# Patient Record
Sex: FEMALE | Race: WHITE
[De-identification: names, ages, dates, MRNs, and addresses within clinical notes are randomized per-mention and may not be internally consistent; named-entity substitution may affect disease eponyms.]

---

## 2020-09-18 ENCOUNTER — HOSPITAL ENCOUNTER (OUTPATIENT)
Dept: HOSPITAL 47 - RADMAMWWP | Age: 75
Discharge: HOME | End: 2020-09-18
Attending: FAMILY MEDICINE
Payer: MEDICARE

## 2020-09-18 DIAGNOSIS — R06.2: ICD-10-CM

## 2020-09-18 DIAGNOSIS — Z12.31: Primary | ICD-10-CM

## 2020-09-18 PROCEDURE — 77063 BREAST TOMOSYNTHESIS BI: CPT

## 2020-09-18 PROCEDURE — 77067 SCR MAMMO BI INCL CAD: CPT

## 2020-09-18 PROCEDURE — 71046 X-RAY EXAM CHEST 2 VIEWS: CPT

## 2020-09-18 NOTE — XR
EXAMINATION TYPE: XR chest 2V

 

DATE OF EXAM: 9/18/2020

 

COMPARISON: None

 

INDICATION: Wheezing

 

TECHNIQUE:  Frontal and lateral views of the chest are obtained.

 

FINDINGS:  

The heart size is normal.  

The pulmonary vasculature is normal.

The lungs are clear.  There is elevation of the right diaphragm

 

IMPRESSION:  

1. No acute pulmonary process.

## 2020-10-07 ENCOUNTER — HOSPITAL ENCOUNTER (OUTPATIENT)
Dept: HOSPITAL 47 - CPPFTMAIN | Age: 75
Discharge: HOME | End: 2020-10-07
Attending: FAMILY MEDICINE
Payer: MEDICARE

## 2020-10-07 DIAGNOSIS — N18.30: ICD-10-CM

## 2020-10-07 DIAGNOSIS — R06.2: Primary | ICD-10-CM

## 2020-10-07 DIAGNOSIS — E78.5: ICD-10-CM

## 2020-10-07 DIAGNOSIS — E03.9: ICD-10-CM

## 2020-10-07 LAB
BASOPHILS # BLD AUTO: 0 K/UL (ref 0–0.2)
BASOPHILS NFR BLD AUTO: 1 %
CHOLEST SERPL-MCNC: 174 MG/DL (ref ?–200)
EOSINOPHIL # BLD AUTO: 0.2 K/UL (ref 0–0.7)
EOSINOPHIL NFR BLD AUTO: 4 %
ERYTHROCYTE [DISTWIDTH] IN BLOOD BY AUTOMATED COUNT: 4.26 M/UL (ref 3.8–5.4)
ERYTHROCYTE [DISTWIDTH] IN BLOOD: 14.6 % (ref 11.5–15.5)
HCT VFR BLD AUTO: 35.7 % (ref 34–46)
HDLC SERPL-MCNC: 21 MG/DL (ref 40–60)
HGB BLD-MCNC: 11.8 GM/DL (ref 11.4–16)
LYMPHOCYTES # SPEC AUTO: 1.7 K/UL (ref 1–4.8)
LYMPHOCYTES NFR SPEC AUTO: 33 %
MCH RBC QN AUTO: 27.6 PG (ref 25–35)
MCHC RBC AUTO-ENTMCNC: 32.9 G/DL (ref 31–37)
MCV RBC AUTO: 83.9 FL (ref 80–100)
MONOCYTES # BLD AUTO: 0.5 K/UL (ref 0–1)
MONOCYTES NFR BLD AUTO: 9 %
NEUTROPHILS # BLD AUTO: 2.6 K/UL (ref 1.3–7.7)
NEUTROPHILS NFR BLD AUTO: 51 %
PLATELET # BLD AUTO: 294 K/UL (ref 150–450)
T4 FREE SERPL-MCNC: 1.33 NG/DL (ref 0.78–2.19)
TRIGL SERPL-MCNC: 630 MG/DL (ref ?–150)
WBC # BLD AUTO: 5.1 K/UL (ref 3.8–10.6)

## 2020-10-07 PROCEDURE — 85025 COMPLETE CBC W/AUTO DIFF WBC: CPT

## 2020-10-07 PROCEDURE — 80061 LIPID PANEL: CPT

## 2020-10-07 PROCEDURE — 84443 ASSAY THYROID STIM HORMONE: CPT

## 2020-10-07 PROCEDURE — 94060 EVALUATION OF WHEEZING: CPT

## 2020-10-07 PROCEDURE — 36415 COLL VENOUS BLD VENIPUNCTURE: CPT

## 2020-10-07 PROCEDURE — 84439 ASSAY OF FREE THYROXINE: CPT

## 2020-10-07 PROCEDURE — 94729 DIFFUSING CAPACITY: CPT

## 2020-10-07 PROCEDURE — 94726 PLETHYSMOGRAPHY LUNG VOLUMES: CPT

## 2020-10-07 NOTE — MM
Reason for exam: screening  (asymptomatic).

Last mammogram was performed 1 year and 1 month ago.



Physical Findings:

A clinical breast exam by your physician is recommended on an annual basis and 

results should be correlated with mammographic findings.



MG 3D Screening Mammo W/Cad

Bilateral CC and MLO view(s) were taken.

Prior study comparison: August 5, 2019, mammogram, performed at [place].

The breast tissue is heterogeneously dense. This may lower the sensitivity of 

mammography.  Stable benign calcifications. There is no discrete abnormality.  No 

significant changes when compared with prior studies.





ASSESSMENT: Benign, BI-RAD 2



RECOMMENDATION:

Routine screening mammogram of both breasts in 1 year.

## 2020-11-11 ENCOUNTER — HOSPITAL ENCOUNTER (OUTPATIENT)
Dept: HOSPITAL 47 - EC | Age: 75
Setting detail: OBSERVATION
LOS: 1 days | Discharge: HOME | End: 2020-11-12
Attending: INTERNAL MEDICINE | Admitting: INTERNAL MEDICINE
Payer: MEDICARE

## 2020-11-11 DIAGNOSIS — Z79.4: ICD-10-CM

## 2020-11-11 DIAGNOSIS — Z79.899: ICD-10-CM

## 2020-11-11 DIAGNOSIS — Z90.49: ICD-10-CM

## 2020-11-11 DIAGNOSIS — Z90.710: ICD-10-CM

## 2020-11-11 DIAGNOSIS — R42: Primary | ICD-10-CM

## 2020-11-11 DIAGNOSIS — N18.9: ICD-10-CM

## 2020-11-11 DIAGNOSIS — Z96.29: ICD-10-CM

## 2020-11-11 DIAGNOSIS — E11.22: ICD-10-CM

## 2020-11-11 DIAGNOSIS — R74.8: ICD-10-CM

## 2020-11-11 DIAGNOSIS — I12.9: ICD-10-CM

## 2020-11-11 DIAGNOSIS — Z88.8: ICD-10-CM

## 2020-11-11 DIAGNOSIS — Z94.4: ICD-10-CM

## 2020-11-11 DIAGNOSIS — Z79.02: ICD-10-CM

## 2020-11-11 DIAGNOSIS — Z91.041: ICD-10-CM

## 2020-11-11 DIAGNOSIS — Z86.73: ICD-10-CM

## 2020-11-11 DIAGNOSIS — Z79.890: ICD-10-CM

## 2020-11-11 DIAGNOSIS — R25.2: ICD-10-CM

## 2020-11-11 LAB
ALBUMIN SERPL-MCNC: 4.4 G/DL (ref 3.5–5)
ALP SERPL-CCNC: 46 U/L (ref 38–126)
ALT SERPL-CCNC: 77 U/L (ref 4–34)
ANION GAP SERPL CALC-SCNC: 7 MMOL/L
APTT BLD: 23 SEC (ref 22–30)
AST SERPL-CCNC: 70 U/L (ref 14–36)
BASOPHILS # BLD AUTO: 0 K/UL (ref 0–0.2)
BASOPHILS NFR BLD AUTO: 0 %
BUN SERPL-SCNC: 27 MG/DL (ref 7–17)
CALCIUM SPEC-MCNC: 9.7 MG/DL (ref 8.4–10.2)
CHLORIDE SERPL-SCNC: 102 MMOL/L (ref 98–107)
CO2 SERPL-SCNC: 25 MMOL/L (ref 22–30)
EOSINOPHIL # BLD AUTO: 0.1 K/UL (ref 0–0.7)
EOSINOPHIL NFR BLD AUTO: 3 %
ERYTHROCYTE [DISTWIDTH] IN BLOOD BY AUTOMATED COUNT: 4.13 M/UL (ref 3.8–5.4)
ERYTHROCYTE [DISTWIDTH] IN BLOOD: 14.4 % (ref 11.5–15.5)
GLUCOSE SERPL-MCNC: 169 MG/DL (ref 74–99)
HCT VFR BLD AUTO: 33.9 % (ref 34–46)
HGB BLD-MCNC: 11.6 GM/DL (ref 11.4–16)
INR PPP: 1 (ref ?–1.2)
LYMPHOCYTES # SPEC AUTO: 1.3 K/UL (ref 1–4.8)
LYMPHOCYTES NFR SPEC AUTO: 28 %
MCH RBC QN AUTO: 28 PG (ref 25–35)
MCHC RBC AUTO-ENTMCNC: 34.2 G/DL (ref 31–37)
MCV RBC AUTO: 82 FL (ref 80–100)
MONOCYTES # BLD AUTO: 0.4 K/UL (ref 0–1)
MONOCYTES NFR BLD AUTO: 8 %
NEUTROPHILS # BLD AUTO: 2.7 K/UL (ref 1.3–7.7)
NEUTROPHILS NFR BLD AUTO: 59 %
PLATELET # BLD AUTO: 288 K/UL (ref 150–450)
POTASSIUM SERPL-SCNC: 5 MMOL/L (ref 3.5–5.1)
PROT SERPL-MCNC: 7.7 G/DL (ref 6.3–8.2)
PT BLD: 10.6 SEC (ref 9–12)
SODIUM SERPL-SCNC: 134 MMOL/L (ref 137–145)
WBC # BLD AUTO: 4.6 K/UL (ref 3.8–10.6)

## 2020-11-11 PROCEDURE — 96374 THER/PROPH/DIAG INJ IV PUSH: CPT

## 2020-11-11 PROCEDURE — 84484 ASSAY OF TROPONIN QUANT: CPT

## 2020-11-11 PROCEDURE — 85025 COMPLETE CBC W/AUTO DIFF WBC: CPT

## 2020-11-11 PROCEDURE — 36415 COLL VENOUS BLD VENIPUNCTURE: CPT

## 2020-11-11 PROCEDURE — 99285 EMERGENCY DEPT VISIT HI MDM: CPT

## 2020-11-11 PROCEDURE — 85610 PROTHROMBIN TIME: CPT

## 2020-11-11 PROCEDURE — 80053 COMPREHEN METABOLIC PANEL: CPT

## 2020-11-11 PROCEDURE — 70450 CT HEAD/BRAIN W/O DYE: CPT

## 2020-11-11 PROCEDURE — 93005 ELECTROCARDIOGRAM TRACING: CPT

## 2020-11-11 PROCEDURE — 85730 THROMBOPLASTIN TIME PARTIAL: CPT

## 2020-11-11 PROCEDURE — 71046 X-RAY EXAM CHEST 2 VIEWS: CPT

## 2020-11-11 RX ADMIN — CEFAZOLIN SCH: 330 INJECTION, POWDER, FOR SOLUTION INTRAMUSCULAR; INTRAVENOUS at 19:19

## 2020-11-11 RX ADMIN — MYCOPHENOLIC ACID SCH MG: 180 TABLET, DELAYED RELEASE ORAL at 20:17

## 2020-11-11 RX ADMIN — METOPROLOL TARTRATE SCH MG: 50 TABLET, FILM COATED ORAL at 20:17

## 2020-11-11 NOTE — P.CNNES
History of Present Illness


Consult date: 11/11/20


Requesting physician: Ralph Vigil


Reason for Consult: Vertigo, ataxia, evaluate for TIA


History of Present Illness: 





Patient is a 75-year-old female came to the hospital today at 10 AM for 

dizziness.  Patient is recently started on prevagen on Sunday, 3 days ago for 

memory.  Patient took the dose on Sunday and Monday, and yesterday on Tuesday 

she woke up and was sweaty.  She felt dizzy like vertigo.  The symptoms improved

throughout the day yesterday but when she woke up this morning, was still 

present.  She describes as a spinning sensation.  Symptoms worsened with upright

position and head movement.  She also felt nauseous, and kept on walking to the 

left and was unsteady on the feet.  Patient is also concerned that she is 

falling towards the left side.  No focal weakness, no speech difficulties or 

visual disturbance.  Patient denies any recent upper respiratory infection, cold

or head injury.





Vital signs on arrival was blood pressure 143/65, pulse rate 67, temperature 

98.2.  CT head showed age-related atrophic and chronic small vessel ischemic 

changes without acute intracranial process.  On my review, the paranasal sinuses

and external auditory canal are clear.  Chest x-ray showed chronic changes wi

thout acute cardiopulmonary process.  EKG shows normal sinus rhythm, nonspecific

ST and T-wave abnormality.





Patient states that for last 1 year she was having a pressure sensation behind 

her left eardrum.  She would feel crackles all the time.  She went to see ENT 

specialist Dr. Cunningham, who performed hearing test and it was a "flatline" on 

the left, whereas she states was normal on the right.  Tympanostomy tube was 

placed in his office 2 weeks ago.  Patient denies any tinnitus.  Denies any h

earing loss.  Patient denies any use of tobacco, no hypertension.  





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 

head on the corner of the table.  When EMTs was called, she was confused and not

able to answer simple questions.  Patient does take Plavix 75 mg daily.  Patient

also has history of liver transplant due to Colon on 10/23/2011.  She has 

diabetes for last 24 years.





Review of Systems





As above in detail.  All other review of systems unremarkable.  Patient denies 

headache, problem with the vision, hoarseness, sore throat or dysphagia.  He 

denies any chest pain shortness of breath, wheezing or cough.  Denies abdominal 

pain nausea vomiting and diarrhea.  Denies neck or back pain.  All other review 

of systems unremarkable except as above.





Past Medical History


Past Medical History: CVA/TIA, Hypertension


History of Any Multi-Drug Resistant Organisms: None Reported


Past Surgical History: Appendectomy, Cholecystectomy, Hysterectomy


Additional Past Surgical History / Comment(s): D&C, liver transplant


Past Psychological History: No Psychological Hx Reported


Smoking Status: Never smoker


Past Alcohol Use History: None Reported


Past Drug Use History: None Reported





Medications and Allergies


                                Home Medications











 Medication  Instructions  Recorded  Confirmed  Type


 


Acetaminophen Tab [Tylenol Tab] 1,000 mg PO Q6HR PRN 11/11/20 11/11/20 History


 


Atorvastatin [Lipitor] 40 mg PO HS 11/11/20 11/11/20 History


 


Calcium Carbonate/Vitamin D3 1 tab PO DAILY 11/11/20 11/11/20 History





[Calcium 500-Vit D3 200 Tablet]    


 


Cephalexin [Keflex] 250 mg PO DAILY 11/11/20 11/11/20 History


 


Cholecalciferol [Vitamin D3] 400 unit PO DAILY 11/11/20 11/11/20 History


 


Cinnamon Bark [Cinnamon] 2,000 mg PO DAILY 11/11/20 11/11/20 History


 


Clopidogrel [Plavix] 75 mg PO DAILY 11/11/20 11/11/20 History


 


Cranberry Fruit Extract [Cranberry] 500 mg PO DAILY 11/11/20 11/11/20 History


 


Fenofibrate 54 mg PO DAILY 11/11/20 11/11/20 History


 


Furosemide [Lasix] 20 mg PO DAILY PRN 11/11/20 11/11/20 History


 


Garlique 1 tab PO DAILY 11/11/20 11/11/20 History


 


Glucosamine/Chondr Coffey A Sod [Osteo 2 tab PO DAILY 11/11/20 11/11/20 History





Bi-Flex Caplet]    


 


Insulin Glargine [Lantus] 40 unit SQ DAILY 11/11/20 11/11/20 History


 


Insulin Regular, Human [NovoLIN R] See Protocol SQ AC-TID 11/11/20 11/11/20 

History


 


Leg Cramps Sl 2 - 3 tab SL DAILY PRN 11/11/20 11/11/20 History


 


Levothyroxine Sodium [Synthroid] 50 mcg PO DAILY 11/11/20 11/11/20 History


 


Lisinopril [Prinivil] 10 mg PO DAILY 11/11/20 11/11/20 History


 


Magnesium Oxide 400 mg PO DAILY 11/11/20 11/11/20 History


 


Rosendo Red 1 tab PO DAILY 11/11/20 11/11/20 History


 


Metoprolol Tartrate [Lopressor] 50 mg PO BID 11/11/20 11/11/20 History


 


Multivitamins, Thera [Multivitamin 1 tab PO DAILY 11/11/20 11/11/20 History





(formulary)]    


 


Mycophenolate Sodium [Mycophenolic 360 mg PO BID 11/11/20 11/11/20 History





Acid]    


 


Omega-3 Fatty Acids [Omega-3] 1,000 mg PO DAILY 11/11/20 11/11/20 History


 


Pantoprazole Sodium [Protonix] 40 mg PO DAILY 11/11/20 11/11/20 History


 


Vit C/E/Zn/Coppr/Lutein/Zeaxan 1 cap PO DAILY 11/11/20 11/11/20 History





[Preservision Areds 2 Softgel]    


 


Vitamin E 1,000 unit PO DAILY 11/11/20 11/11/20 History


 


Zinc Gluconate [Zinc] 50 mg PO DAILY 11/11/20 11/11/20 History


 


cycloSPORINE [cycloSPORINE (GEQ 25 mg PO DAILY 11/11/20 11/11/20 History





for SandIMMUNE)]    


 


cycloSPORINE [cycloSPORINE (GEQ 50 mg PO HS 11/11/20 11/11/20 History





for SandIMMUNE)]    


 


sitaGLIPtin [Januvia] 100 mg PO DAILY 11/11/20 11/11/20 History








                                    Allergies











Allergy/AdvReac Type Severity Reaction Status Date / Time


 


Iodinated Contrast Media Allergy  Rash/Hives Verified 11/11/20 11:30


 


pentazocine [From Talwin] Allergy  Vomiting Verified 11/11/20 11:47














Physical Examination





- Vital Signs


Vital Signs: 


                                   Vital Signs











  Temp Pulse Resp BP Pulse Ox


 


 11/11/20 12:54   61  16  120/65  98


 


 11/11/20 10:03  98.2 F  67  18  143/65  99








                                Intake and Output











 11/10/20 11/11/20 11/11/20





 22:59 06:59 14:59


 


Other:   


 


  Weight   65.771 kg














On examination patient is an elderly  female, in no acute distress.  

Patient is alert and awake fully oriented.  Speech and language functions are 

normal.  Attention and concentration fund of knowledge is adequate.  On cranial 

examination pupils are round and reactive to light, visual fields are full on 

confrontation, extraocular muscles are intact with no nystagmus.  Face is 

symmetric, tongue protrudes to the midline.  Palatal elevation and sensation 

normal hearing appears normal for conversation.  Shoulder shrug normal, facial 

sensations normal.  Otologic examination revealed somewhat retracted left 

eardrum.  Evidence of recent placement of tympanostomy tube on the left side.  

The right eardrum appears normal.  On muscle strength testing there is no 

pronator drift and the strength is normal in arms and legs distally and 

proximally.  Reflexes are 1+ to 2, and plantars downgoing.  Sensory touch is 

equal.  No ataxia for finger-to-nose or heel-to-shin testing.  Tone and bulk of 

muscles normal.  Gait appears normal.  No carotid bruit or murmur, peripheral 

pulses present.





Results





- Laboratory Findings


CBC and BMP: 


                                 11/11/20 10:40





                                 11/11/20 10:40


Abnormal Lab Findings: 


                                  Abnormal Labs











  11/11/20 11/11/20





  10:40 10:40


 


Hct  33.9 L 


 


Sodium   134 L


 


BUN   27 H


 


Creatinine   1.25 H


 


Glucose   169 H


 


AST   70 H


 


ALT   77 H














Assessment and Plan


Assessment: 





* New onset vertigo since yesterday morning, likely due to peripheral vestibular

  dysfunction.  Patient had a one-year history of pressure and fluid behind the 

  left eardrum, for which she underwent placement of tympanostomy tube 2 weeks 

  ago.


* History of liver transplant for Colon.


* Diabetes


Plan: 





* Suggest patient follow-up with ENT specialist Dr. Ferguson regarding this 

  episode of persistent vertigo.


* Antivert as needed.


* Suggest Medrol Dosepak if no medical contra indications.


* Neurologically clear.

## 2020-11-11 NOTE — XR
EXAMINATION TYPE: XR chest 2V

 

DATE OF EXAM: 11/11/2020

 

COMPARISON: 9/18/2020

 

HISTORY: 75 year-old female altered mental status, vertigo and syncope.

 

TECHNIQUE:  PA and lateral views

 

FINDINGS:  

Heart normal size. Aorta and pulmonary vasculature within normal limits. Asymmetric elevation right h
emidiaphragm. No consolidation or pleural effusion. Mild interstitial prominence is unchanged.

 

 

IMPRESSION:  

Chronic changes without acute cardiopulmonary process.

## 2020-11-11 NOTE — ED
General Adult HPI





- General


Chief complaint: Neuro Symptoms/Deficit


Stated complaint: neuro issues


Time Seen by Provider: 11/11/20 10:04


Source: patient, family, RN notes reviewed


Mode of arrival: wheelchair


Limitations: no limitations





- History of Present Illness


Initial comments: 





Patient is a pleasant 75-year-old female presenting to the emergency department 

with dizziness.  Patient is on prevagen recently for memory.  Patient started 

that on Sunday.  Patient woke up Tuesday morning and was sweaty.  Patient also 

felt dizzy like vertigo.  Patient states symptoms improved throughout the day 

yesterday and then are still present this morning.  Patient has dizziness 

described as a spinning sensation.  Symptoms worsen with upright position and 

head movement.  Patient does have history of similar symptoms years ago 

associated with stroke.  Patient is also concerned that she is falling towards 

the left side.  No extremity weakness.  No speech problems.  No confusion.





- Related Data


                                    Allergies











Allergy/AdvReac Type Severity Reaction Status Date / Time


 


Iodinated Contrast Media Allergy  Rash/Hives Verified 11/11/20 10:11














Review of Systems


ROS Statement: 


Those systems with pertinent positive or pertinent negative responses have been 

documented in the HPI.





ROS Other: All systems not noted in ROS Statement are negative.


Constitutional: Denies: fever


Eyes: Denies: eye pain


ENT: Denies: ear pain


Respiratory: Denies: cough


Cardiovascular: Denies: chest pain


Endocrine: Denies: fatigue


Gastrointestinal: Denies: abdominal pain


Genitourinary: Denies: dysuria


Musculoskeletal: Denies: back pain


Skin: Denies: rash


Neurological: Reports: as per HPI, vertigo.  Denies: headache, weakness, 

confusion





Past Medical History


Past Medical History: CVA/TIA, Hypertension


History of Any Multi-Drug Resistant Organisms: None Reported


Past Surgical History: Appendectomy, Cholecystectomy, Hysterectomy


Additional Past Surgical History / Comment(s): D&C, liver transplant


Past Psychological History: No Psychological Hx Reported


Smoking Status: Never smoker


Past Alcohol Use History: None Reported


Past Drug Use History: None Reported





General Exam


Limitations: no limitations


General appearance: alert, in no apparent distress


Head exam: Present: normocephalic


Eye exam: Present: normal appearance, PERRL, EOMI.  Absent: nystagmus


ENT exam: Present: normal oropharynx


Neck exam: Present: normal inspection


Respiratory exam: Present: normal lung sounds bilaterally


Cardiovascular Exam: Present: regular rate, normal rhythm


GI/Abdominal exam: Present: soft.  Absent: tenderness


Extremities exam: Present: normal inspection


Neurological exam: Present: alert, oriented X3, CN II-XII intact.  Absent: motor

sensory deficit


  ** Expanded


Neurological exam: Present: protecting the airway


Patient oriented to: Present: person, place, time


Speech: Present: fluid speech


Cranial nerves: EOM's Intact: Normal, Facial Sensation: Normal


Cerebellar function: Finger to Nose: Normal


Sensory exam: Upper Extremity Light Touch: Normal, Lower Extremity Light Touch: 

Normal


Motor strength exam: RUE: 5, LUE: 5, RLE: 5, LLE: 5


Eye Response: (4) open spontaneously


Motor Response: (6) obeys commands


Verbal Response: (5) oriented


Psychiatric exam: Present: normal affect, normal mood


Skin exam: Present: normal color





Course


                                   Vital Signs











  11/11/20





  10:03


 


Temperature 98.2 F


 


Pulse Rate 67


 


Respiratory 18





Rate 


 


Blood Pressure 143/65


 


O2 Sat by Pulse 99





Oximetry 














- Reevaluation(s)


Reevaluation #1: 





11/11/20 10:27


Patient states she cannot have contrast.  Patient states when she received last 

time her creatinine went to 11 and she needed dialysis.  Patient states this is 

not an option.





EKG Findings





- EKG Comments:


EKG Findings:: Normal sinus rhythm 68.  .  QRS 92.  .  .  

Left axis.  Normal QRS.  Nonspecific ST-T.





Medical Decision Making





- Medical Decision Making





Secondary to patient with history of similar symptoms associated with stroke and

leaning towards left side I would like patient to be admitted with neurology 

evaluation.  Case was discussed with Dr. parks, covering for Dr. Sanchez who is in

agreement and will admit.  Neurology will be placed on consult.  She was 

reevaluated and updated.  Patient states she is feeling somewhat better with 

medications.





- Lab Data


Result diagrams: 


                                 11/11/20 10:40





                                 11/11/20 10:40


                                   Lab Results











  11/11/20 11/11/20 Range/Units





  10:40 10:40 


 


WBC  4.6   (3.8-10.6)  k/uL


 


RBC  4.13   (3.80-5.40)  m/uL


 


Hgb  11.6   (11.4-16.0)  gm/dL


 


Hct  33.9 L   (34.0-46.0)  %


 


MCV  82.0   (80.0-100.0)  fL


 


MCH  28.0   (25.0-35.0)  pg


 


MCHC  34.2   (31.0-37.0)  g/dL


 


RDW  14.4   (11.5-15.5)  %


 


Plt Count  288   (150-450)  k/uL


 


MPV  6.9   


 


Neutrophils %  59   %


 


Lymphocytes %  28   %


 


Monocytes %  8   %


 


Eosinophils %  3   %


 


Basophils %  0   %


 


Neutrophils #  2.7   (1.3-7.7)  k/uL


 


Lymphocytes #  1.3   (1.0-4.8)  k/uL


 


Monocytes #  0.4   (0-1.0)  k/uL


 


Eosinophils #  0.1   (0-0.7)  k/uL


 


Basophils #  0.0   (0-0.2)  k/uL


 


Sodium   134 L  (137-145)  mmol/L


 


Potassium   5.0  (3.5-5.1)  mmol/L


 


Chloride   102  ()  mmol/L


 


Carbon Dioxide   25  (22-30)  mmol/L


 


Anion Gap   7  mmol/L


 


BUN   27 H  (7-17)  mg/dL


 


Creatinine   1.25 H  (0.52-1.04)  mg/dL


 


Est GFR (CKD-EPI)AfAm   49  (>60 ml/min/1.73 sqM)  


 


Est GFR (CKD-EPI)NonAf   43  (>60 ml/min/1.73 sqM)  


 


Glucose   169 H  (74-99)  mg/dL


 


Calcium   9.7  (8.4-10.2)  mg/dL


 


Total Bilirubin   0.6  (0.2-1.3)  mg/dL


 


AST   70 H  (14-36)  U/L


 


ALT   77 H  (4-34)  U/L


 


Alkaline Phosphatase   46  ()  U/L


 


Total Protein   7.7  (6.3-8.2)  g/dL


 


Albumin   4.4  (3.5-5.0)  g/dL














- Radiology Data


Radiology results: report reviewed (Computed tomography scan of the brain shows 

atrophy and chronic small vessel changes without acute process.), image reviewed

(Chest x-ray shows chronic changes without acute process)





Disposition


Clinical Impression: 


 Vertigo, Ataxia





Disposition: ADMITTED AS IP TO THIS HOSP


Is patient prescribed a controlled substance at d/c from ED?: No


Referrals: 


Rey Sanchez MD [Primary Care Provider] - 1-2 days


Decision Time: 11:19

## 2020-11-11 NOTE — P.HPIM
History of Present Illness





This is a pleasant 75 years old female with past medical history of CVA/TIA, 

liver transplant.  She is a patient of Dr. Le.  Also she follows up with her

hepatologist at [facility] for her liver transplant.  Also she has 

chronic kidney disease secondary to contrast she received during the liver 

transplant, at that time she needed temporal dialysis when her creatinine went 

up to 11.  Also she developed stroke 2 years after the liver transplant.  This 

time she Presents because of dizziness and vertigo of 2 days' duration, she says

that the room is a spinning around her associated with some nausea yesterday but

no vomiting.  And so numbness.  No recent history of upper respiratory tract 

infection, no chest pain or dyspnea, no change in urine or bowel habits.  No 

fever.


She denies smoking, alcohol or illicit drugs





Vitals are stable.  Labs including CBC and INR were unremarkable.  Creatinine is

slightly elevated at 1.25 and liver enzymes slightly up with AST 70 and ALT 77. 

Troponin is negative with less than 0.012.


EKG showing normal sinus rhythm at 68 with no significant ST-T changes.  QTC 

427.  CT of the brain showing cerebral atrophy with no acute process per 

Radiologist.


Chest x-ray: No acute process




















Review of Systems





CONSTITUTIONAL: No fever, no malaise, no fatigue. 


HEENT: No recent visual problems or hearing problems. Denied any sore throat. 


CARDIOVASCULAR: No  orthopnea, PND, no palpitations, no syncope. 


PULMONARY: No shortness of breath, no cough, no hemoptysis. 


GASTROINTESTINAL: No diarrhea, no nausea, no vomiting, no abdominal pain. 

Normoactive bowel sounds. 


NEUROLOGICAL: No headaches, no weakness, no numbness. 


HEMATOLOGICAL: Denies any bleeding or petechiae. 


GENITOURINARY: Denies any burning micturition, frequency, or urgency. 


MUSCULOSKELETAL/RHEUMATOLOGICAL: Denies any joint pain, swelling, or any muscle 

pain. 


ENDOCRINE: Denies any polyuria or polydipsia.











Past Medical History


Past Medical History: CVA/TIA, Hypertension


History of Any Multi-Drug Resistant Organisms: None Reported


Past Surgical History: Appendectomy, Cholecystectomy, Hysterectomy


Additional Past Surgical History / Comment(s): D&C, liver transplant


Past Psychological History: No Psychological Hx Reported


Smoking Status: Never smoker


Past Alcohol Use History: None Reported


Past Drug Use History: None Reported





Medications and Allergies


                                Home Medications











 Medication  Instructions  Recorded  Confirmed  Type


 


Acetaminophen Tab [Tylenol Tab] 1,000 mg PO Q6HR PRN 11/11/20 11/11/20 History


 


Atorvastatin [Lipitor] 40 mg PO HS 11/11/20 11/11/20 History


 


Calcium Carbonate/Vitamin D3 1 tab PO DAILY 11/11/20 11/11/20 History





[Calcium 500-Vit D3 200 Tablet]    


 


Cephalexin [Keflex] 250 mg PO DAILY 11/11/20 11/11/20 History


 


Cholecalciferol [Vitamin D3] 400 unit PO DAILY 11/11/20 11/11/20 History


 


Cinnamon Bark [Cinnamon] 2,000 mg PO DAILY 11/11/20 11/11/20 History


 


Clopidogrel [Plavix] 75 mg PO DAILY 11/11/20 11/11/20 History


 


Cranberry Fruit Extract [Cranberry] 500 mg PO DAILY 11/11/20 11/11/20 History


 


Fenofibrate 54 mg PO DAILY 11/11/20 11/11/20 History


 


Furosemide [Lasix] 20 mg PO DAILY PRN 11/11/20 11/11/20 History


 


Garlique 1 tab PO DAILY 11/11/20 11/11/20 History


 


Glucosamine/Chondr Coffey A Sod [Osteo 2 tab PO DAILY 11/11/20 11/11/20 History





Bi-Flex Caplet]    


 


Insulin Glargine [Lantus] 40 unit SQ DAILY 11/11/20 11/11/20 History


 


Insulin Regular, Human [NovoLIN R] See Protocol SQ AC-TID 11/11/20 11/11/20 

History


 


Leg Cramps Sl 2 - 3 tab SL DAILY PRN 11/11/20 11/11/20 History


 


Levothyroxine Sodium [Synthroid] 50 mcg PO DAILY 11/11/20 11/11/20 History


 


Lisinopril [Prinivil] 10 mg PO DAILY 11/11/20 11/11/20 History


 


Magnesium Oxide 400 mg PO DAILY 11/11/20 11/11/20 History


 


Rosendo Red 1 tab PO DAILY 11/11/20 11/11/20 History


 


Metoprolol Tartrate [Lopressor] 50 mg PO BID 11/11/20 11/11/20 History


 


Multivitamins, Thera [Multivitamin 1 tab PO DAILY 11/11/20 11/11/20 History





(formulary)]    


 


Mycophenolate Sodium [Mycophenolic 360 mg PO BID 11/11/20 11/11/20 History





Acid]    


 


Omega-3 Fatty Acids [Omega-3] 1,000 mg PO DAILY 11/11/20 11/11/20 History


 


Pantoprazole Sodium [Protonix] 40 mg PO DAILY 11/11/20 11/11/20 History


 


Vit C/E/Zn/Coppr/Lutein/Zeaxan 1 cap PO DAILY 11/11/20 11/11/20 History





[Preservision Areds 2 Softgel]    


 


Vitamin E 1,000 unit PO DAILY 11/11/20 11/11/20 History


 


Zinc Gluconate [Zinc] 50 mg PO DAILY 11/11/20 11/11/20 History


 


cycloSPORINE [cycloSPORINE (GEQ 25 mg PO DAILY 11/11/20 11/11/20 History





for SandIMMUNE)]    


 


cycloSPORINE [cycloSPORINE (GEQ 50 mg PO HS 11/11/20 11/11/20 History





for SandIMMUNE)]    


 


sitaGLIPtin [Januvia] 100 mg PO DAILY 11/11/20 11/11/20 History








                                    Allergies











Allergy/AdvReac Type Severity Reaction Status Date / Time


 


Iodinated Contrast Media Allergy  Rash/Hives Verified 11/11/20 11:30


 


pentazocine [From Talwin] Allergy  Vomiting Verified 11/11/20 11:47














Physical Exam


Vitals: 


                                   Vital Signs











  Temp Pulse Resp BP Pulse Ox


 


 11/11/20 12:54   61  16  120/65  98


 


 11/11/20 10:03  98.2 F  67  18  143/65  99








                                Intake and Output











 11/10/20 11/11/20 11/11/20





 22:59 06:59 14:59


 


Other:   


 


  Weight   65.771 kg

















GENERAL: The patient is alert and oriented x3, not in any acute distress. Well 

developed, well nourished. 


HEENT: Pupils are round and equally reacting to light. EOMI. No scleral icterus.

No conjunctival pallor. Normocephalic, atraumatic. No pharyngeal erythema. No 

thyromegaly. 


CARDIOVASCULAR: S1 and S2 present. No murmurs, rubs, or gallops. 


PULMONARY: Chest is clear to auscultation, no wheezing or crackles. 


ABDOMEN: Soft, nontender, nondistended, normoactive bowel sounds. No palpable 

organomegaly. 


MUSCULOSKELETAL: No joint swelling or deformity. 


EXTREMITIES: No cyanosis, clubbing, or pedal edema. 


NEUROLOGICAL: Gross neurological examination did not reveal any focal deficits. 


SKIN: No rashes. No petechiae











Results


CBC & Chem 7: 


                                 11/11/20 10:40





                                 11/11/20 10:40


Labs: 


                  Abnormal Lab Results - Last 24 Hours (Table)











  11/11/20 11/11/20 Range/Units





  10:40 10:40 


 


Hct  33.9 L   (34.0-46.0)  %


 


Sodium   134 L  (137-145)  mmol/L


 


BUN   27 H  (7-17)  mg/dL


 


Creatinine   1.25 H  (0.52-1.04)  mg/dL


 


Glucose   169 H  (74-99)  mg/dL


 


AST   70 H  (14-36)  U/L


 


ALT   77 H  (4-34)  U/L














Assessment and Plan


Assessment: 





Vertigo, acute


Mildly elevated liver enzymes.


Chronic kidney disease


History of CVA/TIA


History of liver transplant














Plan: 








A pleasant 75 years old female who presents with critical.  Neurology consult.  

Continue with meclizine as needed


Labs and medication were reviewed..  Continue same treatment.  Continue with 

symptomatic treatment.  Resume home medication.  Monitor lytes and vitals.  DVT 

and GI prophylaxis.  Further recommendations depends on the clinical course of 

the patient


DVT prophylaxis: Subcutaneous heparin


GI Prophylaxis: Pepcid


PT/OT: Pending


Prognosis is guarded

## 2020-11-11 NOTE — CT
EXAMINATION TYPE: CT brain wo con for TPA

 

DATE OF EXAM: 11/11/2020

 

COMPARISON: None

 

HISTORY: vertigo, nausea

 

CT DLP: 1100.4 mGycm

 

Unenhanced CT of the brain was performed.  

 

The ventricles, basal cisterns and sulci overlying the cerebral convexities demonstrate mild enlargem
ent. 

 

There is no evidence for intracranial hemorrhage or sulcal effacement.  

 

There is decreased attenuation about the periventricular white matter and deep white matter of both c
erebral hemispheres, compatible with chronic small vessel ischemia. Differential diagnosis does inclu
de demyelination. 

 

No mass effects are seen.No midline shift.

 

Osseous calvarium is intact.  

 

If symptoms persist consider MRI.  

 

IMPRESSION:

 

1. Age related atrophic and chronic small vessel ischemic change without acute intracranial process s
een at this time.

## 2020-11-12 VITALS
SYSTOLIC BLOOD PRESSURE: 146 MMHG | TEMPERATURE: 98.5 F | HEART RATE: 61 BPM | RESPIRATION RATE: 16 BRPM | DIASTOLIC BLOOD PRESSURE: 73 MMHG

## 2020-11-12 LAB — GLUCOSE BLD-MCNC: 133 MG/DL (ref 75–99)

## 2020-11-12 RX ADMIN — MYCOPHENOLIC ACID SCH MG: 180 TABLET, DELAYED RELEASE ORAL at 09:24

## 2020-11-12 RX ADMIN — METOPROLOL TARTRATE SCH MG: 50 TABLET, FILM COATED ORAL at 09:24

## 2020-11-12 RX ADMIN — CEFAZOLIN SCH: 330 INJECTION, POWDER, FOR SOLUTION INTRAMUSCULAR; INTRAVENOUS at 00:47

## 2020-11-12 NOTE — P.DS
Providers


Date of admission: 


11/11/20 11:22





Attending physician: 


Orlin Pacheco MD





Consults: 





                                        





11/11/20 11:22


Consult Physician Urgent 


   Consulting Provider: Vickie Brian


   Consult Reason/Comments: Vertigo and ataxia, evaluate for TIA


   Do you want consulting provider notified?: Yes











Primary care physician: 


Rey Sanchez MD





Hospital Course: 





Acute vertigo, mostly related to her ear problem


increased pressure and fluid behind the left eardrum, she is status post 

placement of tympanostomy tube 2 weeks ago.  By Dr. Ferguson.  Follow-up as an 

outpatient


Mildly elevated liver enzymes.  Follow-up as an outpatient


Chronic kidney disease


History of CVA/TIA


History of liver transplant





Hospital course:


This is a pleasant 75 years old female with past medical history of CVA/TIA, 

liver transplant.  She is a patient of Dr. Le.  Also she follows up with her

hepatologist at [facility] for her liver transplant.  Also she has 

chronic kidney disease secondary to contrast she received during the liver tra

nsplant, at that time she needed temporal dialysis when her creatinine went up 

to 11.  Also she developed stroke 2 years after the liver transplant.  This time

she Presents because of dizziness and vertigo of 2 days' duration, she says that

the room is a spinning around her associated with some nausea yesterday but no 

vomiting.


Also patient is a status post tympanostomy about 2 weeks ago by Dr. Go 

secondary to fluid collection behind her left ear, because of this neurologist 

evaluated the patient and cleared her for discharge and follow-up with Dr. Go in 1-2 weeks and patient agrees.  On the day of discharge patient 

dizziness and vertigo are completely resolved and patient is asymptomatic and 

she is back to normal states and she wants to go home today


Patient denies any other symptoms, no chest pain or dyspnea, no coughing, no 

change in urine or bowel habits.  No fever.


Patient was cleared for discharge by neurologist


Problems and management plan were discussed with the patient and he verbalized 

understanding and acceptance


Patient was found stable and can be discharged home however he needs follow-up 

as an outpatient. Patient was instructed to follow up with PCP Dr. melendez 

for within one week and patient agrees.  Also patient was instructed to follow 

up with her ENT doctor Abbi in one week and she agrees to call and make 

appointment further than to wait 6 months as previously recommended, she agrees 

to call in one week





Gen: patient is a AAOx3, no distress


CVS: S1-S2, RRR, no murmur


Lungs: B/L CTA, no wheezing


Abdomen: soft, no distention, no tenderness, positive bowel sounds


Extremity: no leg edema or induration





Time spent more than 35 minutes











Plan - Discharge Summary


Discharge Rx Participant: No


New Discharge Prescriptions: 


No Action


   Omega-3 Fatty Acids [Omega-3] 1,000 mg PO DAILY


   Cinnamon Bark [Cinnamon] 2,000 mg PO DAILY


   Zinc Gluconate [Zinc] 50 mg PO DAILY


   Leg Cramps Sl 2 - 3 tab SL DAILY PRN


     PRN Reason: LEG CRAMPS


   Glucosamine/Chondr Coffey A Sod [Osteo Bi-Flex Caplet] 2 tab PO DAILY


   Cranberry Fruit Extract [Cranberry] 500 mg PO DAILY


   Multivitamins, Thera [Multivitamin (formulary)] 1 tab PO DAILY


   Magnesium Oxide 400 mg PO DAILY


   Cholecalciferol [Vitamin D3] 400 unit PO DAILY


   Vitamin E 1,000 unit PO DAILY


   Vit C/E/Zn/Coppr/Lutein/Zeaxan [Preservision Areds 2 Softgel] 1 cap PO DAILY


   Rosendo Red 1 tab PO DAILY


   Garlique 1 tab PO DAILY


   Metoprolol Tartrate [Lopressor] 50 mg PO BID


   Lisinopril [Prinivil] 10 mg PO DAILY


   Acetaminophen Tab [Tylenol Tab] 1,000 mg PO Q6HR PRN


     PRN Reason: Pain


   Pantoprazole Sodium [Protonix] 40 mg PO DAILY


   Levothyroxine Sodium [Synthroid] 50 mcg PO DAILY


   Clopidogrel [Plavix] 75 mg PO DAILY


   sitaGLIPtin [Januvia] 100 mg PO DAILY


   Insulin Regular, Human [NovoLIN R] See Protocol SQ AC-TID


   Insulin Glargine [Lantus] 40 unit SQ DAILY


   Furosemide [Lasix] 20 mg PO DAILY PRN


     PRN Reason: Edema


   Cephalexin [Keflex] 250 mg PO DAILY


   Atorvastatin [Lipitor] 40 mg PO HS


   Fenofibrate 54 mg PO DAILY


   Mycophenolate Sodium [Mycophenolic Acid] 360 mg PO BID


   cycloSPORINE [cycloSPORINE (GEQ for SandIMMUNE)] 50 mg PO HS


   cycloSPORINE [cycloSPORINE (GEQ for SandIMMUNE)] 25 mg PO DAILY


   Calcium Carbonate/Vitamin D3 [Calcium 500-Vit D3 200 Tablet] 1 tab PO DAILY


Discharge Medication List





Acetaminophen Tab [Tylenol Tab] 1,000 mg PO Q6HR PRN 11/11/20 [History]


Atorvastatin [Lipitor] 40 mg PO HS 11/11/20 [History]


Calcium Carbonate/Vitamin D3 [Calcium 500-Vit D3 200 Tablet] 1 tab PO DAILY 

11/11/20 [History]


Cephalexin [Keflex] 250 mg PO DAILY 11/11/20 [History]


Cholecalciferol [Vitamin D3] 400 unit PO DAILY 11/11/20 [History]


Cinnamon Bark [Cinnamon] 2,000 mg PO DAILY 11/11/20 [History]


Clopidogrel [Plavix] 75 mg PO DAILY 11/11/20 [History]


Cranberry Fruit Extract [Cranberry] 500 mg PO DAILY 11/11/20 [History]


Fenofibrate 54 mg PO DAILY 11/11/20 [History]


Furosemide [Lasix] 20 mg PO DAILY PRN 11/11/20 [History]


Garlique 1 tab PO DAILY 11/11/20 [History]


Glucosamine/Chondr Coffey A Sod [Osteo Bi-Flex Caplet] 2 tab PO DAILY 11/11/20 

[History]


Insulin Glargine [Lantus] 40 unit SQ DAILY 11/11/20 [History]


Insulin Regular, Human [NovoLIN R] See Protocol SQ AC-TID 11/11/20 [History]


Leg Cramps Sl 2 - 3 tab SL DAILY PRN 11/11/20 [History]


Levothyroxine Sodium [Synthroid] 50 mcg PO DAILY 11/11/20 [History]


Lisinopril [Prinivil] 10 mg PO DAILY 11/11/20 [History]


Magnesium Oxide 400 mg PO DAILY 11/11/20 [History]


Rosendo Red 1 tab PO DAILY 11/11/20 [History]


Metoprolol Tartrate [Lopressor] 50 mg PO BID 11/11/20 [History]


Multivitamins, Thera [Multivitamin (formulary)] 1 tab PO DAILY 11/11/20 

[History]


Mycophenolate Sodium [Mycophenolic Acid] 360 mg PO BID 11/11/20 [History]


Omega-3 Fatty Acids [Omega-3] 1,000 mg PO DAILY 11/11/20 [History]


Pantoprazole Sodium [Protonix] 40 mg PO DAILY 11/11/20 [History]


Vit C/E/Zn/Coppr/Lutein/Zeaxan [Preservision Areds 2 Softgel] 1 cap PO DAILY 

11/11/20 [History]


Vitamin E 1,000 unit PO DAILY 11/11/20 [History]


Zinc Gluconate [Zinc] 50 mg PO DAILY 11/11/20 [History]


cycloSPORINE [cycloSPORINE (GEQ for SandIMMUNE)] 25 mg PO DAILY 11/11/20 

[History]


cycloSPORINE [cycloSPORINE (GEQ for SandIMMUNE)] 50 mg PO HS 11/11/20 [History]


sitaGLIPtin [Januvia] 100 mg PO DAILY 11/11/20 [History]








Follow up Appointment(s)/Referral(s): 


Rey Sanchez MD [Primary Care Provider] - 1-2 days

## 2021-01-27 ENCOUNTER — HOSPITAL ENCOUNTER (OUTPATIENT)
Dept: HOSPITAL 47 - LABWHC1 | Age: 76
Discharge: HOME | End: 2021-01-27
Attending: INTERNAL MEDICINE
Payer: MEDICARE

## 2021-01-27 DIAGNOSIS — N18.30: Primary | ICD-10-CM

## 2021-01-27 LAB
BASOPHILS # BLD AUTO: 0.1 K/UL (ref 0–0.2)
BASOPHILS NFR BLD AUTO: 1 %
EOSINOPHIL # BLD AUTO: 0.1 K/UL (ref 0–0.7)
EOSINOPHIL NFR BLD AUTO: 2 %
ERYTHROCYTE [DISTWIDTH] IN BLOOD BY AUTOMATED COUNT: 4.29 M/UL (ref 3.8–5.4)
ERYTHROCYTE [DISTWIDTH] IN BLOOD: 14.4 % (ref 11.5–15.5)
HCT VFR BLD AUTO: 34.8 % (ref 34–46)
HGB BLD-MCNC: 11.7 GM/DL (ref 11.4–16)
LYMPHOCYTES # SPEC AUTO: 1.5 K/UL (ref 1–4.8)
LYMPHOCYTES NFR SPEC AUTO: 29 %
MCH RBC QN AUTO: 27.2 PG (ref 25–35)
MCHC RBC AUTO-ENTMCNC: 33.6 G/DL (ref 31–37)
MCV RBC AUTO: 81 FL (ref 80–100)
MONOCYTES # BLD AUTO: 0.4 K/UL (ref 0–1)
MONOCYTES NFR BLD AUTO: 7 %
NEUTROPHILS # BLD AUTO: 2.9 K/UL (ref 1.3–7.7)
NEUTROPHILS NFR BLD AUTO: 58 %
PLATELET # BLD AUTO: 257 K/UL (ref 150–450)
PROT/CREAT UR-RTO: 0.22
WBC # BLD AUTO: 5.1 K/UL (ref 3.8–10.6)

## 2021-01-27 PROCEDURE — 82570 ASSAY OF URINE CREATININE: CPT

## 2021-01-27 PROCEDURE — 84156 ASSAY OF PROTEIN URINE: CPT

## 2021-01-27 PROCEDURE — 80069 RENAL FUNCTION PANEL: CPT

## 2021-01-27 PROCEDURE — 82306 VITAMIN D 25 HYDROXY: CPT

## 2021-01-27 PROCEDURE — 36415 COLL VENOUS BLD VENIPUNCTURE: CPT

## 2021-01-27 PROCEDURE — 83970 ASSAY OF PARATHORMONE: CPT

## 2021-01-27 PROCEDURE — 85025 COMPLETE CBC W/AUTO DIFF WBC: CPT

## 2021-01-28 LAB
ALBUMIN SERPL-MCNC: 4.8 G/DL (ref 3.8–4.9)
ANION GAP SERPL CALC-SCNC: 10.3 MMOL/L (ref 4–12)
BUN SERPL-SCNC: 18 MG/DL (ref 9–27)
BUN/CREAT SERPL: 12 RATIO (ref 12–20)
CALCIUM SPEC-MCNC: 9.5 MG/DL (ref 8.7–10.3)
CHLORIDE SERPL-SCNC: 98 MMOL/L (ref 96–109)
CO2 SERPL-SCNC: 25.7 MMOL/L (ref 21.6–31.8)
GLUCOSE SERPL-MCNC: 176 MG/DL (ref 70–110)
POTASSIUM SERPL-SCNC: 4.7 MMOL/L (ref 3.5–5.5)
SODIUM SERPL-SCNC: 134 MMOL/L (ref 135–145)

## 2021-03-19 ENCOUNTER — HOSPITAL ENCOUNTER (OUTPATIENT)
Dept: HOSPITAL 47 - RADECHMAIN | Age: 76
End: 2021-03-19
Attending: FAMILY MEDICINE
Payer: MEDICARE

## 2021-03-19 DIAGNOSIS — I08.3: Primary | ICD-10-CM

## 2021-03-19 DIAGNOSIS — I35.0: ICD-10-CM

## 2021-03-19 DIAGNOSIS — I70.202: ICD-10-CM

## 2021-03-19 PROCEDURE — 93306 TTE W/DOPPLER COMPLETE: CPT

## 2021-03-19 NOTE — ECHOF
Referral Reason:R01.1 cardiac murmur



MEASUREMENTS

--------

HEIGHT: 152.4 cm

WEIGHT: 65.8 kg

BP: 

RVIDd:   3.0 cm     (< 3.3)

IVSd:   1.1 cm     (0.6 - 1.1)

LVIDd:   4.0 cm     (3.9 - 5.3)

LVPWd:   1.2 cm     (0.6 - 1.1)

IVSs:   1.3 cm

LVIDs:   3.0 cm

LVPWs:   1.4 cm

LA Diam:   3.3 cm     (2.7 - 3.8)

LAESV Index (A-L):   25.07 ml/m

Ao Diam:   3.2 cm     (2.0 - 3.7)

AV Cusp:   1.4 cm     (1.5 - 2.6)

LA Diam:   3.6 cm     (2.7 - 3.8)

MV EXCURSION:   13.883 mm     (> 18.000)

MV EF SLOPE:   55 mm/s     (70 - 150)

EPSS:   0.5 cm

MV E Blayne:   0.56 m/s

MV DecT:   313 ms

MV A Blayne:   0.92 m/s

MV E/A Ratio:   0.61 

AV maxP.36 mmHg

AV meanP.21 mmHg

AR PHT:   632 ms

RAP:   5.00 mmHg

RVSP:   23.31 mmHg







FINDINGS

--------

Sinus rhythm.

This was a technically good study.

LV size, wall thickness and systolic function are normal, with an EF greater than 55%.   The left jenna
tricular size is normal.

The right ventricle is normal in size.

Normal LA  size by volume 22+/-6 ml/m2.

The right atrial size is normal.

There is mild aortic regurgitation.   There is mild aortic stenosis present.   Peak/mean gradient acr
oss the Aortic Valve is 22.36mmHg / 13.21mmHg.

Mild mitral regurgitation is present.

Mild tricuspid regurgitation present.   Right ventricular systolic pressure is normal at < 35 mmHg.

There is no pulmonic regurgitation present.

The aortic root size is normal.

There is no pericardial effusion.



CONCLUSIONS

--------

1. LV size, wall thickness and systolic function are normal, with an EF greater than 55%.

2. The left ventricular size is normal.

3. The right ventricle is normal in size.

4. Normal LA size by volume 22+/-6 ml/m2.

5. The right atrial size is normal.

6. There is mild aortic regurgitation.

7. There is mild aortic stenosis present.

8. Peak/mean gradient across the Aortic Valve is 22.36mmHg / 13.21mmHg.

9. Mild mitral regurgitation is present.

10. Mild tricuspid regurgitation present.

11. There is no pulmonic regurgitation present.

12. The aortic root size is normal.

13. There is no pericardial effusion.





SONOGRAPHER: Miranda Lundberg RDCS

## 2021-03-19 NOTE — XR
EXAMINATION TYPE: XR calcaneus 2V LT

 

DATE OF EXAM: 3/19/2021

 

COMPARISON: NONE

 

HISTORY: Pain.

 

TECHNIQUE: 2 views left calcaneus.

 

FINDINGS: No acute fracture or dislocation is seen. Bohler's angle is maintained. Some overlying tong
rial vascular calcification. No calcaneal spurring.

 

IMPRESSION: As above.

## 2021-04-23 ENCOUNTER — HOSPITAL ENCOUNTER (OUTPATIENT)
Dept: HOSPITAL 47 - RADUSWWP | Age: 76
Discharge: HOME | End: 2021-04-23
Attending: FAMILY MEDICINE
Payer: MEDICARE

## 2021-04-23 DIAGNOSIS — M79.671: Primary | ICD-10-CM

## 2021-04-23 PROCEDURE — 93922 UPR/L XTREMITY ART 2 LEVELS: CPT

## 2021-04-28 NOTE — P.ARTDOP
Arterial Doppler





LOWER EXTREMITY ARTERIAL DOPPLER:





DATE OF SERVICE: 04/23/2021





Reason for study: Right heel pain.





Doppler waveforms: Multiphasic bilaterally throughout.





Pulse volume recording: [].





Pressure gradients:none.





Ankle-brachial indices:Greater than 1.





Toe brachial indices: [] on the right, [] on the left





Impression: Normal study.

## 2021-05-11 ENCOUNTER — HOSPITAL ENCOUNTER (OUTPATIENT)
Dept: HOSPITAL 47 - LABWHC1 | Age: 76
Discharge: HOME | End: 2021-05-11
Attending: PEDIATRICS
Payer: MEDICARE

## 2021-05-11 DIAGNOSIS — E78.2: ICD-10-CM

## 2021-05-11 DIAGNOSIS — I12.9: ICD-10-CM

## 2021-05-11 DIAGNOSIS — E03.9: ICD-10-CM

## 2021-05-11 DIAGNOSIS — E11.22: Primary | ICD-10-CM

## 2021-05-11 DIAGNOSIS — N18.30: ICD-10-CM

## 2021-05-11 LAB
ALP SERPL-CCNC: 38 U/L (ref 41–126)
ALT SERPL-CCNC: 57 U/L (ref 8–44)
AST SERPL-CCNC: 47 U/L (ref 13–35)
CHOLEST SERPL-MCNC: 143 MG/DL (ref 0–200)
CK SERPL-CCNC: 91 U/L (ref 26–186)
GLUCOSE SERPL-MCNC: 146 MG/DL (ref 70–110)
HBA1C MFR BLD: 6.5 % (ref 4–6)
HDLC SERPL-MCNC: 27 MG/DL (ref 40–60)
TRIGL SERPL-MCNC: 420 MG/DL (ref 0–149)
URATE SERPL-MCNC: 4.2 MG/DL (ref 2.9–7.7)

## 2021-05-11 PROCEDURE — 84550 ASSAY OF BLOOD/URIC ACID: CPT

## 2021-05-11 PROCEDURE — 82565 ASSAY OF CREATININE: CPT

## 2021-05-11 PROCEDURE — 84075 ASSAY ALKALINE PHOSPHATASE: CPT

## 2021-05-11 PROCEDURE — 84443 ASSAY THYROID STIM HORMONE: CPT

## 2021-05-11 PROCEDURE — 82172 ASSAY OF APOLIPOPROTEIN: CPT

## 2021-05-11 PROCEDURE — 82306 VITAMIN D 25 HYDROXY: CPT

## 2021-05-11 PROCEDURE — 83036 HEMOGLOBIN GLYCOSYLATED A1C: CPT

## 2021-05-11 PROCEDURE — 80061 LIPID PANEL: CPT

## 2021-05-11 PROCEDURE — 83695 ASSAY OF LIPOPROTEIN(A): CPT

## 2021-05-11 PROCEDURE — 84450 TRANSFERASE (AST) (SGOT): CPT

## 2021-05-11 PROCEDURE — 84460 ALANINE AMINO (ALT) (SGPT): CPT

## 2021-05-11 PROCEDURE — 82947 ASSAY GLUCOSE BLOOD QUANT: CPT

## 2021-05-11 PROCEDURE — 36415 COLL VENOUS BLD VENIPUNCTURE: CPT

## 2021-05-11 PROCEDURE — 82550 ASSAY OF CK (CPK): CPT

## 2021-05-11 PROCEDURE — 83721 ASSAY OF BLOOD LIPOPROTEIN: CPT

## 2021-07-29 ENCOUNTER — HOSPITAL ENCOUNTER (OUTPATIENT)
Dept: HOSPITAL 47 - LABWHC1 | Age: 76
Discharge: HOME | End: 2021-07-29
Attending: PEDIATRICS
Payer: MEDICARE

## 2021-07-29 DIAGNOSIS — E03.9: ICD-10-CM

## 2021-07-29 DIAGNOSIS — R73.01: ICD-10-CM

## 2021-07-29 DIAGNOSIS — I10: ICD-10-CM

## 2021-07-29 DIAGNOSIS — E78.2: Primary | ICD-10-CM

## 2021-07-29 DIAGNOSIS — R74.8: ICD-10-CM

## 2021-07-29 DIAGNOSIS — N18.30: ICD-10-CM

## 2021-07-29 LAB
ALBUMIN SERPL-MCNC: 4.4 G/DL (ref 3.8–4.9)
ALP SERPL-CCNC: 69 U/L (ref 41–126)
ALT SERPL-CCNC: 80 U/L (ref 8–44)
ANION GAP SERPL CALC-SCNC: 13.8 MMOL/L (ref 4–12)
AST SERPL-CCNC: 70 U/L (ref 13–35)
BASOPHILS # BLD AUTO: 0.02 X 10*3/UL (ref 0–0.1)
BASOPHILS NFR BLD AUTO: 0.5 %
BUN SERPL-SCNC: 24 MG/DL (ref 9–27)
BUN/CREAT SERPL: 21.82 RATIO (ref 12–20)
CALCIUM SPEC-MCNC: 9.2 MG/DL (ref 8.7–10.3)
CHLORIDE SERPL-SCNC: 101 MMOL/L (ref 96–109)
CHOLEST SERPL-MCNC: 156 MG/DL (ref 0–200)
CK SERPL-CCNC: 107 U/L (ref 26–186)
CO2 SERPL-SCNC: 20.2 MMOL/L (ref 21.6–31.8)
EOSINOPHIL # BLD AUTO: 0.34 X 10*3/UL (ref 0.04–0.35)
EOSINOPHIL NFR BLD AUTO: 8.4 %
ERYTHROCYTE [DISTWIDTH] IN BLOOD BY AUTOMATED COUNT: 4.2 X 10*6/UL (ref 4.1–5.2)
ERYTHROCYTE [DISTWIDTH] IN BLOOD: 14.8 % (ref 11.5–14.5)
GLUCOSE SERPL-MCNC: 235 MG/DL (ref 70–110)
HCT VFR BLD AUTO: 34.9 % (ref 37.2–46.3)
HDLC SERPL-MCNC: 25 MG/DL (ref 40–60)
HGB BLD-MCNC: 11 G/DL (ref 12–15)
LYMPHOCYTES # SPEC AUTO: 1.14 X 10*3/UL (ref 0.9–5)
LYMPHOCYTES NFR SPEC AUTO: 28 %
MCH RBC QN AUTO: 26.2 PG (ref 27–32)
MCHC RBC AUTO-ENTMCNC: 31.5 G/DL (ref 32–37)
MCV RBC AUTO: 83.1 FL (ref 80–97)
MONOCYTES # BLD AUTO: 0.42 X 10*3/UL (ref 0.2–1)
MONOCYTES NFR BLD AUTO: 10.3 %
NEUTROPHILS # BLD AUTO: 2.14 X 10*3/UL (ref 1.8–7.7)
NEUTROPHILS NFR BLD AUTO: 52.6 %
PLATELET # BLD AUTO: 234 X 10*3/UL (ref 140–440)
POTASSIUM SERPL-SCNC: 4.9 MMOL/L (ref 3.5–5.5)
PROT/CREAT UR-RTO: 0.2
SODIUM SERPL-SCNC: 135 MMOL/L (ref 135–145)
TRIGL SERPL-MCNC: 1021 MG/DL (ref 0–149)
URATE SERPL-MCNC: 6.9 MG/DL (ref 2.9–7.7)
WBC # BLD AUTO: 4.07 X 10*3/UL (ref 4.5–10)

## 2021-07-29 PROCEDURE — 82570 ASSAY OF URINE CREATININE: CPT

## 2021-07-29 PROCEDURE — 85025 COMPLETE CBC W/AUTO DIFF WBC: CPT

## 2021-07-29 PROCEDURE — 83970 ASSAY OF PARATHORMONE: CPT

## 2021-07-29 PROCEDURE — 84156 ASSAY OF PROTEIN URINE: CPT

## 2021-07-29 PROCEDURE — 84460 ALANINE AMINO (ALT) (SGPT): CPT

## 2021-07-29 PROCEDURE — 84450 TRANSFERASE (AST) (SGOT): CPT

## 2021-07-29 PROCEDURE — 36415 COLL VENOUS BLD VENIPUNCTURE: CPT

## 2021-07-29 PROCEDURE — 83721 ASSAY OF BLOOD LIPOPROTEIN: CPT

## 2021-07-29 PROCEDURE — 82247 BILIRUBIN TOTAL: CPT

## 2021-07-29 PROCEDURE — 82306 VITAMIN D 25 HYDROXY: CPT

## 2021-07-29 PROCEDURE — 84443 ASSAY THYROID STIM HORMONE: CPT

## 2021-07-29 PROCEDURE — 82172 ASSAY OF APOLIPOPROTEIN: CPT

## 2021-07-29 PROCEDURE — 80061 LIPID PANEL: CPT

## 2021-07-29 PROCEDURE — 84075 ASSAY ALKALINE PHOSPHATASE: CPT

## 2021-07-29 PROCEDURE — 84550 ASSAY OF BLOOD/URIC ACID: CPT

## 2021-07-29 PROCEDURE — 82550 ASSAY OF CK (CPK): CPT

## 2021-07-29 PROCEDURE — 80069 RENAL FUNCTION PANEL: CPT

## 2021-10-07 ENCOUNTER — HOSPITAL ENCOUNTER (OUTPATIENT)
Dept: HOSPITAL 47 - RADMAMWWP | Age: 76
Discharge: HOME | End: 2021-10-07
Attending: FAMILY MEDICINE
Payer: MEDICARE

## 2021-10-07 ENCOUNTER — HOSPITAL ENCOUNTER (OUTPATIENT)
Dept: HOSPITAL 47 - RADUSWWP | Age: 76
Discharge: HOME | End: 2021-10-07
Attending: PSYCHIATRY & NEUROLOGY
Payer: MEDICARE

## 2021-10-07 DIAGNOSIS — Z80.3: ICD-10-CM

## 2021-10-07 DIAGNOSIS — I65.23: Primary | ICD-10-CM

## 2021-10-07 DIAGNOSIS — Z78.0: ICD-10-CM

## 2021-10-07 DIAGNOSIS — Z12.31: Primary | ICD-10-CM

## 2021-10-07 PROCEDURE — 93880 EXTRACRANIAL BILAT STUDY: CPT

## 2021-10-07 PROCEDURE — 77067 SCR MAMMO BI INCL CAD: CPT

## 2021-10-07 PROCEDURE — 77063 BREAST TOMOSYNTHESIS BI: CPT

## 2021-10-07 NOTE — US
EXAMINATION TYPE: US carotid duplex BILAT

 

DATE OF EXAM: 10/7/2021

 

COMPARISON: NONE

 

CLINICAL HISTORY: 76-year-old female Abnormal VAT R94.39.

 

TECHNIQUE: Carotid duplex ultrasound examination. In direct Doppler criteria is utilized.

 

FINDINGS:

 

EXAM MEASUREMENTS: 

 

RIGHT:  Peak Systolic Velocity (PSV) cm/sec

----- Right CCA:  61.8  

----- Right ICA:  87.9     

----- Right ECA:  87.3   

ICA/CCA ratio:  1.4    

 

RIGHT:  End Diastole cm/sec

----- Right CCA:  13.7   

----- Right ICA:  24.9      

----- Right ECA:  10.7     

 

LEFT:  Peak Systolic Velocity (PSV) cm/sec

----- Left CCA:  65.9  

----- Left ICA:  75.4   

----- Left ECA:  86.7  

ICA/CCA ratio:  1.1  

 

LEFT:  End Diastole cm/sec

----- Left CCA:  17.2  

----- Left ICA:  20.8   

----- Left ECA:  17.2 

 

VERTEBRALS (direction of flow):

Right Vertebral: Antegrade

Left Vertebral: Antegrade

 

Rhythm:  Normal

 

Sonographer notes: Small amount of plaque in bilateral bulbs  No elevated velocities

 

 

 

IMPRESSION:  

No hemodynamically significant internal carotid artery stenosis on either side.

   

 

 

Criteria for Assigning % of Stenosis / Diameter reduction

(Estimation based on the indirect measurements of the internal carotid artery velocities (ICA PSV).

1.  Normal (no stenosis)=ICA PSV < 125 cm/s: ratio < 2.0: ICA EDV<40 cm/s.

2. Less than 50% stenosis=ICA PSV < 125 cm/s: ratio < 2.0: ICA EDV<40 cm/s.

3.  50 to 69% stenosis=ICA PSV of 125 to 230 cm/s: ration 2.0 ? 4.0: ICA EDV  cm/s.

4.  Greater than 70% stenosis to near occlusion= ICA PSV > 230 cm/s: ratio > 4.0: ICA EDV > 100 cm/s.
 

5.  Near occlusion= ICA PSV velocities may be low or undetectable: variable ratio and ICA EDV.

6.  Total occlusion=unable to detect flow.

## 2021-10-08 NOTE — MM
Reason for exam: screening  (asymptomatic).

Last mammogram was performed 1 year and 1 month ago.



History:

Patient is postmenopausal and history of other cancer.

Family history of breast cancer in paternal aunt and breast cancer in paternal 

grandmother.



Physical Findings:

A clinical breast exam by your physician is recommended on an annual basis and 

results should be correlated with mammographic findings.



MG 3D Screening Mammo W/Cad

Bilateral CC and MLO view(s) were taken.

Prior study comparison: September 18, 2020, bilateral MG 3d screening mammo w/cad.

August 5, 2019, mammogram, performed at [place].

The breast tissue is heterogeneously dense. This may lower the sensitivity of 

mammography.

Finding #1: There is a 4 mm equal density (isodense) mass in the central position 

of the left breast.

Finding #2: There are typically benign calcifications in both breasts.





ASSESSMENT: Incomplete: need additional imaging evaluation, BI-RAD 0



RECOMMENDATION:

Special view mammogram of the left breast.



If lesion persists on supplemental views, image directed ultrasound is 

recommended.



[facility] will attempt to contact patient to return for supplemental 

views and ultrasound if indicated.

## 2021-10-11 ENCOUNTER — HOSPITAL ENCOUNTER (OUTPATIENT)
Dept: HOSPITAL 47 - RADMAMWWP | Age: 76
Discharge: HOME | End: 2021-10-11
Attending: FAMILY MEDICINE
Payer: MEDICARE

## 2021-10-11 DIAGNOSIS — Z80.3: ICD-10-CM

## 2021-10-11 DIAGNOSIS — R92.8: Primary | ICD-10-CM

## 2021-10-11 DIAGNOSIS — Z78.0: ICD-10-CM

## 2021-10-11 PROCEDURE — 77061 BREAST TOMOSYNTHESIS UNI: CPT

## 2021-10-11 PROCEDURE — 77065 DX MAMMO INCL CAD UNI: CPT

## 2021-10-12 NOTE — MM
Reason for exam: additional evaluation requested from abnormal screening.

Last mammogram was performed less than 1 month ago.



History:

Patient is postmenopausal and history of other cancer.

Family history of breast cancer in paternal aunt and breast cancer in paternal 

grandmother.

Took estrogen beginning at age 46.



Physical Findings:

Nurse did not find any significant physical abnormalities on exam.



MG 3D Work Up W/Cad LT

Spot compression CC, spot compression MLO, and LM view(s) were taken of the left 

breast.

Prior study comparison: October 7, 2021, bilateral MG 3d screening mammo w/cad.  

September 18, 2020, bilateral MG 3d screening mammo w/cad.

There is no discrete abnormality.



These results were verbally communicated with the patient and result sheet given 

to the patient on 10/11/21.





ASSESSMENT: Benign, BI-RAD 2



RECOMMENDATION:

Return to routine screening mammogram schedule for both breasts.

## 2021-11-17 ENCOUNTER — HOSPITAL ENCOUNTER (INPATIENT)
Dept: HOSPITAL 47 - EC | Age: 76
LOS: 11 days | Discharge: HOME | DRG: 177 | End: 2021-11-28
Attending: INTERNAL MEDICINE | Admitting: INTERNAL MEDICINE
Payer: MEDICARE

## 2021-11-17 VITALS — BODY MASS INDEX: 27 KG/M2

## 2021-11-17 DIAGNOSIS — E03.9: ICD-10-CM

## 2021-11-17 DIAGNOSIS — Z79.84: ICD-10-CM

## 2021-11-17 DIAGNOSIS — E87.1: ICD-10-CM

## 2021-11-17 DIAGNOSIS — Z79.02: ICD-10-CM

## 2021-11-17 DIAGNOSIS — A08.39: ICD-10-CM

## 2021-11-17 DIAGNOSIS — J12.82: ICD-10-CM

## 2021-11-17 DIAGNOSIS — Z94.4: ICD-10-CM

## 2021-11-17 DIAGNOSIS — Z79.4: ICD-10-CM

## 2021-11-17 DIAGNOSIS — Z79.890: ICD-10-CM

## 2021-11-17 DIAGNOSIS — Z79.899: ICD-10-CM

## 2021-11-17 DIAGNOSIS — J96.01: ICD-10-CM

## 2021-11-17 DIAGNOSIS — Z90.49: ICD-10-CM

## 2021-11-17 DIAGNOSIS — G93.41: ICD-10-CM

## 2021-11-17 DIAGNOSIS — I10: ICD-10-CM

## 2021-11-17 DIAGNOSIS — Z88.8: ICD-10-CM

## 2021-11-17 DIAGNOSIS — G93.1: ICD-10-CM

## 2021-11-17 DIAGNOSIS — Z90.710: ICD-10-CM

## 2021-11-17 DIAGNOSIS — Z91.041: ICD-10-CM

## 2021-11-17 DIAGNOSIS — E11.9: ICD-10-CM

## 2021-11-17 DIAGNOSIS — U07.1: Primary | ICD-10-CM

## 2021-11-17 DIAGNOSIS — Z86.73: ICD-10-CM

## 2021-11-17 DIAGNOSIS — E78.5: ICD-10-CM

## 2021-11-17 DIAGNOSIS — K75.9: ICD-10-CM

## 2021-11-17 LAB
ALBUMIN SERPL-MCNC: 3.6 G/DL (ref 3.5–5)
ALP SERPL-CCNC: 101 U/L (ref 38–126)
ALT SERPL-CCNC: 36 U/L (ref 4–34)
ANION GAP SERPL CALC-SCNC: 15 MMOL/L
APTT BLD: 25.7 SEC (ref 22–30)
AST SERPL-CCNC: 83 U/L (ref 14–36)
BASOPHILS # BLD AUTO: 0 K/UL (ref 0–0.2)
BASOPHILS NFR BLD AUTO: 0 %
BUN SERPL-SCNC: 18 MG/DL (ref 7–17)
CALCIUM SPEC-MCNC: 8.7 MG/DL (ref 8.4–10.2)
CHLORIDE SERPL-SCNC: 103 MMOL/L (ref 98–107)
CO2 SERPL-SCNC: 17 MMOL/L (ref 22–30)
EOSINOPHIL # BLD AUTO: 0 K/UL (ref 0–0.7)
EOSINOPHIL NFR BLD AUTO: 0 %
ERYTHROCYTE [DISTWIDTH] IN BLOOD BY AUTOMATED COUNT: 3.94 M/UL (ref 3.8–5.4)
ERYTHROCYTE [DISTWIDTH] IN BLOOD: 15.4 % (ref 11.5–15.5)
GLUCOSE SERPL-MCNC: 183 MG/DL (ref 74–99)
HCT VFR BLD AUTO: 31 % (ref 34–46)
HGB BLD-MCNC: 10.5 GM/DL (ref 11.4–16)
INR PPP: 1 (ref ?–1.2)
LYMPHOCYTES # SPEC AUTO: 0.5 K/UL (ref 1–4.8)
LYMPHOCYTES NFR SPEC AUTO: 11 %
MAGNESIUM SPEC-SCNC: 1.2 MG/DL (ref 1.6–2.3)
MCH RBC QN AUTO: 26.5 PG (ref 25–35)
MCHC RBC AUTO-ENTMCNC: 33.8 G/DL (ref 31–37)
MCV RBC AUTO: 78.5 FL (ref 80–100)
MONOCYTES # BLD AUTO: 0.1 K/UL (ref 0–1)
MONOCYTES NFR BLD AUTO: 3 %
NEUTROPHILS # BLD AUTO: 3.8 K/UL (ref 1.3–7.7)
NEUTROPHILS NFR BLD AUTO: 85 %
PLATELET # BLD AUTO: 261 K/UL (ref 150–450)
POTASSIUM SERPL-SCNC: 4.7 MMOL/L (ref 3.5–5.1)
PROT SERPL-MCNC: 6.7 G/DL (ref 6.3–8.2)
PT BLD: 11 SEC (ref 9–12)
SODIUM SERPL-SCNC: 135 MMOL/L (ref 137–145)
WBC # BLD AUTO: 4.5 K/UL (ref 3.8–10.6)

## 2021-11-17 PROCEDURE — 71045 X-RAY EXAM CHEST 1 VIEW: CPT

## 2021-11-17 PROCEDURE — 36415 COLL VENOUS BLD VENIPUNCTURE: CPT

## 2021-11-17 PROCEDURE — 82607 VITAMIN B-12: CPT

## 2021-11-17 PROCEDURE — 82746 ASSAY OF FOLIC ACID SERUM: CPT

## 2021-11-17 PROCEDURE — 94640 AIRWAY INHALATION TREATMENT: CPT

## 2021-11-17 PROCEDURE — 84145 PROCALCITONIN (PCT): CPT

## 2021-11-17 PROCEDURE — 83880 ASSAY OF NATRIURETIC PEPTIDE: CPT

## 2021-11-17 PROCEDURE — 80076 HEPATIC FUNCTION PANEL: CPT

## 2021-11-17 PROCEDURE — 85730 THROMBOPLASTIN TIME PARTIAL: CPT

## 2021-11-17 PROCEDURE — 94760 N-INVAS EAR/PLS OXIMETRY 1: CPT

## 2021-11-17 PROCEDURE — 71046 X-RAY EXAM CHEST 2 VIEWS: CPT

## 2021-11-17 PROCEDURE — 84484 ASSAY OF TROPONIN QUANT: CPT

## 2021-11-17 PROCEDURE — 83735 ASSAY OF MAGNESIUM: CPT

## 2021-11-17 PROCEDURE — 80053 COMPREHEN METABOLIC PANEL: CPT

## 2021-11-17 PROCEDURE — 70450 CT HEAD/BRAIN W/O DYE: CPT

## 2021-11-17 PROCEDURE — 93005 ELECTROCARDIOGRAM TRACING: CPT

## 2021-11-17 PROCEDURE — 85025 COMPLETE CBC W/AUTO DIFF WBC: CPT

## 2021-11-17 PROCEDURE — 85610 PROTHROMBIN TIME: CPT

## 2021-11-17 PROCEDURE — 80048 BASIC METABOLIC PNL TOTAL CA: CPT

## 2021-11-17 PROCEDURE — 93970 EXTREMITY STUDY: CPT

## 2021-11-17 PROCEDURE — 82140 ASSAY OF AMMONIA: CPT

## 2021-11-17 PROCEDURE — 86140 C-REACTIVE PROTEIN: CPT

## 2021-11-17 PROCEDURE — 70551 MRI BRAIN STEM W/O DYE: CPT

## 2021-11-17 PROCEDURE — 85379 FIBRIN DEGRADATION QUANT: CPT

## 2021-11-17 PROCEDURE — 99285 EMERGENCY DEPT VISIT HI MDM: CPT

## 2021-11-17 PROCEDURE — 83615 LACTATE (LD) (LDH) ENZYME: CPT

## 2021-11-17 RX ADMIN — MECLIZINE HYDROCHLORIDE SCH MG: 25 TABLET ORAL at 21:09

## 2021-11-17 RX ADMIN — ASPIRIN SCH: 325 TABLET ORAL at 21:25

## 2021-11-17 RX ADMIN — ACETAMINOPHEN PRN MG: 500 TABLET ORAL at 16:10

## 2021-11-17 RX ADMIN — FAMOTIDINE SCH MG: 20 TABLET, FILM COATED ORAL at 21:09

## 2021-11-17 RX ADMIN — MYCOPHENOLIC ACID SCH MG: 180 TABLET, DELAYED RELEASE ORAL at 21:22

## 2021-11-17 RX ADMIN — DEXTROSE SCH MG: 50 INJECTION, SOLUTION INTRAVENOUS at 16:04

## 2021-11-17 RX ADMIN — METOPROLOL TARTRATE SCH MG: 50 TABLET, FILM COATED ORAL at 21:09

## 2021-11-17 NOTE — P.HPIM
History of Present Illness





This is a pleasant 76 results female with past medical history of 

hypothyroidism, liver transplant, CVA/TIA, hypertension, diabetes mellitus, 

hyperlipidemia


Patient presents because of difficulty breathing for about a week associated 

with cough and yellowish phlegm but no chest pain.


She had diarrhea like 3 times this morning.  She has diarrhea for 3 days.  No 

abdominal pain or vomiting.


She went to urgent care last Sunday about 3 days ago and they were diagnosed her

with pneumonia, next day her Covid test came back positive she denies history of

smoking, alcohol or illicit drugs.  She does not have history of COPD.  She is 

not on home oxygen





She is hypoxic at 83% on room air and tachypneic with a breathing rate 30-40, ta

chycardic 122.  She is afebrile at 97.8.


Labs unremarkable including CBC, INR, BMP.  Liver enzymes slightly elevated with

AST 83 and ALT 36.  Bilirubin is normal 1.0.


Chest x-ray: Bilateral infiltrate with interstitial prominence has increased








Review of Systems





CONSTITUTIONAL: No fever, no malaise, no fatigue. 


HEENT: No recent visual problems or hearing problems. Denied any sore throat. 


CARDIOVASCULAR: No  orthopnea, PND, no palpitations, no syncope. 


PULMONARY: No used wall tenderness, no hemoptysis. 


GASTROINTESTINAL: No diarrhea, no nausea, no vomiting, no abdominal pain. 

Normoactive bowel sounds. 


NEUROLOGICAL: No headaches, no weakness, no numbness. 


HEMATOLOGICAL: Denies any bleeding or petechiae. 


GENITOURINARY: Denies any burning micturition, frequency, or urgency. 


MUSCULOSKELETAL/RHEUMATOLOGICAL: Denies any joint pain, swelling, or any muscle 

pain. 


ENDOCRINE: Denies any polyuria or polydipsia.











Past Medical History


Past Medical History: CVA/TIA, Hypertension


History of Any Multi-Drug Resistant Organisms: None Reported


Past Surgical History: Appendectomy, Cholecystectomy, Hysterectomy


Additional Past Surgical History / Comment(s): D&C, liver transplant


Past Psychological History: No Psychological Hx Reported


Smoking Status: Never smoker


Past Alcohol Use History: None Reported


Past Drug Use History: None Reported





Medications and Allergies


                                Home Medications











 Medication  Instructions  Recorded  Confirmed  Type


 


Acetaminophen Tab [Tylenol] 1,000 mg PO BID PRN 11/11/20 11/17/21 History


 


Calcium Carbonate/Vitamin D3 1 tab PO DAILY 11/11/20 11/17/21 History





[Calcium 500-Vit D3 5 Mcg (200 Iu)]    


 


Cephalexin [Keflex] 250 mg PO DAILY 11/11/20 11/17/21 History


 


Clopidogrel [Plavix] 75 mg PO DAILY 11/11/20 11/17/21 History


 


Furosemide [Lasix] 20 mg PO DAILY PRN 11/11/20 11/17/21 History


 


Glucosamine/Chondr Coffey A Sod [Osteo 2 tab PO DAILY 11/11/20 11/17/21 History





Bi-Flex Caplet]    


 


Insulin Glargine [Lantus Vial] 40 unit SQ DAILY 11/11/20 11/17/21 History


 


Levothyroxine Sodium [Synthroid] 50 mcg PO DAILY 11/11/20 11/17/21 History


 


Lisinopril [Prinivil] 10 mg PO DAILY 11/11/20 11/17/21 History


 


Magnesium Oxide 400 mg PO DAILY 11/11/20 11/17/21 History


 


Metoprolol Tartrate [Lopressor] 50 mg PO BID 11/11/20 11/17/21 History


 


Multivitamins, Thera [Multivitamin 1 tab PO DAILY 11/11/20 11/17/21 History





(formulary)]    


 


Mycophenolate Sodium [Mycophenolic 360 mg PO BID 11/11/20 11/17/21 History





Acid]    


 


Pantoprazole Sodium [Protonix] 40 mg PO AC-BRKFST 11/11/20 11/17/21 History


 


Vit C/E/Zn/Coppr/Lutein/Zeaxan 1 cap PO DAILY 11/11/20 11/17/21 History





[Preservision Areds 2 Softgel]    


 


Zinc Gluconate [Zinc] 50 mg PO DAILY 11/11/20 11/17/21 History


 


cycloSPORINE [SandIMMUNE] 25 mg PO DAILY 11/11/20 11/17/21 History


 


cycloSPORINE [SandIMMUNE] 50 mg PO HS 11/11/20 11/17/21 History


 


sitaGLIPtin [Januvia] 100 mg PO DAILY 11/11/20 11/17/21 History


 


Amoxic-Pot Clav 875-125Mg 1 tab PO BID 11/17/21 11/17/21 History





[Augmentin 875-125]    


 


Amoxicillin 2,000 mg PO ONCE PRN 11/17/21 11/17/21 History


 


Famotidine [Pepcid] 20 mg PO BID 11/17/21 11/17/21 History


 


Fluoride (Sodium) [Sodium Fluoride 1 applic DENTAL BID 11/17/21 11/17/21 History





5000 Plus]    


 


Insulin Regular, Human [Novolin R See Protocol SQ AC-TID 11/17/21 11/17/21 

History





Flexpen]    


 


Meclizine [Antivert] 25 mg PO BID 11/17/21 11/17/21 History


 


Omega-3 Acid Ethyl Esters [Lovaza] 2 gm PO BID 11/17/21 11/17/21 History


 


Rosuvastatin [Crestor] 10 mg PO HS 11/17/21 11/17/21 History








                                    Allergies











Allergy/AdvReac Type Severity Reaction Status Date / Time


 


Iodinated Contrast Media Allergy  Rash/Hives Verified 11/17/21 12:08


 


pioglitazone [From Actos] Allergy  Rash/Hives Verified 11/17/21 12:08


 


pentazocine [From Talwin] AdvReac  Vomiting Verified 11/17/21 12:08














Physical Exam


Vitals: 


                                   Vital Signs











  Temp Pulse Resp BP Pulse Ox


 


 11/17/21 13:50   122 H  40 H   83 L


 


 11/17/21 13:45    30 H   92 L


 


 11/17/21 13:00   86  34 H  152/77  96


 


 11/17/21 12:02   80  18  164/78  98


 


 11/17/21 11:17    22  


 


 11/17/21 10:58  97.8 F  89  20  122/73  92 L








                                Intake and Output











 11/16/21 11/17/21 11/17/21





 22:59 06:59 14:59


 


Other:   


 


  Weight   64.864 kg














GENERAL: The patient is alert and oriented x3, not in any acute distress. Well 

developed, well nourished. 


HEENT: Pupils are round and equally reacting to light. EOMI. No scleral icterus.

No conjunctival pallor. Normocephalic, atraumatic. No pharyngeal erythema. No 

thyromegaly. 


CARDIOVASCULAR: S1 and S2 present. No murmurs, rubs, or gallops. 


PULMONARY: Chest is clear to auscultation, no wheezing or crackles. 


ABDOMEN: Soft, nontender, nondistended, normoactive bowel sounds. No palpable 

organomegaly. 


MUSCULOSKELETAL: No joint swelling or deformity. 


EXTREMITIES: No cyanosis, clubbing, or pedal edema. 


NEUROLOGICAL: Gross neurological examination did not reveal any focal deficits. 


SKIN: No rashes. No petechiae











Results


CBC & Chem 7: 


                                 11/17/21 11:46





                                 11/17/21 11:46


Labs: 


                  Abnormal Lab Results - Last 24 Hours (Table)











  11/17/21 11/17/21 Range/Units





  11:46 11:46 


 


Hgb  10.5 L   (11.4-16.0)  gm/dL


 


Hct  31.0 L   (34.0-46.0)  %


 


MCV  78.5 L   (80.0-100.0)  fL


 


Lymphocytes #  0.5 L   (1.0-4.8)  k/uL


 


Sodium   135 L  (137-145)  mmol/L


 


Carbon Dioxide   17 L  (22-30)  mmol/L


 


BUN   18 H  (7-17)  mg/dL


 


Glucose   183 H  (74-99)  mg/dL


 


Magnesium   1.2 L  (1.6-2.3)  mg/dL


 


AST   83 H  (14-36)  U/L


 


ALT   36 H  (4-34)  U/L














Assessment and Plan


Assessment: 








Bilateral acute covid pneumonia


Acute hypoxic respiratory failure


Possible viral gastroenteritis secondary to Covid with mildly elevated liver 

enzymes


Hypothyroidism


History of liver transplant


Hypertension


History of CVA/TIA


Hyperlipidemia


Diabetes mellitus, on insulin

















Plan: 





This is a pleasant 76 years old female who presents with covid pneumonia


Continue with dexamethasone


Continue with vitamin C, D and zinc


Pulmonary consult


Labs and medication were reviewed..  Continue same treatment.  Continue with 

symptomatic treatment.  Resume home medication.  Monitor lytes and vitals.  DVT 

and GI prophylaxis.  Further recommendations depends on the clinical course of 

the patient


DVT prophylaxis: Subcutaneous Lovenox


GI Prophylaxis: Pepcid


Prognosis is guarded

## 2021-11-17 NOTE — ED
General Adult HPI





- General


Chief complaint: Shortness of Breath


Stated complaint: sob/covid+


Time Seen by Provider: 11/17/21 11:05


Source: patient, family, RN notes reviewed


Mode of arrival: wheelchair


Limitations: no limitations





- History of Present Illness


Initial comments: 


This a 76-year-old female presents emergency from chief complaint of COVID-19.  

Patient states she recently tested positive at symptoms for proximal one week.  

Patient states is progressively getting worse she was seen at the [facility], [place] urgent 

care who gave her antibiotics for pneumonia.  Patient states symptoms worsened 

today increasing shortness of breath.  Patient denies any GI symptoms.  Patient 

states her symptoms are exertional.








- Related Data


                                Home Medications











 Medication  Instructions  Recorded  Confirmed


 


Acetaminophen Tab [Tylenol] 1,000 mg PO BID PRN 11/11/20 11/17/21


 


Calcium Carbonate/Vitamin D3 1 tab PO DAILY 11/11/20 11/17/21





[Calcium 500-Vit D3 5 Mcg (200 Iu)]   


 


Cephalexin [Keflex] 250 mg PO DAILY 11/11/20 11/17/21


 


Clopidogrel [Plavix] 75 mg PO DAILY 11/11/20 11/17/21


 


Furosemide [Lasix] 20 mg PO DAILY PRN 11/11/20 11/17/21


 


Glucosamine/Chondr Coffey A Sod [Osteo 2 tab PO DAILY 11/11/20 11/17/21





Bi-Flex Caplet]   


 


Insulin Glargine [Lantus Vial] 40 unit SQ DAILY 11/11/20 11/17/21


 


Levothyroxine Sodium [Synthroid] 50 mcg PO DAILY 11/11/20 11/17/21


 


Lisinopril [Prinivil] 10 mg PO DAILY 11/11/20 11/17/21


 


Magnesium Oxide 400 mg PO DAILY 11/11/20 11/17/21


 


Metoprolol Tartrate [Lopressor] 50 mg PO BID 11/11/20 11/17/21


 


Multivitamins, Thera [Multivitamin 1 tab PO DAILY 11/11/20 11/17/21





(formulary)]   


 


Mycophenolate Sodium [Mycophenolic 360 mg PO BID 11/11/20 11/17/21





Acid]   


 


Pantoprazole Sodium [Protonix] 40 mg PO AC-BRKFST 11/11/20 11/17/21


 


Vit C/E/Zn/Coppr/Lutein/Zeaxan 1 cap PO DAILY 11/11/20 11/17/21





[Preservision Areds 2 Softgel]   


 


Zinc Gluconate [Zinc] 50 mg PO DAILY 11/11/20 11/17/21


 


cycloSPORINE [SandIMMUNE] 25 mg PO DAILY 11/11/20 11/17/21


 


cycloSPORINE [SandIMMUNE] 50 mg PO HS 11/11/20 11/17/21


 


sitaGLIPtin [Januvia] 100 mg PO DAILY 11/11/20 11/17/21


 


Amoxic-Pot Clav 875-125Mg 1 tab PO BID 11/17/21 11/17/21





[Augmentin 875-125]   


 


Amoxicillin 2,000 mg PO ONCE PRN 11/17/21 11/17/21


 


Famotidine [Pepcid] 20 mg PO BID 11/17/21 11/17/21


 


Fluoride (Sodium) [Sodium Fluoride 1 applic DENTAL BID 11/17/21 11/17/21





5000 Plus]   


 


Insulin Regular, Human [Novolin R See Protocol SQ AC-TID 11/17/21 11/17/21





Flexpen]   


 


Meclizine [Antivert] 25 mg PO BID 11/17/21 11/17/21


 


Omega-3 Acid Ethyl Esters [Lovaza] 2 gm PO BID 11/17/21 11/17/21


 


Rosuvastatin [Crestor] 10 mg PO HS 11/17/21 11/17/21











                                    Allergies











Allergy/AdvReac Type Severity Reaction Status Date / Time


 


Iodinated Contrast Media Allergy  Rash/Hives Verified 11/17/21 12:08


 


pioglitazone [From Actos] Allergy  Rash/Hives Verified 11/17/21 12:08


 


pentazocine [From Talwin] AdvReac  Vomiting Verified 11/17/21 12:08














Review of Systems


ROS Statement: 


Those systems with pertinent positive or pertinent negative responses have been 

documented in the HPI.





ROS Other: All systems not noted in ROS Statement are negative.





Past Medical History


Past Medical History: CVA/TIA, Hypertension


History of Any Multi-Drug Resistant Organisms: None Reported


Past Surgical History: Appendectomy, Cholecystectomy, Hysterectomy


Additional Past Surgical History / Comment(s): D&C, liver transplant


Past Psychological History: No Psychological Hx Reported


Smoking Status: Never smoker


Past Alcohol Use History: None Reported


Past Drug Use History: None Reported





General Exam


Limitations: no limitations


General appearance: alert, in no apparent distress


Head exam: Present: atraumatic, normocephalic, normal inspection


Eye exam: Present: normal appearance, PERRL, EOMI.  Absent: scleral icterus, con

junctival injection, periorbital swelling


ENT exam: Present: normal exam, mucous membranes moist


Neck exam: Present: normal inspection.  Absent: tenderness, meningismus, 

lymphadenopathy


Respiratory exam: Present: decreased breath sounds.  Absent: normal lung sounds 

bilaterally, respiratory distress, wheezes, rales, rhonchi, stridor


Cardiovascular Exam: Present: normal rhythm, tachycardia, normal heart sounds.  

Absent: systolic murmur, diastolic murmur, rubs, gallop, clicks


GI/Abdominal exam: Present: soft, normal bowel sounds.  Absent: distended, 

tenderness, guarding, rebound, rigid





Course





                                   Vital Signs











  11/17/21 11/17/21 11/17/21





  10:58 11:17 12:02


 


Temperature 97.8 F  


 


Pulse Rate 89  80


 


Respiratory 20 22 18





Rate   


 


Blood Pressure 122/73  164/78


 


O2 Sat by Pulse 92 L  98





Oximetry   














  11/17/21





  13:00


 


Temperature 


 


Pulse Rate 86


 


Respiratory 34 H





Rate 


 


Blood Pressure 152/77


 


O2 Sat by Pulse 96





Oximetry 














EKG Findings





- EKG Comments:


EKG Findings:: EKG performed at length: 1154 sinus rhythm rate of 92 pr 154 qrs 

84 qt / qtc 358/442





Medical Decision Making





- Medical Decision Making


Patient tested positive for COVID-19 outpatient patient has evidence of pn

eum[place]  patient had hypoxia resting and with exertion dropped down to 83%.  

Patient will be admitted for COVID-19 pneumonia, hypoxia.





- Lab Data


Result diagrams: 


                                 11/17/21 11:46





                                 11/17/21 11:46





                                   Lab Results











  11/17/21 11/17/21 11/17/21 Range/Units





  11:46 11:46 11:46 


 


WBC  4.5    (3.8-10.6)  k/uL


 


RBC  3.94    (3.80-5.40)  m/uL


 


Hgb  10.5 L    (11.4-16.0)  gm/dL


 


Hct  31.0 L    (34.0-46.0)  %


 


MCV  78.5 L    (80.0-100.0)  fL


 


MCH  26.5    (25.0-35.0)  pg


 


MCHC  33.8    (31.0-37.0)  g/dL


 


RDW  15.4    (11.5-15.5)  %


 


Plt Count  261    (150-450)  k/uL


 


MPV  7.2    


 


Neutrophils %  85    %


 


Lymphocytes %  11    %


 


Monocytes %  3    %


 


Eosinophils %  0    %


 


Basophils %  0    %


 


Neutrophils #  3.8    (1.3-7.7)  k/uL


 


Lymphocytes #  0.5 L    (1.0-4.8)  k/uL


 


Monocytes #  0.1    (0-1.0)  k/uL


 


Eosinophils #  0.0    (0-0.7)  k/uL


 


Basophils #  0.0    (0-0.2)  k/uL


 


Poikilocytosis  Slight    


 


PT   11.0   (9.0-12.0)  sec


 


INR   1.0   (<1.2)  


 


APTT   25.7   (22.0-30.0)  sec


 


Sodium    135 L  (137-145)  mmol/L


 


Potassium    4.7  (3.5-5.1)  mmol/L


 


Chloride    103  ()  mmol/L


 


Carbon Dioxide    17 L  (22-30)  mmol/L


 


Anion Gap    15  mmol/L


 


BUN    18 H  (7-17)  mg/dL


 


Creatinine    0.71  (0.52-1.04)  mg/dL


 


Est GFR (CKD-EPI)AfAm    >90  (>60 ml/min/1.73 sqM)  


 


Est GFR (CKD-EPI)NonAf    84  (>60 ml/min/1.73 sqM)  


 


Glucose    183 H  (74-99)  mg/dL


 


Calcium    8.7  (8.4-10.2)  mg/dL


 


Magnesium    1.2 L  (1.6-2.3)  mg/dL


 


Total Bilirubin    1.0  (0.2-1.3)  mg/dL


 


AST    83 H  (14-36)  U/L


 


ALT    36 H  (4-34)  U/L


 


Alkaline Phosphatase    101  ()  U/L


 


Troponin I     (0.000-0.034)  ng/mL


 


NT-Pro-B Natriuret Pep     pg/mL


 


Total Protein    6.7  (6.3-8.2)  g/dL


 


Albumin    3.6  (3.5-5.0)  g/dL














  11/17/21 11/17/21 Range/Units





  11:46 11:46 


 


WBC    (3.8-10.6)  k/uL


 


RBC    (3.80-5.40)  m/uL


 


Hgb    (11.4-16.0)  gm/dL


 


Hct    (34.0-46.0)  %


 


MCV    (80.0-100.0)  fL


 


MCH    (25.0-35.0)  pg


 


MCHC    (31.0-37.0)  g/dL


 


RDW    (11.5-15.5)  %


 


Plt Count    (150-450)  k/uL


 


MPV    


 


Neutrophils %    %


 


Lymphocytes %    %


 


Monocytes %    %


 


Eosinophils %    %


 


Basophils %    %


 


Neutrophils #    (1.3-7.7)  k/uL


 


Lymphocytes #    (1.0-4.8)  k/uL


 


Monocytes #    (0-1.0)  k/uL


 


Eosinophils #    (0-0.7)  k/uL


 


Basophils #    (0-0.2)  k/uL


 


Poikilocytosis    


 


PT    (9.0-12.0)  sec


 


INR    (<1.2)  


 


APTT    (22.0-30.0)  sec


 


Sodium    (137-145)  mmol/L


 


Potassium    (3.5-5.1)  mmol/L


 


Chloride    ()  mmol/L


 


Carbon Dioxide    (22-30)  mmol/L


 


Anion Gap    mmol/L


 


BUN    (7-17)  mg/dL


 


Creatinine    (0.52-1.04)  mg/dL


 


Est GFR (CKD-EPI)AfAm    (>60 ml/min/1.73 sqM)  


 


Est GFR (CKD-EPI)NonAf    (>60 ml/min/1.73 sqM)  


 


Glucose    (74-99)  mg/dL


 


Calcium    (8.4-10.2)  mg/dL


 


Magnesium    (1.6-2.3)  mg/dL


 


Total Bilirubin    (0.2-1.3)  mg/dL


 


AST    (14-36)  U/L


 


ALT    (4-34)  U/L


 


Alkaline Phosphatase    ()  U/L


 


Troponin I  0.018   (0.000-0.034)  ng/mL


 


NT-Pro-B Natriuret Pep   1860  pg/mL


 


Total Protein    (6.3-8.2)  g/dL


 


Albumin    (3.5-5.0)  g/dL














Disposition


Clinical Impression: 


 Hypoxia, COVID-19





Disposition: ADMITTED AS IP TO THIS HOSP


Condition: Serious


Referrals: 


CALLI Evans III, MD [Primary Care Provider] - 1-2 days

## 2021-11-17 NOTE — XR
EXAMINATION TYPE: XR chest 2V

 

DATE OF EXAM: 11/17/2021

 

COMPARISON: 11/11/2020

 

HISTORY: 76 year-old female shortness of breath, difficulty breathing

 

TECHNIQUE:  PA and lateral views

 

FINDINGS:  

Heart normal size. Atherosclerotic arch calcifications. Interstitial prominence appears slightly more
 pronounced from prior exam. Asymmetric elevation right hemidiaphragm. No consolidation or pleural ef
fusion.

 

 

IMPRESSION:  

 

1. Interstitial prominence has increased. Consider bronchitis, interstitial pneumonitis, or atypical 
pneumonias.

2. Continued asymmetric elevation right hemidiaphragm. If concern for hemidiaphragmatic paralysis, a 
fluoroscopic sniff test could be performed.

## 2021-11-18 LAB
ALBUMIN SERPL-MCNC: 3.5 G/DL (ref 3.8–4.9)
ALBUMIN/GLOB SERPL: 1.34 G/DL (ref 1.6–3.17)
ALP SERPL-CCNC: 97 U/L (ref 41–126)
ALT SERPL-CCNC: 33 U/L (ref 8–44)
ANION GAP SERPL CALC-SCNC: 15.9 MMOL/L (ref 4–12)
AST SERPL-CCNC: 62 U/L (ref 13–35)
BASOPHILS # BLD AUTO: 0.02 X 10*3/UL (ref 0–0.1)
BASOPHILS NFR BLD AUTO: 0.6 %
BILIRUB INDIRECT SERPL-MCNC: 0.32 MG/DL (ref 0.2–1)
BUN SERPL-SCNC: 18.3 MG/DL (ref 9–27)
BUN/CREAT SERPL: 25.74 RATIO (ref 12–20)
CALCIUM SPEC-MCNC: 8.4 MG/DL (ref 8.7–10.3)
CHLORIDE SERPL-SCNC: 100 MMOL/L (ref 96–109)
CO2 SERPL-SCNC: 16.1 MMOL/L (ref 21.6–31.8)
EOSINOPHIL # BLD AUTO: 0 X 10*3/UL (ref 0.04–0.35)
EOSINOPHIL NFR BLD AUTO: 0 %
ERYTHROCYTE [DISTWIDTH] IN BLOOD BY AUTOMATED COUNT: 3.76 X 10*6/UL (ref 4.1–5.2)
ERYTHROCYTE [DISTWIDTH] IN BLOOD: 15.1 % (ref 11.5–14.5)
GLOBULIN SER CALC-MCNC: 2.6 G/DL (ref 1.6–3.3)
GLUCOSE BLD-MCNC: 174 MG/DL (ref 75–99)
GLUCOSE BLD-MCNC: 230 MG/DL (ref 75–99)
GLUCOSE BLD-MCNC: 306 MG/DL (ref 75–99)
GLUCOSE BLD-MCNC: 315 MG/DL (ref 75–99)
GLUCOSE SERPL-MCNC: 236 MG/DL (ref 70–110)
HCT VFR BLD AUTO: 30.2 % (ref 37.2–46.3)
HGB BLD-MCNC: 9.8 G/DL (ref 12–15)
LDH SPEC-CCNC: 358 U/L (ref 120–246)
LYMPHOCYTES # SPEC AUTO: 0.49 X 10*3/UL (ref 0.9–5)
LYMPHOCYTES NFR SPEC AUTO: 15.9 %
MCH RBC QN AUTO: 26.1 PG (ref 27–32)
MCHC RBC AUTO-ENTMCNC: 32.5 G/DL (ref 32–37)
MCV RBC AUTO: 80.3 FL (ref 80–97)
MONOCYTES # BLD AUTO: 0.22 X 10*3/UL (ref 0.2–1)
MONOCYTES NFR BLD AUTO: 7.1 %
NEUTROPHILS # BLD AUTO: 2.21 X 10*3/UL (ref 1.8–7.7)
NEUTROPHILS NFR BLD AUTO: 71.9 %
PLATELET # BLD AUTO: 240 X 10*3/UL (ref 140–440)
POTASSIUM SERPL-SCNC: 4.9 MMOL/L (ref 3.5–5.5)
PROT SERPL-MCNC: 6.1 G/DL (ref 6.2–8.2)
SODIUM SERPL-SCNC: 132 MMOL/L (ref 135–145)
WBC # BLD AUTO: 3.08 X 10*3/UL (ref 4.5–10)

## 2021-11-18 RX ADMIN — METOPROLOL TARTRATE SCH MG: 50 TABLET, FILM COATED ORAL at 21:32

## 2021-11-18 RX ADMIN — ASPIRIN SCH: 325 TABLET ORAL at 23:01

## 2021-11-18 RX ADMIN — Medication SCH EACH: at 09:53

## 2021-11-18 RX ADMIN — METOPROLOL TARTRATE SCH MG: 50 TABLET, FILM COATED ORAL at 09:51

## 2021-11-18 RX ADMIN — ENOXAPARIN SODIUM SCH MG: 40 INJECTION SUBCUTANEOUS at 09:50

## 2021-11-18 RX ADMIN — Medication SCH MG: at 09:51

## 2021-11-18 RX ADMIN — MYCOPHENOLIC ACID SCH MG: 180 TABLET, DELAYED RELEASE ORAL at 21:33

## 2021-11-18 RX ADMIN — Medication SCH MCG: at 09:52

## 2021-11-18 RX ADMIN — FAMOTIDINE SCH MG: 20 TABLET, FILM COATED ORAL at 09:52

## 2021-11-18 RX ADMIN — MECLIZINE HYDROCHLORIDE SCH MG: 25 TABLET ORAL at 09:52

## 2021-11-18 RX ADMIN — INSULIN ASPART SCH UNIT: 100 INJECTION, SOLUTION INTRAVENOUS; SUBCUTANEOUS at 17:30

## 2021-11-18 RX ADMIN — CLOPIDOGREL BISULFATE SCH MG: 75 TABLET ORAL at 09:52

## 2021-11-18 RX ADMIN — DEXTROSE SCH MG: 50 INJECTION, SOLUTION INTRAVENOUS at 10:19

## 2021-11-18 RX ADMIN — LEVOTHYROXINE SODIUM SCH MCG: 50 TABLET ORAL at 06:09

## 2021-11-18 RX ADMIN — FAMOTIDINE SCH MG: 20 TABLET, FILM COATED ORAL at 21:33

## 2021-11-18 RX ADMIN — MYCOPHENOLIC ACID SCH MG: 180 TABLET, DELAYED RELEASE ORAL at 09:51

## 2021-11-18 RX ADMIN — INSULIN ASPART SCH UNIT: 100 INJECTION, SOLUTION INTRAVENOUS; SUBCUTANEOUS at 21:34

## 2021-11-18 RX ADMIN — MECLIZINE HYDROCHLORIDE SCH MG: 25 TABLET ORAL at 21:33

## 2021-11-18 RX ADMIN — INSULIN ASPART SCH UNIT: 100 INJECTION, SOLUTION INTRAVENOUS; SUBCUTANEOUS at 12:01

## 2021-11-18 NOTE — P.CNPUL
History of Present Illness


Consult date: 11/18/21


Requesting physician: Orlin E Sheet


Reason for consult: dyspnea, cough, hypoxemia, pneumonia


Chief complaint: Dyspnea, cough, fever


History of present illness: 


 76-year-old white female patient with past medical history of a potential, 

previous history of CVA 8 years ago without residual deficits, liver 

transplantation for history of nonalcoholic steatohepatitis ([facility]) in [place],

lifetime nonsmoker and nondrinker, who presented to the emergency department on 

11/17/2021 with complaints of shortness of breath, fever, and cough.  Patient 

tested positive for COVID 19 one week ago at an urgent care clinic in [place].  She was started on antibiotics for pneumonia the form of doxycycline.  

However her symptom onset was 2 weeks ago and initially started with fatigue and

loss of appetite and progressed to shortness of breath, cough and fever.  

Patient is not vaccinated for COVID-19 related to patient's history of severe 

reaction to even oral contrast when she has to have a CT of the abdomen 

completed.  She is on 4 L of oxygen her pulse ox of 90-92%, her chest x-ray in 

emergency department shows interstitial prominence, asymmetric elevation of the 

right hemidiaphragm, no consolidation or pleural effusion.  Admission blood work

shows white blood cell, 4.5, hemoglobin of 10.5, platelet count is 261, 

lymphocyte count is 0.5, d-dimer is 1.2, INR is 1.0, sodium is 135, potassium is

4.7, CO2 17, BUN is 18, creatinine 0.71, magnesium is 1.2, AST is 83, ALT is 36,

alkaline phosphatase is 101, troponin is negative at 0.018, proBNP is 1860.  

Patient takes cyclosporine and mycophenilate for her history of liver 

transplant, she was started on Decadron 6 mg daily, she is on prophylactic dose 

Lovenox and COVID-19 vitamins.  She is breathing comfortably, has a dry 

nonproductive cough, she is afebrile.  She had a mild diarrhea and loss of 

appetite, but no vomiting or nausea.








Review of Systems


All systems: negative


Constitutional: Reports fatigue, Reports fever, Reports weakness, Denies chills


Eyes: denies blurred vision, denies pain


Ears, nose, mouth and throat: Denies headache, Denies sore throat


Cardiovascular: Denies chest pain, Denies shortness of breath


Respiratory: Reports cough, Reports dyspnea


Gastrointestinal: Reports diarrhea, Denies abdominal pain, Denies nausea, Denies

vomiting


Genitourinary: Denies dysuria, Denies hematuria


Musculoskeletal: Denies myalgias


Integumentary: Denies pruritus, Denies rash


Neurological: Denies numbness, Denies weakness


Psychiatric: Denies anxiety, Denies depression


Endocrine: Denies fatigue, Denies weight change





Past Medical History


Past Medical History: CVA/TIA, Hypertension


History of Any Multi-Drug Resistant Organisms: None Reported


Past Surgical History: Appendectomy, Cholecystectomy, Hysterectomy


Additional Past Surgical History / Comment(s): D&C, liver transplant


Past Anesthesia/Blood Transfusion Reactions: No Reported Reaction


Past Psychological History: No Psychological Hx Reported


Smoking Status: Never smoker


Past Alcohol Use History: None Reported


Past Drug Use History: None Reported





Medications and Allergies


                                Home Medications











 Medication  Instructions  Recorded  Confirmed  Type


 


Acetaminophen Tab [Tylenol] 1,000 mg PO BID PRN 11/11/20 11/17/21 History


 


Calcium Carbonate/Vitamin D3 1 tab PO DAILY 11/11/20 11/17/21 History





[Calcium 500-Vit D3 5 Mcg (200 Iu)]    


 


Cephalexin [Keflex] 250 mg PO DAILY 11/11/20 11/17/21 History


 


Clopidogrel [Plavix] 75 mg PO DAILY 11/11/20 11/17/21 History


 


Furosemide [Lasix] 20 mg PO DAILY PRN 11/11/20 11/17/21 History


 


Glucosamine/Chondr Coffey A Sod [Osteo 2 tab PO DAILY 11/11/20 11/17/21 History





Bi-Flex Caplet]    


 


Insulin Glargine [Lantus Vial] 40 unit SQ DAILY 11/11/20 11/17/21 History


 


Levothyroxine Sodium [Synthroid] 50 mcg PO DAILY 11/11/20 11/17/21 History


 


Lisinopril [Prinivil] 10 mg PO DAILY 11/11/20 11/17/21 History


 


Magnesium Oxide 400 mg PO DAILY 11/11/20 11/17/21 History


 


Metoprolol Tartrate [Lopressor] 50 mg PO BID 11/11/20 11/17/21 History


 


Multivitamins, Thera [Multivitamin 1 tab PO DAILY 11/11/20 11/17/21 History





(formulary)]    


 


Mycophenolate Sodium [Mycophenolic 360 mg PO BID 11/11/20 11/17/21 History





Acid]    


 


Pantoprazole Sodium [Protonix] 40 mg PO AC-BRKFST 11/11/20 11/17/21 History


 


Vit C/E/Zn/Coppr/Lutein/Zeaxan 1 cap PO DAILY 11/11/20 11/17/21 History





[Preservision Areds 2 Softgel]    


 


Zinc Gluconate [Zinc] 50 mg PO DAILY 11/11/20 11/17/21 History


 


cycloSPORINE [SandIMMUNE] 25 mg PO DAILY 11/11/20 11/17/21 History


 


cycloSPORINE [SandIMMUNE] 50 mg PO HS 11/11/20 11/17/21 History


 


sitaGLIPtin [Januvia] 100 mg PO DAILY 11/11/20 11/17/21 History


 


Amoxic-Pot Clav 875-125Mg 1 tab PO BID 11/17/21 11/17/21 History





[Augmentin 875-125]    


 


Amoxicillin 2,000 mg PO ONCE PRN 11/17/21 11/17/21 History


 


Famotidine [Pepcid] 20 mg PO BID 11/17/21 11/17/21 History


 


Fluoride (Sodium) [Sodium Fluoride 1 applic DENTAL BID 11/17/21 11/17/21 History





5000 Plus]    


 


Insulin Regular, Human [Novolin R See Protocol SQ AC-TID 11/17/21 11/17/21 

History





Flexpen]    


 


Meclizine [Antivert] 25 mg PO BID 11/17/21 11/17/21 History


 


Omega-3 Acid Ethyl Esters [Lovaza] 2 gm PO BID 11/17/21 11/17/21 History


 


Rosuvastatin [Crestor] 10 mg PO HS 11/17/21 11/17/21 History








                                    Allergies











Allergy/AdvReac Type Severity Reaction Status Date / Time


 


Iodinated Contrast Media Allergy  Rash/Hives Verified 11/17/21 12:08


 


pioglitazone [From Actos] Allergy  Rash/Hives Verified 11/17/21 12:08


 


pentazocine [From Talwin] AdvReac  Vomiting Verified 11/17/21 12:08














Physical Exam


Vitals: 


                                   Vital Signs











  Temp Pulse Pulse Resp BP BP Pulse Ox


 


 11/18/21 10:00  98.3 F   60  18   159/73  94 L


 


 11/18/21 05:21  98.0 F   74  14   136/60  90 L


 


 11/18/21 01:26  98.1 F   64  15   147/65  92 L


 


 11/17/21 22:24  97.6 F   72  15   141/76  92 L


 


 11/17/21 21:12   96   19  150/76   96


 


 11/17/21 19:32   77   19  151/80   96


 


 11/17/21 16:05  100.2 F H  89   36 H  165/84   97


 


 11/17/21 13:50   122 H   40 H    83 L


 


 11/17/21 13:45     30 H    92 L


 


 11/17/21 13:00   86   34 H  152/77   96








                                Intake and Output











 11/17/21 11/18/21 11/18/21





 22:59 06:59 14:59


 


Other:   


 


  # Voids  1 











 GENERAL EXAM: Alert, very pleasant, 76-year-old white female, on 4 L of oxygen 

with a pulse ox of 90-94% comfortable in no apparent distress.


HEAD: Normocephalic/atraumatic.


EYES: Normal reaction of pupils, equal size.  Conjunctiva pink, sclera white.


NOSE: Clear with pink turbinates.


THROAT: No erythema or exudates.


NECK: No masses, no JVD, no thyroid enlargement, no adenopathy.


CHEST: No chest wall deformity.  Symmetrical expansion. 


LUNGS: Equal air entry with mild bibasilar crackles


CVS: Regular rate and rhythm, normal S1 and S2, no gallops, no murmurs, no rubs


ABDOMEN: Soft, nontender.  No hepatosplenomegaly, normal bowel sounds, no 

guarding or rigidity.


EXTREMITIES: No clubbing, no edema, no cyanosis, 2+ pulses and upper and lower 

extremities.


MUSCULOSKELETAL: Muscle strength and tone normal.


SPINE: No scoliosis or deformity


SKIN: No rashes


CENTRAL NERVOUS SYSTEM: Alert and oriented -3.  No focal deficits, tone is 

normal in all 4 extremities.


PSYCHIATRIC: Alert and oriented -3.  Appropriate affect.  Intact judgment and 

insight.











Results





- Laboratory Findings


CBC and BMP: 


                                 11/18/21 05:57





                                 11/17/21 11:46


PT/INR, D-dimer











PT  11.0 sec (9.0-12.0)   11/17/21  11:46    


 


INR  1.0  (<1.2)   11/17/21  11:46    


 


D-Dimer  1.20 mg/L FEU (<0.60)  H  11/18/21  05:57    








Abnormal lab findings: 


                                  Abnormal Labs











  11/17/21 11/17/21 11/18/21





  11:46 11:46 05:57


 


WBC    3.08 L


 


RBC    3.76 L


 


Hgb  10.5 L   9.8 L


 


Hct  31.0 L   30.2 L


 


MCV  78.5 L  


 


MCH    26.1 L


 


RDW    15.1 H


 


MPV    9.3 L


 


Immature Gran #    0.14 H


 


Lymphocytes #  0.5 L   0.49 L


 


Eosinophils #    0 L


 


D-Dimer   


 


Sodium   135 L 


 


Carbon Dioxide   17 L 


 


BUN   18 H 


 


Glucose   183 H 


 


POC Glucose (mg/dL)   


 


Magnesium   1.2 L 


 


AST   83 H 


 


ALT   36 H 














  11/18/21 11/18/21 11/18/21





  05:57 07:12 11:40


 


WBC   


 


RBC   


 


Hgb   


 


Hct   


 


MCV   


 


MCH   


 


RDW   


 


MPV   


 


Immature Gran #   


 


Lymphocytes #   


 


Eosinophils #   


 


D-Dimer  1.20 H  


 


Sodium   


 


Carbon Dioxide   


 


BUN   


 


Glucose   


 


POC Glucose (mg/dL)   230 H  174 H


 


Magnesium   


 


AST   


 


ALT   














- Diagnostic Findings


Chest x-ray: report reviewed, image reviewed





Assessment and Plan


Plan: 


 Assessment:





#1.  Acute hypoxic respiratory failure related to COVID-19 pneumonia with onset 

of symptoms 2 weeks ago, patient is outside the window for Remdesivir, she has 

not vaccinated





#2.  History of liver transplant by history of nonalcoholic steatohepatitis, she

is on cyclosporine and Mycophenolate





#3.  Hypertension





#4.  Diabetes mellitus





#5.  Hyperlipidemia





#6.  Previous history of CVA with no residual neurologic deficits





#7.  Hypothyroidism





#8.  Lifetime nonsmoker





Plan:





Continue current medical treatment


Continue steroids


Continue Lovenox


Continue COVID-19 vitamins


We'll obtain d-dimer and inflammatory markers


We'll obtain lower extremity Dopplers to rule out possibility of DVT


Monitor for worsening dyspnea and hypoxia





I performed a history & physical examination of the patient and discussed their 

management with my nurse practitioner, Maxine Palomares.  I reviewed the nurse 

practitioner's note and agree with the documented findings and plan of care.  

Lung sounds are positive for diffuse crackles throughout the lung fields.  The 

findings and the impression was discussed with the patient.  I attest to the 

documentation by the nurse practitioner. 











Time with Patient: Greater than 30

## 2021-11-18 NOTE — P.PN
Subjective





This is a pleasant 76 results female with past medical history of 

hypothyroidism, liver transplant, CVA/TIA, hypertension, diabetes mellitus, 

hyperlipidemia


Patient presents because of difficulty breathing for about a week associated 

with cough and yellowish phlegm but no chest pain.


She had diarrhea like 3 times this morning.  She has diarrhea for 3 days.  No 

abdominal pain or vomiting.


She went to urgent care last Sunday about 3 days ago and they were diagnosed her

with pneumonia, next day her Covid test came back positive she denies history of

smoking, alcohol or illicit drugs.  She does not have history of COPD.  She is 

not on home oxygen





She is hypoxic at 83% on room air and tachypneic with a breathing rate 30-40, 

tachycardic 122.  She is afebrile at 97.8.


Labs unremarkable including CBC, INR, BMP.  Liver enzymes slightly elevated with

AST 83 and ALT 36.  Bilirubin is normal 1.0.


Chest x-ray: Bilateral infiltrate with interstitial prominence has increased





11/18/2021


Patient today is awake, she is slightly tachypneic while sitting in bed.  No 

significant dyspnea.  No significant coughing.  She denies chest pain.


She denies diarrhea.


She is saturating low 90s on 4 L oxygen via nasal cannula which is similar to 

yesterday.  Fever on admission 100.0.  Sided yesterday.


labs from today are still pending.


Sugar this morning to 30 patient is on insulin sliding scale


She remains on dexamethasone, and multiple vitamins.





Objective





- Vital Signs


Vital signs: 


                                   Vital Signs











Temp  98.3 F   11/18/21 10:00


 


Pulse  60   11/18/21 10:00


 


Resp  18   11/18/21 10:00


 


BP  159/73   11/18/21 10:00


 


Pulse Ox  94 L  11/18/21 10:00








                                 Intake & Output











 11/17/21 11/18/21 11/18/21





 18:59 06:59 18:59


 


Weight 64.864 kg  


 


Other:   


 


  # Voids  1 














- Exam





GENERAL: The patient is alert and oriented x3, not in any acute distress. Well 

developed, well nourished. 


HEENT: Pupils are round and equally reacting to light. EOMI. No scleral icterus.

No conjunctival pallor. Normocephalic, atraumatic. No pharyngeal erythema. No 

thyromegaly. 


CARDIOVASCULAR: S1 and S2 present. No murmurs, rubs, or gallops. 


PULMONARY: Chest is clear to auscultation, no wheezing or crackles. 


ABDOMEN: Soft, nontender, nondistended, normoactive bowel sounds. No palpable 

organomegaly. 


MUSCULOSKELETAL: No joint swelling or deformity. 


EXTREMITIES: No cyanosis, clubbing, or pedal edema. 


NEUROLOGICAL: Gross neurological examination did not reveal any focal deficits. 


SKIN: No rashes. No petechiae





- Labs


CBC & Chem 7: 


                                 11/17/21 11:46





                                 11/17/21 11:46


Labs: 


                  Abnormal Lab Results - Last 24 Hours (Table)











  11/17/21 11/17/21 11/18/21 Range/Units





  11:46 11:46 05:57 


 


Hgb  10.5 L    (11.4-16.0)  gm/dL


 


Hct  31.0 L    (34.0-46.0)  %


 


MCV  78.5 L    (80.0-100.0)  fL


 


Lymphocytes #  0.5 L    (1.0-4.8)  k/uL


 


D-Dimer    1.20 H  (<0.60)  mg/L FEU


 


Sodium   135 L   (137-145)  mmol/L


 


Carbon Dioxide   17 L   (22-30)  mmol/L


 


BUN   18 H   (7-17)  mg/dL


 


Glucose   183 H   (74-99)  mg/dL


 


POC Glucose (mg/dL)     (75-99)  mg/dL


 


Magnesium   1.2 L   (1.6-2.3)  mg/dL


 


AST   83 H   (14-36)  U/L


 


ALT   36 H   (4-34)  U/L














  11/18/21 Range/Units





  07:12 


 


Hgb   (11.4-16.0)  gm/dL


 


Hct   (34.0-46.0)  %


 


MCV   (80.0-100.0)  fL


 


Lymphocytes #   (1.0-4.8)  k/uL


 


D-Dimer   (<0.60)  mg/L FEU


 


Sodium   (137-145)  mmol/L


 


Carbon Dioxide   (22-30)  mmol/L


 


BUN   (7-17)  mg/dL


 


Glucose   (74-99)  mg/dL


 


POC Glucose (mg/dL)  230 H  (75-99)  mg/dL


 


Magnesium   (1.6-2.3)  mg/dL


 


AST   (14-36)  U/L


 


ALT   (4-34)  U/L














Assessment and Plan


Assessment: 








Bilateral acute covid pneumonia


Acute hypoxic respiratory failure


Possible viral gastroenteritis secondary to Covid with mildly elevated liver 

enzymes


Hypothyroidism


History of liver transplant


Hypertension


History of CVA/TIA


Hyperlipidemia


Diabetes mellitus, on insulin

















Plan: 





This is a pleasant 76 years old female who presents with covid pneumonia


Continue with dexamethasone.  And insulin sliding scale


Continue with vitamin C, D and zinc


Pulmonary consult


Labs and medication were reviewed..  Continue same treatment.  Continue with 

symptomatic treatment.  Resume home medication.  Monitor lytes and vitals.  DVT 

and GI prophylaxis.  Further recommendations depends on the clinical course of 

the patient


DVT prophylaxis: Subcutaneous Lovenox


GI Prophylaxis: Pepcid


Prognosis is guarded

## 2021-11-19 LAB
GLUCOSE BLD-MCNC: 151 MG/DL (ref 75–99)
GLUCOSE BLD-MCNC: 169 MG/DL (ref 75–99)
GLUCOSE BLD-MCNC: 224 MG/DL (ref 75–99)
GLUCOSE BLD-MCNC: 281 MG/DL (ref 75–99)

## 2021-11-19 RX ADMIN — FAMOTIDINE SCH MG: 20 TABLET, FILM COATED ORAL at 20:26

## 2021-11-19 RX ADMIN — Medication SCH MG: at 10:03

## 2021-11-19 RX ADMIN — ASPIRIN SCH: 325 TABLET ORAL at 20:35

## 2021-11-19 RX ADMIN — INSULIN ASPART SCH UNIT: 100 INJECTION, SOLUTION INTRAVENOUS; SUBCUTANEOUS at 12:03

## 2021-11-19 RX ADMIN — FAMOTIDINE SCH MG: 20 TABLET, FILM COATED ORAL at 10:03

## 2021-11-19 RX ADMIN — INSULIN DETEMIR SCH UNIT: 100 INJECTION, SOLUTION SUBCUTANEOUS at 10:05

## 2021-11-19 RX ADMIN — INSULIN ASPART SCH UNIT: 100 INJECTION, SOLUTION INTRAVENOUS; SUBCUTANEOUS at 20:27

## 2021-11-19 RX ADMIN — INSULIN ASPART SCH UNIT: 100 INJECTION, SOLUTION INTRAVENOUS; SUBCUTANEOUS at 17:31

## 2021-11-19 RX ADMIN — METOPROLOL TARTRATE SCH MG: 50 TABLET, FILM COATED ORAL at 20:27

## 2021-11-19 RX ADMIN — Medication SCH EACH: at 10:03

## 2021-11-19 RX ADMIN — Medication SCH MCG: at 10:03

## 2021-11-19 RX ADMIN — LEVOTHYROXINE SODIUM SCH MCG: 50 TABLET ORAL at 05:54

## 2021-11-19 RX ADMIN — MYCOPHENOLIC ACID SCH MG: 180 TABLET, DELAYED RELEASE ORAL at 20:26

## 2021-11-19 RX ADMIN — MYCOPHENOLIC ACID SCH MG: 180 TABLET, DELAYED RELEASE ORAL at 10:02

## 2021-11-19 RX ADMIN — INSULIN ASPART SCH UNIT: 100 INJECTION, SOLUTION INTRAVENOUS; SUBCUTANEOUS at 10:05

## 2021-11-19 RX ADMIN — MECLIZINE HYDROCHLORIDE SCH MG: 25 TABLET ORAL at 10:03

## 2021-11-19 RX ADMIN — CLOPIDOGREL BISULFATE SCH MG: 75 TABLET ORAL at 10:03

## 2021-11-19 RX ADMIN — ENOXAPARIN SODIUM SCH MG: 40 INJECTION SUBCUTANEOUS at 10:03

## 2021-11-19 RX ADMIN — DEXTROSE SCH MG: 50 INJECTION, SOLUTION INTRAVENOUS at 10:02

## 2021-11-19 RX ADMIN — MECLIZINE HYDROCHLORIDE SCH MG: 25 TABLET ORAL at 20:27

## 2021-11-19 RX ADMIN — METOPROLOL TARTRATE SCH MG: 50 TABLET, FILM COATED ORAL at 10:04

## 2021-11-19 NOTE — US
EXAMINATION TYPE: US venous doppler duplex LE BI

 

DATE OF EXAM: 11/19/2021 7:29 AM

 

COMPARISON: NONE

 

CLINICAL HISTORY: 76-year-old female elevated d-dimer. SOB, patient on blood thinners

 

SIDE PERFORMED: Bilateral  

 

TECHNIQUE:  The lower extremity deep venous system is examined utilizing real time linear array sonog
ole with graded compression, doppler sonography and color-flow sonography.

 

FINDINGS:

 

VESSELS IMAGED:

Common Femoral Vein

Deep Femoral Vein

Greater Saphenous Vein *

Femoral Vein

Popliteal Vein

Small Saphenous Vein *

Proximal Calf Veins

(* superficial vessels)

 

 

 

Right Leg:  Appears negative for DVT

 

Left Leg:  Appears negative for DVT

 

 

 

IMPRESSION:

No evidence for DVT within the bilateral lower extremities imaged from the groin to the upper calf.

## 2021-11-19 NOTE — P.PN
Subjective





This is a pleasant 76 results female with past medical history of 

hypothyroidism, liver transplant, CVA/TIA, hypertension, diabetes mellitus, 

hyperlipidemia


Patient presents because of difficulty breathing for about a week associated 

with cough and yellowish phlegm but no chest pain.


She had diarrhea like 3 times this morning.  She has diarrhea for 3 days.  No 

abdominal pain or vomiting.


She went to urgent care last Sunday about 3 days ago and they were diagnosed her

with pneumonia, next day her Covid test came back positive she denies history of

smoking, alcohol or illicit drugs.  She does not have history of COPD.  She is 

not on home oxygen





She is hypoxic at 83% on room air and tachypneic with a breathing rate 30-40, 

tachycardic 122.  She is afebrile at 97.8.


Labs unremarkable including CBC, INR, BMP.  Liver enzymes slightly elevated with

AST 83 and ALT 36.  Bilirubin is normal 1.0.


Chest x-ray: Bilateral infiltrate with interstitial prominence has increased





11/18/2021


Patient today is awake, she is slightly tachypneic while sitting in bed.  No 

significant dyspnea.  No significant coughing.  She denies chest pain.


She denies diarrhea.


She is saturating low 90s on 4 L oxygen via nasal cannula which is similar to 

yesterday.  Fever on admission 100.0.  Sided yesterday.


labs from today are still pending.


Sugar this morning to 30 patient is on insulin sliding scale


She remains on dexamethasone, and multiple vitamins.





11/19/2021


Patient breathing is stable and she is on 4 L/m of oxygen with good oxygen 

saturation.


Rest of vital signs stable, afebrile since admission when she had fever of 

100.2.  Labs are stable.


Leg ultrasound was negative.  She remains on dexamethasone vitamin C, D and 

zinc.


Possible discharge in 24-48 hours if she keeps improvement





Objective





- Vital Signs


Vital signs: 


                                   Vital Signs











Temp  98.5 F   11/19/21 17:46


 


Pulse  64   11/19/21 17:46


 


Resp  16   11/19/21 17:46


 


BP  132/72   11/19/21 17:46


 


Pulse Ox  95   11/19/21 18:06








                                 Intake & Output











 11/19/21 11/19/21 11/20/21





 06:59 18:59 06:59


 


Other:   


 


  # Voids  1 














- Exam





GENERAL: The patient is alert and oriented x3, not in any acute distress. Well 

developed, well nourished. 


HEENT: Pupils are round and equally reacting to light. EOMI. No scleral icterus.

No conjunctival pallor. Normocephalic, atraumatic. No pharyngeal erythema. No 

thyromegaly. 


CARDIOVASCULAR: S1 and S2 present. No murmurs, rubs, or gallops. 


PULMONARY: Chest is clear to auscultation, no wheezing or crackles. 


ABDOMEN: Soft, nontender, nondistended, normoactive bowel sounds. No palpable 

organomegaly. 


MUSCULOSKELETAL: No joint swelling or deformity. 


EXTREMITIES: No cyanosis, clubbing, or pedal edema. 


NEUROLOGICAL: Gross neurological examination did not reveal any focal deficits. 


SKIN: No rashes. No petechiae





- Labs


CBC & Chem 7: 


                                 11/18/21 05:57





                                 11/18/21 05:57


Labs: 


                  Abnormal Lab Results - Last 24 Hours (Table)











  11/18/21 11/19/21 11/19/21 Range/Units





  20:46 06:52 11:20 


 


POC Glucose (mg/dL)  306 H  151 H  169 H  (75-99)  mg/dL














  11/19/21 11/19/21 Range/Units





  16:27 20:14 


 


POC Glucose (mg/dL)  224 H  281 H  (75-99)  mg/dL














Assessment and Plan


Assessment: 








Bilateral acute covid pneumonia


Acute hypoxic respiratory failure


Possible viral gastroenteritis secondary to Covid with mildly elevated liver 

enzymes


Hypothyroidism


History of liver transplant


Hypertension


History of CVA/TIA


Hyperlipidemia


Diabetes mellitus, on insulin

















Plan: 





This is a pleasant 76 years old female who presents with covid pneumonia


Continue with dexamethasone.  And insulin sliding scale


Continue with vitamin C, D and zinc


Pulmonary consult


Labs and medication were reviewed..  Continue same treatment.  Continue with 

symptomatic treatment.  Resume home medication.  Monitor lytes and vitals.  DVT 

and GI prophylaxis.  Further recommendations depends on the clinical course of 

the patient


DVT prophylaxis: Subcutaneous Lovenox


GI Prophylaxis: Pepcid


Prognosis is guarded

## 2021-11-19 NOTE — P.PN
Subjective


Progress Note Date: 11/19/21


Principal diagnosis: 


  COVID 19





  76-year-old white female patient with past medical history of a potential, 

previous history of CVA 8 years ago without residual deficits, liver tr

ansplantation for history of nonalcoholic steatohepatitis ([place]) in [place], 

lifetime nonsmoker and nondrinker, who presented to the emergency department on 

11/17/2021 with complaints of shortness of breath, fever, and cough.  Patient 

tested positive for COVID 19 one week ago at an urgent care clinic in [place].  She was started on antibiotics for pneumonia the form of doxycycline.  

However her symptom onset was 2 weeks ago and initially started with fatigue and

loss of appetite and progressed to shortness of breath, cough and fever.  

Patient is not vaccinated for COVID-19 related to patient's history of severe 

reaction to even oral contrast when she has to have a CT of the abdomen comp

leted.  She is on 4 L of oxygen her pulse ox of 90-92%, her chest x-ray in 

emergency department shows interstitial prominence, asymmetric elevation of the 

right hemidiaphragm, no consolidation or pleural effusion.  Admission blood work

shows white blood cell, 4.5, hemoglobin of 10.5, platelet count is 261, 

lymphocyte count is 0.5, d-dimer is 1.2, INR is 1.0, sodium is 135, potassium is

4.7, CO2 17, BUN is 18, creatinine 0.71, magnesium is 1.2, AST is 83, ALT is 36,

alkaline phosphatase is 101, troponin is negative at 0.018, proBNP is 1860.  

Patient takes cyclosporine and mycophenilate for her history of liver 

transplant, she was started on Decadron 6 mg daily, she is on prophylactic dose 

Lovenox and COVID-19 vitamins.  She is breathing comfortably, has a dry 

nonproductive cough, she is afebrile.  She had a mild diarrhea and loss of 

appetite, but no vomiting or nausea.





On 11/19/2021 patient seen in follow-up on medical surgical floor, she is 

breathing comfortably, she is currently on 3 L of oxygen pulse ox is 90-95%, no 

acute events overnight, lower extremity Dopplers were negative for DVT in 

bilateral lower extremities, lung sounds reveal minimal crackles at the bases, 

no nausea vomiting no diarrhea, no acute events overnight, no fever or chills.











Objective





- Vital Signs


Vital signs: 


                                   Vital Signs











Temp  98.5 F   11/19/21 08:00


 


Pulse  73   11/19/21 08:00


 


Resp  18   11/19/21 08:00


 


BP  115/61   11/19/21 08:00


 


Pulse Ox  79 L  11/19/21 10:17














- Exam


 GENERAL EXAM: Alert, very pleasant, 76-year-old white female, on 4 L of oxygen 

with a pulse ox of 90-94% comfortable in no apparent distress.


HEAD: Normocephalic/atraumatic.


EYES: Normal reaction of pupils, equal size.  Conjunctiva pink, sclera white.


NOSE: Clear with pink turbinates.


THROAT: No erythema or exudates.


NECK: No masses, no JVD, no thyroid enlargement, no adenopathy.


CHEST: No chest wall deformity.  Symmetrical expansion. 


LUNGS: Equal air entry with mild bibasilar crackles


CVS: Regular rate and rhythm, normal S1 and S2, no gallops, no murmurs, no rubs


ABDOMEN: Soft, nontender.  No hepatosplenomegaly, normal bowel sounds, no 

guarding or rigidity.


EXTREMITIES: No clubbing, no edema, no cyanosis, 2+ pulses and upper and lower 

extremities.


MUSCULOSKELETAL: Muscle strength and tone normal.


SPINE: No scoliosis or deformity


SKIN: No rashes


CENTRAL NERVOUS SYSTEM: Alert and oriented -3.  No focal deficits, tone is 

normal in all 4 extremities.


PSYCHIATRIC: Alert and oriented -3.  Appropriate affect.  Intact judgment and 

insight.











- Labs


CBC & Chem 7: 


                                 11/18/21 05:57





                                 11/18/21 05:57


Labs: 


                  Abnormal Lab Results - Last 24 Hours (Table)











  11/18/21 11/18/21 11/18/21 Range/Units





  05:57 05:57 11:40 


 


WBC  3.08 L    (4.50-10.00)  X 10*3/uL


 


RBC  3.76 L    (4.10-5.20)  X 10*6/uL


 


Hgb  9.8 L    (12.0-15.0)  g/dL


 


Hct  30.2 L    (37.2-46.3)  %


 


MCH  26.1 L    (27.0-32.0)  pg


 


RDW  15.1 H    (11.5-14.5)  %


 


MPV  9.3 L    (9.5-12.2)  fL


 


Immature Gran #  0.14 H    (0.00-0.04)  X 10*3/uL


 


Lymphocytes #  0.49 L    (0.90-5.00)  X 10*3/uL


 


Eosinophils #  0 L    (0.04-0.35)  X 10*3/uL


 


Sodium   132 L   (135-145)  mmol/L


 


Carbon Dioxide   16.1 L   (21.6-31.8)  mmol/L


 


Anion Gap   15.90 H   (4.00-12.00)  mmol/L


 


BUN/Creatinine Ratio   25.74 H   (12.00-20.00)  Ratio


 


Glucose   236 H   ()  mg/dL


 


POC Glucose (mg/dL)    174 H  (75-99)  mg/dL


 


Calcium   8.4 L   (8.7-10.3)  mg/dL


 


AST   62 H   (13-35)  U/L


 


Lactate Dehydrogenase   358 H   (120-246)  U/L


 


C-Reactive Protein   7.10 H   (0.00-0.80)  mg/dL


 


Total Protein   6.1 L   (6.2-8.2)  g/dL


 


Albumin   3.5 L   (3.8-4.9)  g/dL


 


Albumin/Globulin Ratio   1.34 L   (1.60-3.17)  g/dL














  11/18/21 11/18/21 11/19/21 Range/Units





  17:25 20:46 06:52 


 


WBC     (4.50-10.00)  X 10*3/uL


 


RBC     (4.10-5.20)  X 10*6/uL


 


Hgb     (12.0-15.0)  g/dL


 


Hct     (37.2-46.3)  %


 


MCH     (27.0-32.0)  pg


 


RDW     (11.5-14.5)  %


 


MPV     (9.5-12.2)  fL


 


Immature Gran #     (0.00-0.04)  X 10*3/uL


 


Lymphocytes #     (0.90-5.00)  X 10*3/uL


 


Eosinophils #     (0.04-0.35)  X 10*3/uL


 


Sodium     (135-145)  mmol/L


 


Carbon Dioxide     (21.6-31.8)  mmol/L


 


Anion Gap     (4.00-12.00)  mmol/L


 


BUN/Creatinine Ratio     (12.00-20.00)  Ratio


 


Glucose     ()  mg/dL


 


POC Glucose (mg/dL)  315 H  306 H  151 H  (75-99)  mg/dL


 


Calcium     (8.7-10.3)  mg/dL


 


AST     (13-35)  U/L


 


Lactate Dehydrogenase     (120-246)  U/L


 


C-Reactive Protein     (0.00-0.80)  mg/dL


 


Total Protein     (6.2-8.2)  g/dL


 


Albumin     (3.8-4.9)  g/dL


 


Albumin/Globulin Ratio     (1.60-3.17)  g/dL














Assessment and Plan


Plan: 


 Assessment:





#1.  Acute hypoxic respiratory failure related to COVID-19 pneumonia with onset 

of symptoms 2 weeks ago, patient is outside the window for Remdesivir, she has 

not vaccinated





#2.  History of liver transplant by history of nonalcoholic steatohepatitis, she

is on cyclosporine and Mycophenolate





#3.  Hypertension





#4.  Diabetes mellitus





#5.  Hyperlipidemia





#6.  Previous history of CVA with no residual neurologic deficits





#7.  Hypothyroidism





#8.  Lifetime nonsmoker





Plan:





No worsening dyspnea or hypoxia


Vital signs have been stable overnight, patient breathing comfortably


Continue current medical treatment


Continue steroids


Continue Lovenox


Continue COVID-19 vitamins


If remains stable continues to improve may consider discharge home in the next 

24 hours





I performed a history & physical examination of the patient and discussed their 

management with my nurse practitioner, Maxine Palomares.  I reviewed the nurse 

practitioner's note and agree with the documented findings and plan of care.  

Lung sounds are positive for diffuse crackles throughout the lung fields.  The 

findings and the impression was discussed with the patient.  I attest to the 

documentation by the nurse practitioner. 











Time with Patient: Less than 30

## 2021-11-20 LAB
GLUCOSE BLD-MCNC: 108 MG/DL (ref 75–99)
GLUCOSE BLD-MCNC: 136 MG/DL (ref 75–99)
GLUCOSE BLD-MCNC: 249 MG/DL (ref 75–99)
GLUCOSE BLD-MCNC: 365 MG/DL (ref 75–99)

## 2021-11-20 RX ADMIN — METOPROLOL TARTRATE SCH MG: 50 TABLET, FILM COATED ORAL at 21:42

## 2021-11-20 RX ADMIN — MECLIZINE HYDROCHLORIDE SCH MG: 25 TABLET ORAL at 21:42

## 2021-11-20 RX ADMIN — MYCOPHENOLIC ACID SCH MG: 180 TABLET, DELAYED RELEASE ORAL at 21:43

## 2021-11-20 RX ADMIN — INSULIN ASPART SCH: 100 INJECTION, SOLUTION INTRAVENOUS; SUBCUTANEOUS at 07:40

## 2021-11-20 RX ADMIN — INSULIN ASPART SCH: 100 INJECTION, SOLUTION INTRAVENOUS; SUBCUTANEOUS at 11:48

## 2021-11-20 RX ADMIN — ENOXAPARIN SODIUM SCH MG: 40 INJECTION SUBCUTANEOUS at 08:50

## 2021-11-20 RX ADMIN — FAMOTIDINE SCH MG: 20 TABLET, FILM COATED ORAL at 08:51

## 2021-11-20 RX ADMIN — LEVOTHYROXINE SODIUM SCH MCG: 50 TABLET ORAL at 05:49

## 2021-11-20 RX ADMIN — Medication SCH EACH: at 08:51

## 2021-11-20 RX ADMIN — INSULIN ASPART SCH UNIT: 100 INJECTION, SOLUTION INTRAVENOUS; SUBCUTANEOUS at 17:24

## 2021-11-20 RX ADMIN — CLOPIDOGREL BISULFATE SCH MG: 75 TABLET ORAL at 08:51

## 2021-11-20 RX ADMIN — INSULIN ASPART SCH UNIT: 100 INJECTION, SOLUTION INTRAVENOUS; SUBCUTANEOUS at 21:43

## 2021-11-20 RX ADMIN — FAMOTIDINE SCH MG: 20 TABLET, FILM COATED ORAL at 21:42

## 2021-11-20 RX ADMIN — INSULIN DETEMIR SCH UNIT: 100 INJECTION, SOLUTION SUBCUTANEOUS at 08:51

## 2021-11-20 RX ADMIN — Medication SCH MCG: at 08:51

## 2021-11-20 RX ADMIN — METOPROLOL TARTRATE SCH MG: 50 TABLET, FILM COATED ORAL at 08:51

## 2021-11-20 RX ADMIN — Medication SCH MG: at 08:51

## 2021-11-20 RX ADMIN — MECLIZINE HYDROCHLORIDE SCH MG: 25 TABLET ORAL at 08:51

## 2021-11-20 RX ADMIN — DEXTROSE SCH MG: 50 INJECTION, SOLUTION INTRAVENOUS at 08:51

## 2021-11-20 RX ADMIN — MYCOPHENOLIC ACID SCH MG: 180 TABLET, DELAYED RELEASE ORAL at 08:50

## 2021-11-20 RX ADMIN — ASPIRIN SCH: 325 TABLET ORAL at 21:40

## 2021-11-20 NOTE — P.PN
Subjective





This is a pleasant 76 results female with past medical history of 

hypothyroidism, liver transplant, CVA/TIA, hypertension, diabetes mellitus, 

hyperlipidemia


Patient presents because of difficulty breathing for about a week associated 

with cough and yellowish phlegm but no chest pain.


She had diarrhea like 3 times this morning.  She has diarrhea for 3 days.  No 

abdominal pain or vomiting.


She went to urgent care last Sunday about 3 days ago and they were diagnosed her

with pneumonia, next day her Covid test came back positive she denies history of

smoking, alcohol or illicit drugs.  She does not have history of COPD.  She is 

not on home oxygen





She is hypoxic at 83% on room air and tachypneic with a breathing rate 30-40, 

tachycardic 122.  She is afebrile at 97.8.


Labs unremarkable including CBC, INR, BMP.  Liver enzymes slightly elevated with

AST 83 and ALT 36.  Bilirubin is normal 1.0.


Chest x-ray: Bilateral infiltrate with interstitial prominence has increased





11/18/2021


Patient today is awake, she is slightly tachypneic while sitting in bed.  No 

significant dyspnea.  No significant coughing.  She denies chest pain.


She denies diarrhea.


She is saturating low 90s on 4 L oxygen via nasal cannula which is similar to 

yesterday.  Fever on admission 100.0.  Sided yesterday.


labs from today are still pending.


Sugar this morning to 30 patient is on insulin sliding scale


She remains on dexamethasone, and multiple vitamins.





11/19/2021


Patient breathing is stable and she is on 4 L/m of oxygen with good oxygen 

saturation.


Rest of vital signs stable, afebrile since admission when she had fever of 

100.2.  Labs are stable.


Leg ultrasound was negative.  She remains on dexamethasone vitamin C, D and 

zinc.


Possible discharge in 24-48 hours if she keeps improvement





11/20/2021


Patient today was L [place] tired although she says she looks the same.  Her 

oxygen requirement went up to 6 L/m so we have to keep monitor the patient in 

the hospital


Blood pressure is slightly elevated while she is on steroids.  No more fevers 

since admission.  


She remains on dexamethasone and multiple vitamins and Lovenox





Objective





- Vital Signs


Vital signs: 


                                   Vital Signs











Temp  98.3 F   11/20/21 14:00


 


Pulse  62   11/20/21 14:00


 


Resp  22   11/20/21 14:00


 


BP  132/67   11/20/21 14:00


 


Pulse Ox  90 L  11/20/21 14:00








                                 Intake & Output











 11/19/21 11/20/21 11/20/21





 18:59 06:59 18:59


 


Other:   


 


  # Voids 1 2 














- Exam





GENERAL: The patient is alert and oriented x3, not in any acute distress. Well 

developed, well nourished. 


HEENT: Pupils are round and equally reacting to light. EOMI. No scleral icterus.

No conjunctival pallor. Normocephalic, atraumatic. No pharyngeal erythema. No 

thyromegaly. 


CARDIOVASCULAR: S1 and S2 present. No murmurs, rubs, or gallops. 


PULMONARY: Chest is clear to auscultation, no wheezing or crackles. 


ABDOMEN: Soft, nontender, nondistended, normoactive bowel sounds. No palpable 

organomegaly. 


MUSCULOSKELETAL: No joint swelling or deformity. 


EXTREMITIES: No cyanosis, clubbing, or pedal edema. 


NEUROLOGICAL: Gross neurological examination did not reveal any focal deficits. 


SKIN: No rashes. No petechiae





- Labs


CBC & Chem 7: 


                                 11/18/21 05:57





                                 11/18/21 05:57


Labs: 


                  Abnormal Lab Results - Last 24 Hours (Table)











  11/19/21 11/19/21 11/20/21 Range/Units





  16:27 20:14 07:00 


 


POC Glucose (mg/dL)  224 H  281 H  108 H  (75-99)  mg/dL














  11/20/21 Range/Units





  11:44 


 


POC Glucose (mg/dL)  136 H  (75-99)  mg/dL














Assessment and Plan


Assessment: 








Bilateral acute covid pneumonia


Acute hypoxic respiratory failure


Possible viral gastroenteritis secondary to Covid with mildly elevated liver en

zymes


Hypothyroidism


History of liver transplant


Hypertension


History of CVA/TIA


Hyperlipidemia


Diabetes mellitus, on insulin

















Plan: 





This is a pleasant 76 years old female who presents with covid pneumonia


Continue with dexamethasone.  And insulin sliding scale


Continue with vitamin C, D and zinc


Pulmonary consult


Labs and medication were reviewed..  Continue same treatment.  Continue with 

symptomatic treatment.  Resume home medication.  Monitor lytes and vitals.  DVT 

and GI prophylaxis.  Further recommendations depends on the clinical course of 

the patient


DVT prophylaxis: Subcutaneous Lovenox


GI Prophylaxis: Pepcid


Prognosis is guarded

## 2021-11-20 NOTE — P.PN
Subjective


Progress Note Date: 11/20/21








 76-year-old white female patient with past medical history of a potential, 

previous history of CVA 8 years ago without residual deficits, liver 

transplantation for history of nonalcoholic steatohepatitis (HUTCHINSON) in [place],

lifetime nonsmoker and nondrinker, who presented to the emergency department on 

11/17/2021 with complaints of shortness of breath, fever, and cough.  Patient 

tested positive for COVID 19 one week ago at an urgent care clinic in [place].  She was started on antibiotics for pneumonia the form of doxycycline.  

However her symptom onset was 2 weeks ago and initially started with fatigue and

loss of appetite and progressed to shortness of breath, cough and fever.  

Patient is not vaccinated for COVID-19 related to patient's history of severe 

reaction to even oral contrast when she has to have a CT of the abdomen 

completed.  She is on 4 L of oxygen her pulse ox of 90-92%, her chest x-ray in 

emergency department shows interstitial prominence, asymmetric elevation of the 

right hemidiaphragm, no consolidation or pleural effusion.  Admission blood work

shows white blood cell, 4.5, hemoglobin of 10.5, platelet count is 261, 

lymphocyte count is 0.5, d-dimer is 1.2, INR is 1.0, sodium is 135, potassium is

4.7, CO2 17, BUN is 18, creatinine 0.71, magnesium is 1.2, AST is 83, ALT is 36,

alkaline phosphatase is 101, troponin is negative at 0.018, proBNP is 1860.  

Patient takes cyclosporine and mycophenilate for her history of liver 

transplant, she was started on Decadron 6 mg daily, she is on prophylactic dose 

Lovenox and COVID-19 vitamins.  She is breathing comfortably, has a dry 

nonproductive cough, she is afebrile.  She had a mild diarrhea and loss of 

appetite, but no vomiting or nausea.





On 11/19/2021 patient seen in follow-up on medical surgical floor, she is br

eathing comfortably, she is currently on 3 L of oxygen pulse ox is 90-95%, no 

acute events overnight, lower extremity Dopplers were negative for DVT in 

bilateral lower extremities, lung sounds reveal minimal crackles at the bases, 

no nausea vomiting no diarrhea, no acute events overnight, no fever or chills.





The patient is seen today 11/20/2021 in follow-up on the regular medical floor. 

She is currently sitting up in chair at the bedside.  Awake and alert in no 

acute distress.  She has had increasing shortness of breath.  She was 87% O2 

saturations on 5 L nasal cannula.  She's currently 95% on 6 L nasal cannula.  

She's been afebrile.  Hemodynamically stable.  Blood glucose 136.  She remains 

on Decadron, Lovenox, vitamin supplements.





Objective





- Vital Signs


Vital signs: 


                                   Vital Signs











Temp  98.4 F   11/20/21 10:46


 


Pulse  59 L  11/20/21 10:46


 


Resp  20   11/20/21 10:46


 


BP  164/73   11/20/21 10:46


 


Pulse Ox  95   11/20/21 10:46








                                 Intake & Output











 11/19/21 11/20/21 11/20/21





 18:59 06:59 18:59


 


Other:   


 


  # Voids 1 2 














- Exam





GENERAL EXAM: Alert, very pleasant 76-year-old female patient, on 6 L nasal 

cannula, fairly comfortable in no apparent distress.


HEAD: Normocephalic.


EYES: Normal reaction of pupils, equal size.


NOSE: Clear with pink turbinates.


THROAT: No erythema or exudates.


NECK: No masses, no JVD.


CHEST: No chest wall deformity.


LUNGS: Equal air entry with coarse crackles in the bilateral bases.


CVS: S1 and S2 normal with no audible murmur, regular rhythm.


ABDOMEN: No hepatosplenomegaly, normal bowel sounds, no guarding or rigidity.


SPINE: No scoliosis or deformity


SKIN: No rashes


CENTRAL NERVOUS SYSTEM: No focal deficits, tone is normal in all 4 extremities.


EXTREMITIES: There is no peripheral edema.  No clubbing, no cyanosis.  

Peripheral pulses are intact.





- Labs


CBC & Chem 7: 


                                 11/18/21 05:57





                                 11/18/21 05:57


Labs: 


                  Abnormal Lab Results - Last 24 Hours (Table)











  11/19/21 11/19/21 11/20/21 Range/Units





  16:27 20:14 07:00 


 


POC Glucose (mg/dL)  224 H  281 H  108 H  (75-99)  mg/dL














  11/20/21 Range/Units





  11:44 


 


POC Glucose (mg/dL)  136 H  (75-99)  mg/dL














Assessment and Plan


Assessment: 





1  Acute hypoxic respiratory failure related to COVID-19 pneumonia with onset of

symptoms 2 weeks ago, patient is outside the window for Remdesivir, she has not 

been vaccinated





2  History of liver transplant by history of nonalcoholic steatohepatitis, she 

is on cyclosporine and Mycophenolate





3  Hypertension





4  Diabetes mellitus





5  Hyperlipidemia





6  Previous history of CVA with no residual neurologic deficits





7  Hypothyroidism





8  Lifetime nonsmoker





Plan:





The patient was seen and evaluated by Dr. Johnson


Currently on 6 L nasal cannula


Titrate the FiO2 as tolerated


Chest x-ray and labs in a.m.


We'll continue to follow





I, the cosigning physician, performed a history & physical examination of the 

patient. Lungs sounds crackles in the posterior bases.  Maintaining good O2 

saturations in the 90s on 6 L/m per nasal cannula.  I discussed the assessment 

and plan of care with my nurse practitioner, Lydia Kapadia. I attest to the above 

note as dictated by her.t

## 2021-11-21 LAB
ALBUMIN SERPL-MCNC: 3.7 G/DL (ref 3.8–4.9)
ALBUMIN/GLOB SERPL: 1.29 G/DL (ref 1.6–3.17)
ALP SERPL-CCNC: 98 U/L (ref 41–126)
ALT SERPL-CCNC: 81 U/L (ref 8–44)
ANION GAP SERPL CALC-SCNC: 18.6 MMOL/L (ref 4–12)
AST SERPL-CCNC: 88 U/L (ref 13–35)
BASOPHILS # BLD AUTO: 0.03 X 10*3/UL (ref 0–0.1)
BASOPHILS NFR BLD AUTO: 0.3 %
BUN SERPL-SCNC: 28.7 MG/DL (ref 9–27)
BUN/CREAT SERPL: 33.88 RATIO (ref 12–20)
CALCIUM SPEC-MCNC: 8.8 MG/DL (ref 8.7–10.3)
CHLORIDE SERPL-SCNC: 95 MMOL/L (ref 96–109)
CO2 SERPL-SCNC: 16.9 MMOL/L (ref 21.6–31.8)
EOSINOPHIL # BLD AUTO: 0 X 10*3/UL (ref 0.04–0.35)
EOSINOPHIL NFR BLD AUTO: 0 %
ERYTHROCYTE [DISTWIDTH] IN BLOOD BY AUTOMATED COUNT: 3.91 X 10*6/UL (ref 4.1–5.2)
ERYTHROCYTE [DISTWIDTH] IN BLOOD: 14.8 % (ref 11.5–14.5)
GLOBULIN SER CALC-MCNC: 2.8 G/DL (ref 1.6–3.3)
GLUCOSE BLD-MCNC: 184 MG/DL (ref 75–99)
GLUCOSE BLD-MCNC: 188 MG/DL (ref 75–99)
GLUCOSE BLD-MCNC: 310 MG/DL (ref 75–99)
GLUCOSE BLD-MCNC: 91 MG/DL (ref 75–99)
GLUCOSE SERPL-MCNC: 79 MG/DL (ref 70–110)
HCT VFR BLD AUTO: 30.8 % (ref 37.2–46.3)
HGB BLD-MCNC: 10.3 G/DL (ref 12–15)
LDH SPEC-CCNC: 343 U/L (ref 120–246)
LYMPHOCYTES # SPEC AUTO: 1.29 X 10*3/UL (ref 0.9–5)
LYMPHOCYTES NFR SPEC AUTO: 11 %
MCH RBC QN AUTO: 26.3 PG (ref 27–32)
MCHC RBC AUTO-ENTMCNC: 33.4 G/DL (ref 32–37)
MCV RBC AUTO: 78.8 FL (ref 80–97)
MONOCYTES # BLD AUTO: 0.86 X 10*3/UL (ref 0.2–1)
MONOCYTES NFR BLD AUTO: 7.3 %
NEUTROPHILS # BLD AUTO: 9.35 X 10*3/UL (ref 1.8–7.7)
NEUTROPHILS NFR BLD AUTO: 79.4 %
PLATELET # BLD AUTO: 464 X 10*3/UL (ref 140–440)
POTASSIUM SERPL-SCNC: 4.2 MMOL/L (ref 3.5–5.5)
PROT SERPL-MCNC: 6.5 G/DL (ref 6.2–8.2)
SODIUM SERPL-SCNC: 130 MMOL/L (ref 135–145)
WBC # BLD AUTO: 11.76 X 10*3/UL (ref 4.5–10)

## 2021-11-21 RX ADMIN — MECLIZINE HYDROCHLORIDE SCH MG: 25 TABLET ORAL at 21:19

## 2021-11-21 RX ADMIN — FAMOTIDINE SCH MG: 20 TABLET, FILM COATED ORAL at 08:07

## 2021-11-21 RX ADMIN — METOPROLOL TARTRATE SCH MG: 50 TABLET, FILM COATED ORAL at 21:18

## 2021-11-21 RX ADMIN — FAMOTIDINE SCH MG: 20 TABLET, FILM COATED ORAL at 21:19

## 2021-11-21 RX ADMIN — DEXTROSE SCH MG: 50 INJECTION, SOLUTION INTRAVENOUS at 08:06

## 2021-11-21 RX ADMIN — INSULIN ASPART SCH UNIT: 100 INJECTION, SOLUTION INTRAVENOUS; SUBCUTANEOUS at 12:31

## 2021-11-21 RX ADMIN — METOPROLOL TARTRATE SCH MG: 50 TABLET, FILM COATED ORAL at 08:07

## 2021-11-21 RX ADMIN — LEVOTHYROXINE SODIUM SCH MCG: 50 TABLET ORAL at 05:53

## 2021-11-21 RX ADMIN — Medication SCH MCG: at 08:07

## 2021-11-21 RX ADMIN — INSULIN ASPART SCH: 100 INJECTION, SOLUTION INTRAVENOUS; SUBCUTANEOUS at 08:05

## 2021-11-21 RX ADMIN — INSULIN DETEMIR SCH UNIT: 100 INJECTION, SOLUTION SUBCUTANEOUS at 08:05

## 2021-11-21 RX ADMIN — ENOXAPARIN SODIUM SCH MG: 40 INJECTION SUBCUTANEOUS at 08:05

## 2021-11-21 RX ADMIN — CLOPIDOGREL BISULFATE SCH MG: 75 TABLET ORAL at 08:06

## 2021-11-21 RX ADMIN — Medication SCH MG: at 08:07

## 2021-11-21 RX ADMIN — Medication SCH EACH: at 08:06

## 2021-11-21 RX ADMIN — MYCOPHENOLIC ACID SCH MG: 180 TABLET, DELAYED RELEASE ORAL at 21:19

## 2021-11-21 RX ADMIN — MECLIZINE HYDROCHLORIDE SCH MG: 25 TABLET ORAL at 08:06

## 2021-11-21 RX ADMIN — INSULIN ASPART SCH UNIT: 100 INJECTION, SOLUTION INTRAVENOUS; SUBCUTANEOUS at 21:19

## 2021-11-21 RX ADMIN — INSULIN ASPART SCH UNIT: 100 INJECTION, SOLUTION INTRAVENOUS; SUBCUTANEOUS at 17:24

## 2021-11-21 RX ADMIN — MYCOPHENOLIC ACID SCH MG: 180 TABLET, DELAYED RELEASE ORAL at 08:06

## 2021-11-21 RX ADMIN — ASPIRIN SCH: 325 TABLET ORAL at 21:19

## 2021-11-21 NOTE — P.PN
Subjective





This is a pleasant 76 results female with past medical history of 

hypothyroidism, liver transplant, CVA/TIA, hypertension, diabetes mellitus, 

hyperlipidemia


Patient presents because of difficulty breathing for about a week associated 

with cough and yellowish phlegm but no chest pain.


She had diarrhea like 3 times this morning.  She has diarrhea for 3 days.  No 

abdominal pain or vomiting.


She went to urgent care last Sunday about 3 days ago and they were diagnosed her

with pneumonia, next day her Covid test came back positive she denies history of

smoking, alcohol or illicit drugs.  She does not have history of COPD.  She is 

not on home oxygen





She is hypoxic at 83% on room air and tachypneic with a breathing rate 30-40, 

tachycardic 122.  She is afebrile at 97.8.


Labs unremarkable including CBC, INR, BMP.  Liver enzymes slightly elevated with

AST 83 and ALT 36.  Bilirubin is normal 1.0.


Chest x-ray: Bilateral infiltrate with interstitial prominence has increased





11/18/2021


Patient today is awake, she is slightly tachypneic while sitting in bed.  No 

significant dyspnea.  No significant coughing.  She denies chest pain.


She denies diarrhea.


She is saturating low 90s on 4 L oxygen via nasal cannula which is similar to 

yesterday.  Fever on admission 100.0.  Sided yesterday.


labs from today are still pending.


Sugar this morning to 30 patient is on insulin sliding scale


She remains on dexamethasone, and multiple vitamins.





11/19/2021


Patient breathing is stable and she is on 4 L/m of oxygen with good oxygen 

saturation.


Rest of vital signs stable, afebrile since admission when she had fever of 

100.2.  Labs are stable.


Leg ultrasound was negative.  She remains on dexamethasone vitamin C, D and 

zinc.


Possible discharge in 24-48 hours if she keeps improvement





11/20/2021


Patient today was L Delgadillo tired although she says she looks the same.  Her 

oxygen requirement went up to 6 L/m so we have to keep monitor the patient in 

the hospital


Blood pressure is slightly elevated while she is on steroids.  No more fevers 

since admission.  


She remains on dexamethasone and multiple vitamins and Lovenox





11/21/2021


Patient looks more tired, her breathing looks the same with dyspnea however her 

oxygen requirements increased to 8 L/m.


She has mild lab abnormality like mild increased liver enzymes, mild 

leukocytosis, mild hyponatremia.


Chest x-ray was showing slightly increased infiltrates


She remains on dexamethasone, Lovenox and multiple vitamins.


D-dimer is a stable 1.2-1.6





Objective





- Vital Signs


Vital signs: 


                                   Vital Signs











Temp  98.2 F   11/21/21 10:12


 


Pulse  69   11/21/21 10:12


 


Resp  18   11/21/21 10:12


 


BP  121/72   11/21/21 10:12


 


Pulse Ox  90 L  11/21/21 10:12








                                 Intake & Output











 11/20/21 11/21/21 11/21/21





 18:59 06:59 18:59


 


Other:   


 


  # Voids 4 3 


 


  # Bowel Movements 2  














- Exam





GENERAL: The patient is alert and oriented x3, not in any acute distress. Well 

developed, well nourished. 


HEENT: Pupils are round and equally reacting to light. EOMI. No scleral icterus.

No conjunctival pallor. Normocephalic, atraumatic. No pharyngeal erythema. No 

thyromegaly. 


CARDIOVASCULAR: S1 and S2 present. No murmurs, rubs, or gallops. 


PULMONARY: Chest is clear to auscultation, no wheezing or crackles. 


ABDOMEN: Soft, nontender, nondistended, normoactive bowel sounds. No palpable 

organomegaly. 


MUSCULOSKELETAL: No joint swelling or deformity. 


EXTREMITIES: No cyanosis, clubbing, or pedal edema. 


NEUROLOGICAL: Gross neurological examination did not reveal any focal deficits. 


SKIN: No rashes. No petechiae





- Labs


CBC & Chem 7: 


                                 11/21/21 06:00





                                 11/21/21 06:00


Labs: 


                  Abnormal Lab Results - Last 24 Hours (Table)











  11/20/21 11/20/21 11/21/21 Range/Units





  16:54 20:29 06:00 


 


WBC    11.76 H  (4.50-10.00)  X 10*3/uL


 


RBC    3.91 L  (4.10-5.20)  X 10*6/uL


 


Hgb    10.3 L  (12.0-15.0)  g/dL


 


Hct    30.8 L  (37.2-46.3)  %


 


MCV    78.8 L  (80.0-97.0)  fL


 


MCH    26.3 L  (27.0-32.0)  pg


 


RDW    14.8 H  (11.5-14.5)  %


 


Plt Count    464 H  (140-440)  X 10*3/uL


 


MPV    9.1 L  (9.5-12.2)  fL


 


Immature Gran #    0.23 H  (0.00-0.04)  X 10*3/uL


 


Neutrophils #    9.35 H  (1.80-7.70)  X 10*3/uL


 


Eosinophils #    0 L  (0.04-0.35)  X 10*3/uL


 


D-Dimer     (<0.60)  mg/L FEU


 


POC Glucose (mg/dL)  365 H  249 H   (75-99)  mg/dL


 


Lactate Dehydrogenase     (120-246)  U/L














  11/21/21 11/21/21 11/21/21 Range/Units





  06:00 06:00 11:49 


 


WBC     (4.50-10.00)  X 10*3/uL


 


RBC     (4.10-5.20)  X 10*6/uL


 


Hgb     (12.0-15.0)  g/dL


 


Hct     (37.2-46.3)  %


 


MCV     (80.0-97.0)  fL


 


MCH     (27.0-32.0)  pg


 


RDW     (11.5-14.5)  %


 


Plt Count     (140-440)  X 10*3/uL


 


MPV     (9.5-12.2)  fL


 


Immature Gran #     (0.00-0.04)  X 10*3/uL


 


Neutrophils #     (1.80-7.70)  X 10*3/uL


 


Eosinophils #     (0.04-0.35)  X 10*3/uL


 


D-Dimer  1.62 H    (<0.60)  mg/L FEU


 


POC Glucose (mg/dL)    188 H  (75-99)  mg/dL


 


Lactate Dehydrogenase   343 H   (120-246)  U/L














Assessment and Plan


Assessment: 








Bilateral acute covid pneumonia


Acute hypoxic respiratory failure


Possible viral gastroenteritis secondary to Covid with mildly elevated liver 

enzymes


Hypothyroidism


History of liver transplant


Hypertension


History of CVA/TIA


Hyperlipidemia


Diabetes mellitus, on insulin

















Plan: 





This is a pleasant 76 years old female who presents with covid pneumonia


Continue with dexamethasone.  And insulin sliding scale


Continue with vitamin C, D and zinc


Pulmonary consult


Labs and medication were reviewed..  Continue same treatment.  Continue with 

symptomatic treatment.  Resume home medication.  Monitor lytes and vitals.  DVT 

and GI prophylaxis.  Further recommendations depends on the clinical course of 

the patient


DVT prophylaxis: Subcutaneous Lovenox


GI Prophylaxis: Pepcid


Prognosis is guarded

## 2021-11-21 NOTE — P.PN
Subjective


Progress Note Date: 11/21/21


Principal diagnosis: 





COVID-19 pneumonia








 76-year-old white female patient with past medical history of a potential, 

previous history of CVA 8 years ago without residual deficits, liver 

transplantation for history of nonalcoholic steatohepatitis (H[place]CHINSON) in [place],

lifetime nonsmoker and nondrinker, who presented to the emergency department on 

11/17/2021 with complaints of shortness of breath, fever, and cough.  Patient 

tested positive for COVID 19 one week ago at an urgent care clinic in [place].  She was started on antibiotics for pneumonia the form of doxycycline.  

However her symptom onset was 2 weeks ago and initially started with fatigue and

loss of appetite and progressed to shortness of breath, cough and fever.  

Patient is not vaccinated for COVID-19 related to patient's history of severe 

reaction to even oral contrast when she has to have a CT of the abdomen 

completed.  She is on 4 L of oxygen her pulse ox of 90-92%, her chest x-ray in 

emergency department shows interstitial prominence, asymmetric elevation of the 

right hemidiaphragm, no consolidation or pleural effusion.  Admission blood work

shows white blood cell, 4.5, hemoglobin of 10.5, platelet count is 261, 

lymphocyte count is 0.5, d-dimer is 1.2, INR is 1.0, sodium is 135, potassium is

4.7, CO2 17, BUN is 18, creatinine 0.71, magnesium is 1.2, AST is 83, ALT is 36,

alkaline phosphatase is 101, troponin is negative at 0.018, proBNP is 1860.  

Patient takes cyclosporine and mycophenilate for her history of liver 

transplant, she was started on Decadron 6 mg daily, she is on prophylactic dose 

Lovenox and COVID-19 vitamins.  She is breathing comfortably, has a dry 

nonproductive cough, she is afebrile.  She had a mild diarrhea and loss of 

appetite, but no vomiting or nausea.





On 11/19/2021 patient seen in follow-up on medical surgical floor, she is 

breathing comfortably, she is currently on 3 L of oxygen pulse ox is 90-95%, no 

acute events overnight, lower extremity Dopplers were negative for DVT in 

bilateral lower extremities, lung sounds reveal minimal crackles at the bases, 

no nausea vomiting no diarrhea, no acute events overnight, no fever or chills.





The patient is seen today 11/20/2021 in follow-up on the regular medical floor. 

She is currently sitting up in chair at the bedside.  Awake and alert in no 

acute distress.  She has had increasing shortness of breath.  She was 87% O2 

saturations on 5 L nasal cannula.  She's currently 95% on 6 L nasal cannula.  

She's been afebrile.  Hemodynamically stable.  Blood glucose 136.  She remains 

on Decadron, Lovenox, vitamin supplements.





The patient is seen today 11/21/2021 in follow-up on the regular medical floor. 

She is currently sitting up at the bedside.  Awake and alert in no acute 

distress.  Doing a bit better today compared to yesterday.  Her appetite is 

good.  She is on 8 L high flow nasal cannula maintaining O2 saturations in the 

low 90s.  She's been afebrile.  Hemodynamically stable.  Continues with coarse 

crackles in the posterior bases.  Chest x-ray, but continues to revealed 

bilateral left greater than right interstitial and patchy opacities.  White 

count 11.7.  Hemoglobin 10.3.  Lymphocytes 1.29.  D-dimer 1.62.  Sodium 1:30.  

Potassium 4.2.  Creatinine 0.8.  AST 88.  ALT 81.  .  C-reactive protein 

2.2.  She remains on Decadron, Lovenox, vitamin supplements.





Objective





- Vital Signs


Vital signs: 


                                   Vital Signs











Temp  98.0 F   11/21/21 14:00


 


Pulse  70   11/21/21 14:00


 


Resp  17   11/21/21 14:00


 


BP  119/70   11/21/21 14:00


 


Pulse Ox  90 L  11/21/21 14:00








                                 Intake & Output











 11/20/21 11/21/21 11/21/21





 18:59 06:59 18:59


 


Other:   


 


  # Voids 4 3 


 


  # Bowel Movements 2  














- Exam





GENERAL EXAM: Alert, very pleasant 76-year-old female patient, on 8 L nasal 

cannula, fairly comfortable in no apparent distress.


HEAD: Normocephalic.


EYES: Normal reaction of pupils, equal size.


NOSE: Clear with pink turbinates.


THROAT: No erythema or exudates.


NECK: No masses, no JVD.


CHEST: No chest wall deformity.


LUNGS: Equal air entry with coarse crackles in the bilateral bases.


CVS: S1 and S2 normal with no audible murmur, regular rhythm.


ABDOMEN: No hepatosplenomegaly, normal bowel sounds, no guarding or rigidity.


SPINE: No scoliosis or deformity


SKIN: No rashes


CENTRAL NERVOUS SYSTEM: No focal deficits, tone is normal in all 4 extremities.


EXTREMITIES: There is no peripheral edema.  No clubbing, no cyanosis.  

Peripheral pulses are intact.





- Labs


CBC & Chem 7: 


                                 11/21/21 06:00





                                 11/21/21 06:00


Labs: 


                  Abnormal Lab Results - Last 24 Hours (Table)











  11/20/21 11/20/21 11/21/21 Range/Units





  16:54 20:29 06:00 


 


WBC    11.76 H  (4.50-10.00)  X 10*3/uL


 


RBC    3.91 L  (4.10-5.20)  X 10*6/uL


 


Hgb    10.3 L  (12.0-15.0)  g/dL


 


Hct    30.8 L  (37.2-46.3)  %


 


MCV    78.8 L  (80.0-97.0)  fL


 


MCH    26.3 L  (27.0-32.0)  pg


 


RDW    14.8 H  (11.5-14.5)  %


 


Plt Count    464 H  (140-440)  X 10*3/uL


 


MPV    9.1 L  (9.5-12.2)  fL


 


Immature Gran #    0.23 H  (0.00-0.04)  X 10*3/uL


 


Neutrophils #    9.35 H  (1.80-7.70)  X 10*3/uL


 


Eosinophils #    0 L  (0.04-0.35)  X 10*3/uL


 


D-Dimer     (<0.60)  mg/L FEU


 


Sodium     (135-145)  mmol/L


 


Chloride     ()  mmol/L


 


Carbon Dioxide     (21.6-31.8)  mmol/L


 


Anion Gap     (4.00-12.00)  mmol/L


 


BUN     (9.0-27.0)  mg/dL


 


BUN/Creatinine Ratio     (12.00-20.00)  Ratio


 


POC Glucose (mg/dL)  365 H  249 H   (75-99)  mg/dL


 


AST     (13-35)  U/L


 


ALT     (8-44)  U/L


 


Lactate Dehydrogenase     (120-246)  U/L


 


C-Reactive Protein     (0.00-0.80)  mg/dL


 


Albumin     (3.8-4.9)  g/dL


 


Albumin/Globulin Ratio     (1.60-3.17)  g/dL














  11/21/21 11/21/21 11/21/21 Range/Units





  06:00 06:00 11:49 


 


WBC     (4.50-10.00)  X 10*3/uL


 


RBC     (4.10-5.20)  X 10*6/uL


 


Hgb     (12.0-15.0)  g/dL


 


Hct     (37.2-46.3)  %


 


MCV     (80.0-97.0)  fL


 


MCH     (27.0-32.0)  pg


 


RDW     (11.5-14.5)  %


 


Plt Count     (140-440)  X 10*3/uL


 


MPV     (9.5-12.2)  fL


 


Immature Gran #     (0.00-0.04)  X 10*3/uL


 


Neutrophils #     (1.80-7.70)  X 10*3/uL


 


Eosinophils #     (0.04-0.35)  X 10*3/uL


 


D-Dimer  1.62 H    (<0.60)  mg/L FEU


 


Sodium   130 L   (135-145)  mmol/L


 


Chloride   95 L   ()  mmol/L


 


Carbon Dioxide   16.9 L   (21.6-31.8)  mmol/L


 


Anion Gap   18.60 H   (4.00-12.00)  mmol/L


 


BUN   28.7 H   (9.0-27.0)  mg/dL


 


BUN/Creatinine Ratio   33.88 H   (12.00-20.00)  Ratio


 


POC Glucose (mg/dL)    188 H  (75-99)  mg/dL


 


AST   88 H   (13-35)  U/L


 


ALT   81 H   (8-44)  U/L


 


Lactate Dehydrogenase   343 H   (120-246)  U/L


 


C-Reactive Protein   2.20 H   (0.00-0.80)  mg/dL


 


Albumin   3.7 L   (3.8-4.9)  g/dL


 


Albumin/Globulin Ratio   1.29 L   (1.60-3.17)  g/dL














Assessment and Plan


Assessment: 





1  Acute hypoxic respiratory failure related to COVID-19 pneumonia with onset of

symptoms 2 weeks ago, patient is outside the window for Remdesivir, she has not 

been vaccinated





2  History of liver transplant by history of nonalcoholic steatohepatitis, she 

is on cyclosporine and Mycophenolate





3  Hypertension





4  Diabetes mellitus





5  Hyperlipidemia





6  Previous history of CVA with no residual neurologic deficits





7  Hypothyroidism





8  Lifetime nonsmoker





Plan:





The patient was seen and evaluated by Dr. Johnson


Chest x-ray and labs reviewed


Currently on 8 L nasal cannula


Titrate the FiO2 as tolerated


Increase her activity as tolerated


We'll continue to follow





I, the cosigning physician, performed a history & physical examination of the 

patient. Lungs sounds crackles in the posterior bases.  Maintaining good O2 

saturations in the 90s on 8 L/m per nasal cannula.  I discussed the assessment 

and plan of care with my nurse practitioner, Lydia Kapadia. I attest to the above n

ote as dictated by her.t

## 2021-11-21 NOTE — XR
EXAMINATION TYPE: XR chest 1V portable

 

DATE OF EXAM: 11/21/2021

 

COMPARISON: 11/17/2021

 

HISTORY: 76 years Female.

STUDY INDICATION GIVEN: CoVID pneumonia . 

 

TECHNIQUE: AP chest radiograph

 

IMPRESSION: 

 

Bilateral left greater than right interstitial and patchy opacities slightly increased in the interva
l.

 

No pneumothorax or large pleural effusion. Stable right hemidiaphragm eventration.

 

Stable cardiomediastinal silhouette.

 

Stable appearing osseous structures.

## 2021-11-22 LAB
GLUCOSE BLD-MCNC: 106 MG/DL (ref 75–99)
GLUCOSE BLD-MCNC: 194 MG/DL (ref 75–99)
GLUCOSE BLD-MCNC: 318 MG/DL (ref 75–99)
GLUCOSE BLD-MCNC: 360 MG/DL (ref 75–99)

## 2021-11-22 RX ADMIN — INSULIN ASPART SCH UNIT: 100 INJECTION, SOLUTION INTRAVENOUS; SUBCUTANEOUS at 21:24

## 2021-11-22 RX ADMIN — Medication SCH MG: at 10:49

## 2021-11-22 RX ADMIN — INSULIN ASPART SCH: 100 INJECTION, SOLUTION INTRAVENOUS; SUBCUTANEOUS at 10:50

## 2021-11-22 RX ADMIN — FAMOTIDINE SCH MG: 20 TABLET, FILM COATED ORAL at 10:49

## 2021-11-22 RX ADMIN — INSULIN ASPART SCH UNIT: 100 INJECTION, SOLUTION INTRAVENOUS; SUBCUTANEOUS at 12:58

## 2021-11-22 RX ADMIN — DEXTROSE SCH MG: 50 INJECTION, SOLUTION INTRAVENOUS at 10:48

## 2021-11-22 RX ADMIN — FAMOTIDINE SCH MG: 20 TABLET, FILM COATED ORAL at 21:24

## 2021-11-22 RX ADMIN — CLOPIDOGREL BISULFATE SCH MG: 75 TABLET ORAL at 10:49

## 2021-11-22 RX ADMIN — MECLIZINE HYDROCHLORIDE SCH MG: 25 TABLET ORAL at 21:24

## 2021-11-22 RX ADMIN — LEVOTHYROXINE SODIUM SCH MCG: 50 TABLET ORAL at 05:30

## 2021-11-22 RX ADMIN — MYCOPHENOLIC ACID SCH MG: 180 TABLET, DELAYED RELEASE ORAL at 10:49

## 2021-11-22 RX ADMIN — ENOXAPARIN SODIUM SCH MG: 40 INJECTION SUBCUTANEOUS at 10:48

## 2021-11-22 RX ADMIN — ACETAMINOPHEN PRN MG: 500 TABLET ORAL at 10:48

## 2021-11-22 RX ADMIN — METOPROLOL TARTRATE SCH MG: 50 TABLET, FILM COATED ORAL at 10:49

## 2021-11-22 RX ADMIN — MYCOPHENOLIC ACID SCH MG: 180 TABLET, DELAYED RELEASE ORAL at 21:25

## 2021-11-22 RX ADMIN — INSULIN ASPART SCH UNIT: 100 INJECTION, SOLUTION INTRAVENOUS; SUBCUTANEOUS at 17:50

## 2021-11-22 RX ADMIN — METOPROLOL TARTRATE SCH MG: 50 TABLET, FILM COATED ORAL at 21:24

## 2021-11-22 RX ADMIN — MECLIZINE HYDROCHLORIDE SCH MG: 25 TABLET ORAL at 10:49

## 2021-11-22 RX ADMIN — ASPIRIN SCH: 325 TABLET ORAL at 21:33

## 2021-11-22 RX ADMIN — Medication SCH MCG: at 10:48

## 2021-11-22 RX ADMIN — INSULIN DETEMIR SCH: 100 INJECTION, SOLUTION SUBCUTANEOUS at 10:50

## 2021-11-22 RX ADMIN — ACETAMINOPHEN PRN MG: 500 TABLET ORAL at 21:00

## 2021-11-22 RX ADMIN — Medication SCH EACH: at 10:49

## 2021-11-22 NOTE — P.CNNES
History of Present Illness


Consult date: 11/22/21


Requesting physician: Maxine Palomares


Reason for Consult: altered mental status


History of Present Illness: 


This is a 76-year-old woman with medical history of stroke without any residual 

deficit (about 8 years ago), diabetes mellitus, liver transplant for non-

alcoholic steatoheaptitis ([facility]), hypertension, hyperlipidemia, hypothyroidism, 

vertigo status post tympanostomy  who presented emergency department on 11/17/ 2021 for shortness of breath fever and cough.  Patient was tested for covert 19 

a week ago at an urgent clinic in [place].  Neurologist consulted for altered

mental status.  Per the patient's the nurse he stated that the patient the 

mentation seems the normal him but he stated that she keeps on taking off her 

oxygen.  During this hospital stay the patient's oxygen was as low as a 80% on 

11/20/2021.  Today it's 97% and she is on 8 L of nasal cannula.  Patient denies 

of any focal deficit, headaches, any new visual disturbance.  Just complaining 

of cough.





Some other workup in the hospital consisted.


Most recent vitals his blood pressure of 119/56, heart rate of the 70, 

temperature of 98.4 Fahrenheit oral, respiratory of 17, pulse ox of 97% at room 

air.


Most recent white blood cell is 11.762 thousand and on initial presentation the 

patient white blood cell was 4.5


Hemoglobin is 7.3.


Patient will see her glucose has been in the range of 100-310 in the last 24 

hours.


Sodium is 1:30, creatinine is 0.8, calcium is 8.8, AST of 88 and ALT of 81.


Magnesium is 1.2


Most recent chest x-ray was reported as bilateral left greater than the right 

interstitial and patchy opacity slightly increased in the interval.


Patient is supposed of lower extremities is negative for DVTs.








Review of Systems


Review of system:  The 12 point system was reviewed and apparent positive and 

negative per HPI.








Past Medical History


Past Medical History: CVA/TIA, Hypertension


History of Any Multi-Drug Resistant Organisms: None Reported


Past Surgical History: Appendectomy, Cholecystectomy, Hysterectomy


Additional Past Surgical History / Comment(s): D&C, liver transplant


Past Anesthesia/Blood Transfusion Reactions: No Reported Reaction


Past Psychological History: No Psychological Hx Reported


Smoking Status: Never smoker


Past Alcohol Use History: None Reported


Past Drug Use History: None Reported





Medications and Allergies


                                Home Medications











 Medication  Instructions  Recorded  Confirmed  Type


 


Acetaminophen Tab [Tylenol] 1,000 mg PO BID PRN 11/11/20 11/17/21 History


 


Calcium Carbonate/Vitamin D3 1 tab PO DAILY 11/11/20 11/17/21 History





[Calcium 500-Vit D3 5 Mcg (200 Iu)]    


 


Cephalexin [Keflex] 250 mg PO DAILY 11/11/20 11/17/21 History


 


Clopidogrel [Plavix] 75 mg PO DAILY 11/11/20 11/17/21 History


 


Furosemide [Lasix] 20 mg PO DAILY PRN 11/11/20 11/17/21 History


 


Glucosamine/Chondr Coffey A Sod [Osteo 2 tab PO DAILY 11/11/20 11/17/21 History





Bi-Flex Caplet]    


 


Insulin Glargine [Lantus Vial] 40 unit SQ DAILY 11/11/20 11/17/21 History


 


Levothyroxine Sodium [Synthroid] 50 mcg PO DAILY 11/11/20 11/17/21 History


 


Lisinopril [Prinivil] 10 mg PO DAILY 11/11/20 11/17/21 History


 


Magnesium Oxide 400 mg PO DAILY 11/11/20 11/17/21 History


 


Metoprolol Tartrate [Lopressor] 50 mg PO BID 11/11/20 11/17/21 History


 


Multivitamins, Thera [Multivitamin 1 tab PO DAILY 11/11/20 11/17/21 History





(formulary)]    


 


Mycophenolate Sodium [Mycophenolic 360 mg PO BID 11/11/20 11/17/21 History





Acid]    


 


Pantoprazole Sodium [Protonix] 40 mg PO AC-BRKFST 11/11/20 11/17/21 History


 


Vit C/E/Zn/Coppr/Lutein/Zeaxan 1 cap PO DAILY 11/11/20 11/17/21 History





[Preservision Areds 2 Softgel]    


 


Zinc Gluconate [Zinc] 50 mg PO DAILY 11/11/20 11/17/21 History


 


cycloSPORINE [SandIMMUNE] 25 mg PO DAILY 11/11/20 11/17/21 History


 


cycloSPORINE [SandIMMUNE] 50 mg PO HS 11/11/20 11/17/21 History


 


sitaGLIPtin [Januvia] 100 mg PO DAILY 11/11/20 11/17/21 History


 


Amoxic-Pot Clav 875-125Mg 1 tab PO BID 11/17/21 11/17/21 History





[Augmentin 875-125]    


 


Amoxicillin 2,000 mg PO ONCE PRN 11/17/21 11/17/21 History


 


Famotidine [Pepcid] 20 mg PO BID 11/17/21 11/17/21 History


 


Fluoride (Sodium) [Sodium Fluoride 1 applic DENTAL BID 11/17/21 11/17/21 History





5000 Plus]    


 


Insulin Regular, Human [Novolin R See Protocol SQ AC-TID 11/17/21 11/17/21 

History





Flexpen]    


 


Meclizine [Antivert] 25 mg PO BID 11/17/21 11/17/21 History


 


Omega-3 Acid Ethyl Esters [Lovaza] 2 gm PO BID 11/17/21 11/17/21 History


 


Rosuvastatin [Crestor] 10 mg PO HS 11/17/21 11/17/21 History








                                    Allergies











Allergy/AdvReac Type Severity Reaction Status Date / Time


 


Iodinated Contrast Media Allergy  Rash/Hives Verified 11/17/21 12:08


 


pioglitazone [From Actos] Allergy  Rash/Hives Verified 11/17/21 12:08


 


pentazocine [From Talwin] AdvReac  Vomiting Verified 11/17/21 12:08














Physical Examination





- Vital Signs


Vital Signs: 


                                   Vital Signs











  Temp Pulse Resp BP Pulse Ox


 


 11/22/21 10:00  98.4 F  70  17  119/56  97


 


 11/22/21 06:19  98.1 F  65  19  150/62  96


 


 11/22/21 01:45  97.8 F  53 L  19  171/73  98


 


 11/21/21 20:58  97.4 F L  58 L  17  152/77  94 L


 


 11/21/21 20:00   70  17  








                                Intake and Output











 11/21/21 11/22/21 11/22/21





 22:59 06:59 14:59


 


Other:   


 


  # Voids 3 4 


 


  # Bowel Movements 2  











GENERAL: The patient is lying in bed and is not in acute distress.


CHEST: The heart rate is regular rate rhythm.   No murmurs to auscultation.  No 

carotid bruit bilaterally.


LUNG: Clear to auscultation bilaterally no wheezing noted throughout.  Not 

labored breathing.


ABDOMEN/GI: Bowel sounds present in all 4 quadrants. No tenderness to palpation 

throughout.





NEUROLOGICAL:


Higher mental function: The patient is awake, alert, oriented to self and place.

 With options she correctly chose the right month but said the year is 19 jodie

ething.  Is able to identify objects (pen, foset, telephone).  Patient is 

following simple commands.  No aphasia and no neglect.


Cranial nerves: The pupils are round, equal and reactive to light and a

ccommodation.  Visual fields are full to confrontation throughout.  Extraocular 

movement is intact no nystagmus is noted.  Facial sensation is normal to touch 

throughout.  The facial strength is normal throughout.  Hearing is moderately 

decreased bilaterally to hand rub.  Tongue is midline and moved side-to-side 

without any difficulty.  No dysarthria is noted.  Patient had frequent episodes 

of cough. Shoulder shrug is normal bilaterally.


Motor: The strength is 5 over 5 throughout.  Normal tone and bulk.  


Cerebellum: Normal finger to nose bilaterally.


Sensation: Sensation is normal to touch throughout.


Plantars are mute bilaterally. 








Results





- Laboratory Findings


CBC and BMP: 


                                 11/21/21 06:00





                                 11/21/21 06:00


Abnormal Lab Findings: 


                                  Abnormal Labs











  11/17/21 11/17/21 11/18/21





  11:46 11:46 05:57


 


WBC    3.08 L


 


RBC    3.76 L


 


Hgb  10.5 L   9.8 L


 


Hct  31.0 L   30.2 L


 


MCV  78.5 L  


 


MCH    26.1 L


 


RDW    15.1 H


 


Plt Count   


 


MPV    9.3 L


 


Immature Gran #    0.14 H


 


Neutrophils #   


 


Lymphocytes #  0.5 L   0.49 L


 


Eosinophils #    0 L


 


D-Dimer   


 


Sodium   135 L 


 


Chloride   


 


Carbon Dioxide   17 L 


 


Anion Gap   


 


BUN   18 H 


 


BUN/Creatinine Ratio   


 


Glucose   183 H 


 


POC Glucose (mg/dL)   


 


Calcium   


 


Magnesium   1.2 L 


 


AST   83 H 


 


ALT   36 H 


 


Lactate Dehydrogenase   


 


C-Reactive Protein   


 


Total Protein   


 


Albumin   


 


Albumin/Globulin Ratio   














  11/18/21 11/18/21 11/18/21





  05:57 05:57 07:12


 


WBC   


 


RBC   


 


Hgb   


 


Hct   


 


MCV   


 


MCH   


 


RDW   


 


Plt Count   


 


MPV   


 


Immature Gran #   


 


Neutrophils #   


 


Lymphocytes #   


 


Eosinophils #   


 


D-Dimer   1.20 H 


 


Sodium  132 L  


 


Chloride   


 


Carbon Dioxide  16.1 L  


 


Anion Gap  15.90 H  


 


BUN   


 


BUN/Creatinine Ratio  25.74 H  


 


Glucose  236 H  


 


POC Glucose (mg/dL)    230 H


 


Calcium  8.4 L  


 


Magnesium   


 


AST  62 H  


 


ALT   


 


Lactate Dehydrogenase  358 H  


 


C-Reactive Protein  7.10 H  


 


Total Protein  6.1 L  


 


Albumin  3.5 L  


 


Albumin/Globulin Ratio  1.34 L  














  11/18/21 11/18/21 11/18/21





  11:40 17:25 20:46


 


WBC   


 


RBC   


 


Hgb   


 


Hct   


 


MCV   


 


MCH   


 


RDW   


 


Plt Count   


 


MPV   


 


Immature Gran #   


 


Neutrophils #   


 


Lymphocytes #   


 


Eosinophils #   


 


D-Dimer   


 


Sodium   


 


Chloride   


 


Carbon Dioxide   


 


Anion Gap   


 


BUN   


 


BUN/Creatinine Ratio   


 


Glucose   


 


POC Glucose (mg/dL)  174 H  315 H  306 H


 


Calcium   


 


Magnesium   


 


AST   


 


ALT   


 


Lactate Dehydrogenase   


 


C-Reactive Protein   


 


Total Protein   


 


Albumin   


 


Albumin/Globulin Ratio   














  11/19/21 11/19/21 11/19/21





  06:52 11:20 16:27


 


WBC   


 


RBC   


 


Hgb   


 


Hct   


 


MCV   


 


MCH   


 


RDW   


 


Plt Count   


 


MPV   


 


Immature Gran #   


 


Neutrophils #   


 


Lymphocytes #   


 


Eosinophils #   


 


D-Dimer   


 


Sodium   


 


Chloride   


 


Carbon Dioxide   


 


Anion Gap   


 


BUN   


 


BUN/Creatinine Ratio   


 


Glucose   


 


POC Glucose (mg/dL)  151 H  169 H  224 H


 


Calcium   


 


Magnesium   


 


AST   


 


ALT   


 


Lactate Dehydrogenase   


 


C-Reactive Protein   


 


Total Protein   


 


Albumin   


 


Albumin/Globulin Ratio   














  11/19/21 11/20/21 11/20/21





  20:14 07:00 11:44


 


WBC   


 


RBC   


 


Hgb   


 


Hct   


 


MCV   


 


MCH   


 


RDW   


 


Plt Count   


 


MPV   


 


Immature Gran #   


 


Neutrophils #   


 


Lymphocytes #   


 


Eosinophils #   


 


D-Dimer   


 


Sodium   


 


Chloride   


 


Carbon Dioxide   


 


Anion Gap   


 


BUN   


 


BUN/Creatinine Ratio   


 


Glucose   


 


POC Glucose (mg/dL)  281 H  108 H  136 H


 


Calcium   


 


Magnesium   


 


AST   


 


ALT   


 


Lactate Dehydrogenase   


 


C-Reactive Protein   


 


Total Protein   


 


Albumin   


 


Albumin/Globulin Ratio   














  11/20/21 11/20/21 11/21/21





  16:54 20:29 06:00


 


WBC    11.76 H


 


RBC    3.91 L


 


Hgb    10.3 L


 


Hct    30.8 L


 


MCV    78.8 L


 


MCH    26.3 L


 


RDW    14.8 H


 


Plt Count    464 H


 


MPV    9.1 L


 


Immature Gran #    0.23 H


 


Neutrophils #    9.35 H


 


Lymphocytes #   


 


Eosinophils #    0 L


 


D-Dimer   


 


Sodium   


 


Chloride   


 


Carbon Dioxide   


 


Anion Gap   


 


BUN   


 


BUN/Creatinine Ratio   


 


Glucose   


 


POC Glucose (mg/dL)  365 H  249 H 


 


Calcium   


 


Magnesium   


 


AST   


 


ALT   


 


Lactate Dehydrogenase   


 


C-Reactive Protein   


 


Total Protein   


 


Albumin   


 


Albumin/Globulin Ratio   














  11/21/21 11/21/21 11/21/21





  06:00 06:00 11:49


 


WBC   


 


RBC   


 


Hgb   


 


Hct   


 


MCV   


 


MCH   


 


RDW   


 


Plt Count   


 


MPV   


 


Immature Gran #   


 


Neutrophils #   


 


Lymphocytes #   


 


Eosinophils #   


 


D-Dimer  1.62 H  


 


Sodium   130 L 


 


Chloride   95 L 


 


Carbon Dioxide   16.9 L 


 


Anion Gap   18.60 H 


 


BUN   28.7 H 


 


BUN/Creatinine Ratio   33.88 H 


 


Glucose   


 


POC Glucose (mg/dL)    188 H


 


Calcium   


 


Magnesium   


 


AST   88 H 


 


ALT   81 H 


 


Lactate Dehydrogenase   343 H 


 


C-Reactive Protein   2.20 H 


 


Total Protein   


 


Albumin   3.7 L 


 


Albumin/Globulin Ratio   1.29 L 














  11/21/21 11/21/21 11/22/21





  17:02 20:58 07:32


 


WBC   


 


RBC   


 


Hgb   


 


Hct   


 


MCV   


 


MCH   


 


RDW   


 


Plt Count   


 


MPV   


 


Immature Gran #   


 


Neutrophils #   


 


Lymphocytes #   


 


Eosinophils #   


 


D-Dimer   


 


Sodium   


 


Chloride   


 


Carbon Dioxide   


 


Anion Gap   


 


BUN   


 


BUN/Creatinine Ratio   


 


Glucose   


 


POC Glucose (mg/dL)  310 H  184 H  106 H


 


Calcium   


 


Magnesium   


 


AST   


 


ALT   


 


Lactate Dehydrogenase   


 


C-Reactive Protein   


 


Total Protein   


 


Albumin   


 


Albumin/Globulin Ratio   














  11/22/21





  11:20


 


WBC 


 


RBC 


 


Hgb 


 


Hct 


 


MCV 


 


MCH 


 


RDW 


 


Plt Count 


 


MPV 


 


Immature Gran # 


 


Neutrophils # 


 


Lymphocytes # 


 


Eosinophils # 


 


D-Dimer 


 


Sodium 


 


Chloride 


 


Carbon Dioxide 


 


Anion Gap 


 


BUN 


 


BUN/Creatinine Ratio 


 


Glucose 


 


POC Glucose (mg/dL)  194 H


 


Calcium 


 


Magnesium 


 


AST 


 


ALT 


 


Lactate Dehydrogenase 


 


C-Reactive Protein 


 


Total Protein 


 


Albumin 


 


Albumin/Globulin Ratio 














Assessment and Plan


Assessment: 





Altered mental status due to multifactorial: Acute hypoxic encephalopathy and 

component of metabolic encephalopathy (sugar is range of 100-300) with mild 

elevated LFT's---No focality on examination.


Acute hypoxic respiratory failure related to COVID-19 pneumonia is on 

cylcosporine and Mycophenolate


Labile sugar


Mild hepatitis


History of stroke about 8 years ago without any residual deficit


vertigo status post tympanostomy 


Hypertension


Diabetes mellitus


Hyperlipidemia


Hypothyroidism


History of liver transplant for non-alcoholic steatoheaptitis ([facility]) 





Plan: 





CT of the brain without contrast is ordered by pulmonary team. If the patient 

continues to be altered then will get a routine EEG.


Ordered vitamin B12, folate and ammonia level.


Her last TSH level was on 07/29/2021 and it was 1.170 and a free T4 was 1.33 

therefore I would not repeat the test


Patient last hemoglobin A1c was 6.5 on 05/11/2021.


Placed on every 4 hours neuro checks


Pulmonary team is on board


Defer the rest of medical management to primary team





The plan is discussed with the patient's nurse.


Thank you for the consultation.





Anthony Lorenzana MD


Neuro-Hospitalist








Time with Patient: Greater than 30

## 2021-11-22 NOTE — P.PN
Subjective





This is a pleasant 76 results female with past medical history of 

hypothyroidism, liver transplant, CVA/TIA, hypertension, diabetes mellitus, 

hyperlipidemia


Patient presents because of difficulty breathing for about a week associated 

with cough and yellowish phlegm but no chest pain.


She had diarrhea like 3 times this morning.  She has diarrhea for 3 days.  No 

abdominal pain or vomiting.


She went to urgent care last Sunday about 3 days ago and they were diagnosed her

with pneumonia, next day her Covid test came back positive she denies history of

smoking, alcohol or illicit drugs.  She does not have history of COPD.  She is 

not on home oxygen





She is hypoxic at 83% on room air and tachypneic with a breathing rate 30-40, 

tachycardic 122.  She is afebrile at 97.8.


Labs unremarkable including CBC, INR, BMP.  Liver enzymes slightly elevated with

AST 83 and ALT 36.  Bilirubin is normal 1.0.


Chest x-ray: Bilateral infiltrate with interstitial prominence has increased





11/18/2021


Patient today is awake, she is slightly tachypneic while sitting in bed.  No 

significant dyspnea.  No significant coughing.  She denies chest pain.


She denies diarrhea.


She is saturating low 90s on 4 L oxygen via nasal cannula which is similar to 

yesterday.  Fever on admission 100.0.  Sided yesterday.


labs from today are still pending.


Sugar this morning to 30 patient is on insulin sliding scale


She remains on dexamethasone, and multiple vitamins.





11/19/2021


Patient breathing is stable and she is on 4 L/m of oxygen with good oxygen 

saturation.


Rest of vital signs stable, afebrile since admission when she had fever of 

100.2.  Labs are stable.


Leg ultrasound was negative.  She remains on dexamethasone vitamin C, D and 

zinc.


Possible discharge in 24-48 hours if she keeps improvement





11/20/2021


Patient today was L Delgadillo tired although she says she looks the same.  Her 

oxygen requirement went up to 6 L/m so we have to keep monitor the patient in 

the hospital


Blood pressure is slightly elevated while she is on steroids.  No more fevers 

since admission.  


She remains on dexamethasone and multiple vitamins and Lovenox





11/21/2021


Patient looks more tired, her breathing looks the same with dyspnea however her 

oxygen requirements increased to 8 L/m.


She has mild lab abnormality like mild increased liver enzymes, mild 

leukocytosis, mild hyponatremia.


Chest x-ray was showing slightly increased infiltrates


She remains on dexamethasone, Lovenox and multiple vitamins.


D-dimer is a stable 1.2-1.6





11/22/2021


Patient today was sensitive, children in bed while oxygen off, she wasn't as 

questions readily as of yesterday, altered mental status is suspected, CT of the

brain is requested as well as nephrology consult


Her oxygen requirement went up to 8 L/m.


She is afebrile and vitals are stable.


She still on dexamethasone, vitamin C, vitamin D and zinc.  Also she is on 

insulin and her Plavix.





Objective





- Vital Signs


Vital signs: 


                                   Vital Signs











Temp  98.4 F   11/22/21 10:00


 


Pulse  70   11/22/21 10:00


 


Resp  17   11/22/21 10:00


 


BP  119/56   11/22/21 10:00


 


Pulse Ox  97   11/22/21 10:00








                                 Intake & Output











 11/21/21 11/22/21 11/22/21





 18:59 06:59 18:59


 


Other:   


 


  # Voids 3 4 


 


  # Bowel Movements  2 














- Exam





GENERAL: The patient is alert and oriented x3, not in any acute distress. Well 

developed, well nourished. 


HEENT: Pupils are round and equally reacting to light. EOMI. No scleral icterus.

No conjunctival pallor. Normocephalic, atraumatic. No pharyngeal erythema. No 

thyromegaly. 


CARDIOVASCULAR: S1 and S2 present. No murmurs, rubs, or gallops. 


PULMONARY: Chest is clear to auscultation, no wheezing or crackles. 


ABDOMEN: Soft, nontender, nondistended, normoactive bowel sounds. No palpable 

organomegaly. 


MUSCULOSKELETAL: No joint swelling or deformity. 


EXTREMITIES: No cyanosis, clubbing, or pedal edema. 


NEUROLOGICAL: Gross neurological examination did not reveal any focal deficits. 


SKIN: No rashes. No petechiae





- Labs


CBC & Chem 7: 


                                 11/21/21 06:00





                                 11/21/21 06:00


Labs: 


                  Abnormal Lab Results - Last 24 Hours (Table)











  11/21/21 11/21/21 11/21/21 Range/Units





  06:00 06:00 17:02 


 


WBC  11.76 H    (4.50-10.00)  X 10*3/uL


 


RBC  3.91 L    (4.10-5.20)  X 10*6/uL


 


Hgb  10.3 L    (12.0-15.0)  g/dL


 


Hct  30.8 L    (37.2-46.3)  %


 


MCV  78.8 L    (80.0-97.0)  fL


 


MCH  26.3 L    (27.0-32.0)  pg


 


RDW  14.8 H    (11.5-14.5)  %


 


Plt Count  464 H    (140-440)  X 10*3/uL


 


MPV  9.1 L    (9.5-12.2)  fL


 


Immature Gran #  0.23 H    (0.00-0.04)  X 10*3/uL


 


Neutrophils #  9.35 H    (1.80-7.70)  X 10*3/uL


 


Eosinophils #  0 L    (0.04-0.35)  X 10*3/uL


 


Sodium   130 L   (135-145)  mmol/L


 


Chloride   95 L   ()  mmol/L


 


Carbon Dioxide   16.9 L   (21.6-31.8)  mmol/L


 


Anion Gap   18.60 H   (4.00-12.00)  mmol/L


 


BUN   28.7 H   (9.0-27.0)  mg/dL


 


BUN/Creatinine Ratio   33.88 H   (12.00-20.00)  Ratio


 


POC Glucose (mg/dL)    310 H  (75-99)  mg/dL


 


AST   88 H   (13-35)  U/L


 


ALT   81 H   (8-44)  U/L


 


Lactate Dehydrogenase   343 H   (120-246)  U/L


 


C-Reactive Protein   2.20 H   (0.00-0.80)  mg/dL


 


Albumin   3.7 L   (3.8-4.9)  g/dL


 


Albumin/Globulin Ratio   1.29 L   (1.60-3.17)  g/dL














  11/21/21 11/22/21 11/22/21 Range/Units





  20:58 07:32 11:20 


 


WBC     (4.50-10.00)  X 10*3/uL


 


RBC     (4.10-5.20)  X 10*6/uL


 


Hgb     (12.0-15.0)  g/dL


 


Hct     (37.2-46.3)  %


 


MCV     (80.0-97.0)  fL


 


MCH     (27.0-32.0)  pg


 


RDW     (11.5-14.5)  %


 


Plt Count     (140-440)  X 10*3/uL


 


MPV     (9.5-12.2)  fL


 


Immature Gran #     (0.00-0.04)  X 10*3/uL


 


Neutrophils #     (1.80-7.70)  X 10*3/uL


 


Eosinophils #     (0.04-0.35)  X 10*3/uL


 


Sodium     (135-145)  mmol/L


 


Chloride     ()  mmol/L


 


Carbon Dioxide     (21.6-31.8)  mmol/L


 


Anion Gap     (4.00-12.00)  mmol/L


 


BUN     (9.0-27.0)  mg/dL


 


BUN/Creatinine Ratio     (12.00-20.00)  Ratio


 


POC Glucose (mg/dL)  184 H  106 H  194 H  (75-99)  mg/dL


 


AST     (13-35)  U/L


 


ALT     (8-44)  U/L


 


Lactate Dehydrogenase     (120-246)  U/L


 


C-Reactive Protein     (0.00-0.80)  mg/dL


 


Albumin     (3.8-4.9)  g/dL


 


Albumin/Globulin Ratio     (1.60-3.17)  g/dL














Assessment and Plan


Assessment: 








Bilateral acute covid pneumonia


Acute hypoxic respiratory failure


Altered mental status, metabolic encephalopathy.  Ruled out intracranial lesion


Possible viral gastroenteritis secondary to Covid with mildly elevated liver 

enzymes


Hypothyroidism


History of liver transplant


Hypertension


History of CVA/TIA


Hyperlipidemia


Diabetes mellitus, on insulin

















Plan: 





This is a pleasant 76 years old female who presents with covid pneumonia


Continue with dexamethasone.  And insulin sliding scale


Continue with vitamin C, D and zinc


Pulmonary consult


Follow-up CT of the brain and neurology consult


Labs and medication were reviewed..  Continue same treatment.  Continue with 

symptomatic treatment.  Resume home medication.  Monitor lytes and vitals.  DVT 

and GI prophylaxis.  Further recommendations depends on the clinical course of 

the patient


DVT prophylaxis: Subcutaneous Lovenox


GI Prophylaxis: Pepcid


Prognosis is guarded

## 2021-11-22 NOTE — P.PN
Subjective


Progress Note Date: 11/22/21


Principal diagnosis: 


  COVID 19





  76-year-old white female patient with past medical history of a potential, 

previous history of CVA 8 years ago without residual deficits, liver tr

ansplantation for history of nonalcoholic steatohepatitis (HUTCHINSON) in [place], 

lifetime nonsmoker and nondrinker, who presented to the emergency department on 

11/17/2021 with complaints of shortness of breath, fever, and cough.  Patient 

tested positive for COVID 19 one week ago at an urgent care clinic in [place].  She was started on antibiotics for pneumonia the form of doxycycline.  

However her symptom onset was 2 weeks ago and initially started with fatigue and

loss of appetite and progressed to shortness of breath, cough and fever.  

Patient is not vaccinated for COVID-19 related to patient's history of severe 

reaction to even oral contrast when she has to have a CT of the abdomen comp

leted.  She is on 4 L of oxygen her pulse ox of 90-92%, her chest x-ray in 

emergency department shows interstitial prominence, asymmetric elevation of the 

right hemidiaphragm, no consolidation or pleural effusion.  Admission blood work

shows white blood cell, 4.5, hemoglobin of 10.5, platelet count is 261, 

lymphocyte count is 0.5, d-dimer is 1.2, INR is 1.0, sodium is 135, potassium is

4.7, CO2 17, BUN is 18, creatinine 0.71, magnesium is 1.2, AST is 83, ALT is 36,

alkaline phosphatase is 101, troponin is negative at 0.018, proBNP is 1860.  

Patient takes cyclosporine and mycophenilate for her history of liver 

transplant, she was started on Decadron 6 mg daily, she is on prophylactic dose 

Lovenox and COVID-19 vitamins.  She is breathing comfortably, has a dry 

nonproductive cough, she is afebrile.  She had a mild diarrhea and loss of 

appetite, but no vomiting or nausea.





On 11/19/2021 patient seen in follow-up on medical surgical floor, she is 

breathing comfortably, she is currently on 3 L of oxygen pulse ox is 90-95%, no 

acute events overnight, lower extremity Dopplers were negative for DVT in 

bilateral lower extremities, lung sounds reveal minimal crackles at the bases, 

no nausea vomiting no diarrhea, no acute events overnight, no fever or chills.





On 11/22/2021 patient seen in follow-up on medical surgical floor, she seems to 

be breathing comfortably, she is sitting by the window, she is currently on 8 L 

of oxygen however nursing staff reports difficulty keeping the patient's oxygen 

on her and she frequently takes it off.  When she is wearing it her pulse ox is 

96-97% on 8 L, afebrile, hemodynamically she has been stable however 

neurologically patient seems more confused, inattentive, she is only oriented to

self, disoriented to place, and time, she thought Moe Dillon was the 

president.  She is having difficulty concentrating, it takes repetition with 

questioning to get her to pay attention.  From pulmonary perspective although 

she still requiring high concentration oxygen she does not appear to be in any 

distress, lung sounds reveal bibasilar crackles, no rhonchi or wheezing.  She is

currently on Decadron 6 mg daily, she is on prophylactic Lovenox, she is on 

vitamins.  Yesterday's chest x-ray was showing bilateral left greater than right

interstitial and patchy opacities slightly increased in the interval.  Patient 

has been incontinent, she is wearing a brief, however she will not wear P and's,

and last night nursing staff reports she got up unassisted and was ambulating 

down the grijalva without her brief or oxygen. NO focal neurological deficits, howev

er patient had a previous history of CVA in the past, will obtain brain CT and 

consult neurology








Objective





- Vital Signs


Vital signs: 


                                   Vital Signs











Temp  98.4 F   11/22/21 10:00


 


Pulse  70   11/22/21 10:00


 


Resp  17   11/22/21 10:00


 


BP  119/56   11/22/21 10:00


 


Pulse Ox  97   11/22/21 10:00








                                 Intake & Output











 11/21/21 11/22/21 11/22/21





 18:59 06:59 18:59


 


Other:   


 


  # Voids 3 4 


 


  # Bowel Movements  2 














- Exam


 GENERAL EXAM: Alert, but confused, 76-year-old white female, on 8 L of oxygen 

with a pulse ox of 97% comfortable in no apparent distress. Inattentive, takes 

several repetitions to get patient to answer questions


HEAD: Normocephalic/atraumatic.


EYES: Normal reaction of pupils, equal size.  Conjunctiva pink, sclera white.


NOSE: Clear with pink turbinates.


THROAT: No erythema or exudates.


NECK: No masses, no JVD, no thyroid enlargement, no adenopathy.


CHEST: No chest wall deformity.  Symmetrical expansion. 


LUNGS: Equal air entry with mild bibasilar crackles


CVS: Regular rate and rhythm, normal S1 and S2, no gallops, no murmurs, no rubs


ABDOMEN: Soft, nontender.  No hepatosplenomegaly, normal bowel sounds, no 

guarding or rigidity.


EXTREMITIES: No clubbing, no edema, no cyanosis, 2+ pulses and upper and lower 

extremities.


MUSCULOSKELETAL: Muscle strength and tone normal.


SPINE: No scoliosis or deformity


SKIN: No rashes


CENTRAL NERVOUS SYSTEM: Alert and oriented -1.  No focal deficits, tone is 

normal in all 4 extremities.


PSYCHIATRIC: Alert and oriented -1.  Appropriate affect.  Intact judgment and 

insight.











- Labs


CBC & Chem 7: 


                                 11/21/21 06:00





                                 11/21/21 06:00


Labs: 


                  Abnormal Lab Results - Last 24 Hours (Table)











  11/21/21 11/21/21 11/21/21 Range/Units





  06:00 17:02 20:58 


 


Sodium  130 L    (135-145)  mmol/L


 


Chloride  95 L    ()  mmol/L


 


Carbon Dioxide  16.9 L    (21.6-31.8)  mmol/L


 


Anion Gap  18.60 H    (4.00-12.00)  mmol/L


 


BUN  28.7 H    (9.0-27.0)  mg/dL


 


BUN/Creatinine Ratio  33.88 H    (12.00-20.00)  Ratio


 


POC Glucose (mg/dL)   310 H  184 H  (75-99)  mg/dL


 


AST  88 H    (13-35)  U/L


 


ALT  81 H    (8-44)  U/L


 


C-Reactive Protein  2.20 H    (0.00-0.80)  mg/dL


 


Albumin  3.7 L    (3.8-4.9)  g/dL


 


Albumin/Globulin Ratio  1.29 L    (1.60-3.17)  g/dL














  11/22/21 11/22/21 Range/Units





  07:32 11:20 


 


Sodium    (135-145)  mmol/L


 


Chloride    ()  mmol/L


 


Carbon Dioxide    (21.6-31.8)  mmol/L


 


Anion Gap    (4.00-12.00)  mmol/L


 


BUN    (9.0-27.0)  mg/dL


 


BUN/Creatinine Ratio    (12.00-20.00)  Ratio


 


POC Glucose (mg/dL)  106 H  194 H  (75-99)  mg/dL


 


AST    (13-35)  U/L


 


ALT    (8-44)  U/L


 


C-Reactive Protein    (0.00-0.80)  mg/dL


 


Albumin    (3.8-4.9)  g/dL


 


Albumin/Globulin Ratio    (1.60-3.17)  g/dL














Assessment and Plan


Plan: 


 Assessment:





#1.  Acute hypoxic respiratory failure related to COVID-19 pneumonia with onset 

of symptoms 2 weeks ago, patient is outside the window for Remdesivir, she has 

not vaccinated





#2.  Altered mental status, confusion, possibly delirium, CT brain is pending, 

we will consult neurology





#3.  History of liver transplant by history of nonalcoholic steatohepatitis, she

is on cyclosporine and Mycophenolate





#4.  Hypertension





#5.  Diabetes mellitus





#6.  Hyperlipidemia





#7.  Previous history of CVA with no residual neurologic deficits





#8.  Hypothyroidism





#9.  Lifetime nonsmoker





Plan:





Weaning FiO2 to maintain O2 saturations at or above 90%


Patient may need a safety sitter to keep her oxygen on


Seems to be more confused on today's exam,


Has a previous history of CVA, will obtain CT of the brain without


Consult neurology


Continue to follow her clinical course





I performed a history & physical examination of the patient and discussed their 

management with my nurse practitioner, Maxine Palomares.  I reviewed the nurse 

practitioner's note and agree with the documented findings and plan of care.  

Lung sounds are positive for diffuse crackles throughout the lung fields.  The 

findings and the impression was discussed with the patient.  I attest to the 

documentation by the nurse practitioner. 











Time with Patient: Less than 30

## 2021-11-22 NOTE — CT
EXAMINATION TYPE: CT brain wo con

 

DATE OF EXAM: 11/22/2021

 

COMPARISON: 11/11/2020

 

HISTORY: Weakness

 

CT DLP: 1159.4 mGycm

Automated exposure control for dose reduction was used.

 

There is cerebral cortical atrophy. There is no mass effect nor midline shift. There is no evidence o
f intracranial hemorrhage. Calvarium is intact. Skull base is intact. There is mucosal thickening in 
the left mastoid sinuses.

 

IMPRESSION:

Cerebral atrophy. No acute intracranial abnormality. Left-sided mastoiditis. No change compared to ol
d exam.

## 2021-11-23 LAB
ALBUMIN SERPL-MCNC: 3.8 G/DL (ref 3.8–4.9)
ALBUMIN/GLOB SERPL: 1.19 G/DL (ref 1.6–3.17)
ALP SERPL-CCNC: 101 U/L (ref 41–126)
ALT SERPL-CCNC: 68 U/L (ref 8–44)
ANION GAP SERPL CALC-SCNC: 18.8 MMOL/L (ref 4–12)
AST SERPL-CCNC: 48 U/L (ref 13–35)
BASOPHILS # BLD AUTO: 0.01 X 10*3/UL (ref 0–0.1)
BASOPHILS NFR BLD AUTO: 0.1 %
BUN SERPL-SCNC: 30.7 MG/DL (ref 9–27)
BUN/CREAT SERPL: 34.11 RATIO (ref 12–20)
CALCIUM SPEC-MCNC: 9.4 MG/DL (ref 8.7–10.3)
CHLORIDE SERPL-SCNC: 94 MMOL/L (ref 96–109)
CO2 SERPL-SCNC: 18.2 MMOL/L (ref 21.6–31.8)
EOSINOPHIL # BLD AUTO: 0.01 X 10*3/UL (ref 0.04–0.35)
EOSINOPHIL NFR BLD AUTO: 0.1 %
ERYTHROCYTE [DISTWIDTH] IN BLOOD BY AUTOMATED COUNT: 4.53 X 10*6/UL (ref 4.1–5.2)
ERYTHROCYTE [DISTWIDTH] IN BLOOD: 14.8 % (ref 11.5–14.5)
GLOBULIN SER CALC-MCNC: 3.2 G/DL (ref 1.6–3.3)
GLUCOSE BLD-MCNC: 130 MG/DL (ref 75–99)
GLUCOSE BLD-MCNC: 141 MG/DL (ref 75–99)
GLUCOSE BLD-MCNC: 166 MG/DL (ref 75–99)
GLUCOSE BLD-MCNC: 199 MG/DL (ref 75–99)
GLUCOSE SERPL-MCNC: 158 MG/DL (ref 70–110)
HCT VFR BLD AUTO: 35.6 % (ref 37.2–46.3)
HGB BLD-MCNC: 11.4 G/DL (ref 12–15)
LDH SPEC-CCNC: 366 U/L (ref 120–246)
LYMPHOCYTES # SPEC AUTO: 1.07 X 10*3/UL (ref 0.9–5)
LYMPHOCYTES NFR SPEC AUTO: 10.3 %
MAGNESIUM SPEC-SCNC: 1.8 MG/DL (ref 1.5–2.4)
MCH RBC QN AUTO: 25.2 PG (ref 27–32)
MCHC RBC AUTO-ENTMCNC: 32 G/DL (ref 32–37)
MCV RBC AUTO: 78.6 FL (ref 80–97)
MONOCYTES # BLD AUTO: 0.79 X 10*3/UL (ref 0.2–1)
MONOCYTES NFR BLD AUTO: 7.6 %
NEUTROPHILS # BLD AUTO: 8.35 X 10*3/UL (ref 1.8–7.7)
NEUTROPHILS NFR BLD AUTO: 80.1 %
PLATELET # BLD AUTO: 611 X 10*3/UL (ref 140–440)
POTASSIUM SERPL-SCNC: 4.8 MMOL/L (ref 3.5–5.5)
PROT SERPL-MCNC: 7 G/DL (ref 6.2–8.2)
SODIUM SERPL-SCNC: 131 MMOL/L (ref 135–145)
VIT B12 SERPL-MCNC: 900 PG/ML (ref 200–944)
WBC # BLD AUTO: 10.42 X 10*3/UL (ref 4.5–10)

## 2021-11-23 RX ADMIN — METOPROLOL TARTRATE SCH MG: 50 TABLET, FILM COATED ORAL at 21:39

## 2021-11-23 RX ADMIN — INSULIN ASPART SCH UNIT: 100 INJECTION, SOLUTION INTRAVENOUS; SUBCUTANEOUS at 16:59

## 2021-11-23 RX ADMIN — MYCOPHENOLIC ACID SCH MG: 180 TABLET, DELAYED RELEASE ORAL at 10:03

## 2021-11-23 RX ADMIN — INSULIN ASPART SCH UNIT: 100 INJECTION, SOLUTION INTRAVENOUS; SUBCUTANEOUS at 07:26

## 2021-11-23 RX ADMIN — METOPROLOL TARTRATE SCH MG: 50 TABLET, FILM COATED ORAL at 07:26

## 2021-11-23 RX ADMIN — FAMOTIDINE SCH MG: 20 TABLET, FILM COATED ORAL at 21:38

## 2021-11-23 RX ADMIN — LEVOTHYROXINE SODIUM SCH MCG: 50 TABLET ORAL at 05:37

## 2021-11-23 RX ADMIN — MECLIZINE HYDROCHLORIDE SCH MG: 25 TABLET ORAL at 21:39

## 2021-11-23 RX ADMIN — MYCOPHENOLIC ACID SCH MG: 180 TABLET, DELAYED RELEASE ORAL at 21:39

## 2021-11-23 RX ADMIN — INSULIN ASPART SCH: 100 INJECTION, SOLUTION INTRAVENOUS; SUBCUTANEOUS at 12:46

## 2021-11-23 RX ADMIN — Medication SCH MCG: at 07:27

## 2021-11-23 RX ADMIN — DEXTROSE SCH: 50 INJECTION, SOLUTION INTRAVENOUS at 12:47

## 2021-11-23 RX ADMIN — Medication SCH EACH: at 07:26

## 2021-11-23 RX ADMIN — ASPIRIN SCH: 325 TABLET ORAL at 21:39

## 2021-11-23 RX ADMIN — FAMOTIDINE SCH MG: 20 TABLET, FILM COATED ORAL at 07:27

## 2021-11-23 RX ADMIN — MECLIZINE HYDROCHLORIDE SCH MG: 25 TABLET ORAL at 07:27

## 2021-11-23 RX ADMIN — INSULIN ASPART SCH: 100 INJECTION, SOLUTION INTRAVENOUS; SUBCUTANEOUS at 21:38

## 2021-11-23 RX ADMIN — ACETAMINOPHEN PRN MG: 500 TABLET ORAL at 16:15

## 2021-11-23 RX ADMIN — Medication SCH MG: at 07:27

## 2021-11-23 RX ADMIN — INSULIN DETEMIR SCH UNIT: 100 INJECTION, SOLUTION SUBCUTANEOUS at 07:26

## 2021-11-23 RX ADMIN — ENOXAPARIN SODIUM SCH MG: 40 INJECTION SUBCUTANEOUS at 07:26

## 2021-11-23 RX ADMIN — CLOPIDOGREL BISULFATE SCH MG: 75 TABLET ORAL at 07:26

## 2021-11-23 NOTE — P.PN
Subjective


Progress Note Date: 11/23/21


Principal diagnosis: 


  COVID 19





  76-year-old white female patient with past medical history of a potential, 

previous history of CVA 8 years ago without residual deficits, liver tr

ansplantation for history of nonalcoholic steatohepatitis (HUTCHINSON) in [place], 

lifetime nonsmoker and nondrinker, who presented to the emergency department on 

11/17/2021 with complaints of shortness of breath, fever, and cough.  Patient 

tested positive for COVID 19 one week ago at an urgent care clinic in [place].  She was started on antibiotics for pneumonia the form of doxycycline.  

However her symptom onset was 2 weeks ago and initially started with fatigue and

loss of appetite and progressed to shortness of breath, cough and fever.  

Patient is not vaccinated for COVID-19 related to patient's history of severe 

reaction to even oral contrast when she has to have a CT of the abdomen comp

leted.  She is on 4 L of oxygen her pulse ox of 90-92%, her chest x-ray in 

emergency department shows interstitial prominence, asymmetric elevation of the 

right hemidiaphragm, no consolidation or pleural effusion.  Admission blood work

shows white blood cell, 4.5, hemoglobin of 10.5, platelet count is 261, 

lymphocyte count is 0.5, d-dimer is 1.2, INR is 1.0, sodium is 135, potassium is

4.7, CO2 17, BUN is 18, creatinine 0.71, magnesium is 1.2, AST is 83, ALT is 36,

alkaline phosphatase is 101, troponin is negative at 0.018, proBNP is 1860.  

Patient takes cyclosporine and mycophenilate for her history of liver 

transplant, she was started on Decadron 6 mg daily, she is on prophylactic dose 

Lovenox and COVID-19 vitamins.  She is breathing comfortably, has a dry 

nonproductive cough, she is afebrile.  She had a mild diarrhea and loss of 

appetite, but no vomiting or nausea.





On 11/19/2021 patient seen in follow-up on medical surgical floor, she is 

breathing comfortably, she is currently on 3 L of oxygen pulse ox is 90-95%, no 

acute events overnight, lower extremity Dopplers were negative for DVT in 

bilateral lower extremities, lung sounds reveal minimal crackles at the bases, 

no nausea vomiting no diarrhea, no acute events overnight, no fever or chills.





On 11/22/2021 patient seen in follow-up on medical surgical floor, she seems to 

be breathing comfortably, she is sitting by the window, she is currently on 8 L 

of oxygen however nursing staff reports difficulty keeping the patient's oxygen 

on her and she frequently takes it off.  When she is wearing it her pulse ox is 

96-97% on 8 afebrile, hemodynamically she has been stable however neurologically

patient seems more confused, inattentive, she is only oriented to self, 

disoriented to place, and time, she thought Moe Dillon was the president.  She

is having difficulty concentrating, it takes repetition with questioning to get 

her to pay attention.  From pulmonary perspective although she still requiring 

high concentration oxygen she does not appear to be in any distress, lung sounds

reveal bibasilar crackles, no rhonchi or wheezing.  She is currently on Decadron

6 mg daily, she is on prophylactic Lovenox, she is on vitamins.  Yesterday's 

chest x-ray was showing bilateral left greater than right interstitial and patc

hy opacities slightly increased in the interval.  Patient has been incontinent, 

she is wearing a brief, however she will not wear P and's, and last night 

nursing staff reports she got up unassisted and was ambulating down the grijalva 

without her brief or oxygen. NO focal neurological deficits, however patient had

a previous history of CVA in the past, will obtain brain CT and consult 

neurology





On 11/23/2021 patient seen in follow-up on the surgical floor, she remains very 

confused, cannot keep her oxygen on, takes it off, and nursing staff needs to 

check on her to make sure the oxygen is on.  She was attempting to eat the 

earpieces on the nasal cannula.  Brain CT showed no acute intracranial a

bnormality.  She was seen by neurology please refer to the consultation.  She 

has been moved closer to the desk for closer monitoring.  On sounds reveal 

minimal crackles at the bases, no worsening dyspnea, she is currently on 8 L of 

oxygen and her pulse ox is 91%. Procalcitonin negative.  Blood sugars have been 

elevated and ranging from 140-318.  Patient on sliding scale insulin.





Objective





- Vital Signs


Vital signs: 


                                   Vital Signs











Temp  98.0 F   11/23/21 14:00


 


Pulse  67   11/23/21 14:00


 


Resp  18   11/23/21 14:00


 


BP  138/81   11/23/21 14:00


 


Pulse Ox  91 L  11/23/21 14:00








                                 Intake & Output











 11/22/21 11/23/21 11/23/21





 18:59 06:59 18:59


 


Intake Total  160 


 


Balance  160 


 


Intake:   


 


  Oral  160 


 


Other:   


 


  Voiding Method  Toilet 


 


  # Voids 1 2 


 


  # Bowel Movements  1 














- Exam


 GENERAL EXAM: Alert, but confused, 76-year-old white female, on 6 L of oxygen 

with a pulse ox of 91% comfortable in no apparent distress. Inattentive, takes 

several repetitions to get patient to answer questions


HEAD: Normocephalic/atraumatic.


EYES: Normal reaction of pupils, equal size.  Conjunctiva pink, sclera white.


NOSE: Clear with pink turbinates.


THROAT: No erythema or exudates.


NECK: No masses, no JVD, no thyroid enlargement, no adenopathy.


CHEST: No chest wall deformity.  Symmetrical expansion. 


LUNGS: Equal air entry with mild bibasilar crackles


CVS: Regular rate and rhythm, normal S1 and S2, no gallops, no murmurs, no rubs


ABDOMEN: Soft, nontender.  No hepatosplenomegaly, normal bowel sounds, no 

guarding or rigidity.


EXTREMITIES: No clubbing, no edema, no cyanosis, 2+ pulses and upper and lower 

extremities.


MUSCULOSKELETAL: Muscle strength and tone normal.


SPINE: No scoliosis or deformity


SKIN: No rashes


CENTRAL NERVOUS SYSTEM: Alert and oriented -1.  No focal deficits, tone is 

normal in all 4 extremities.


PSYCHIATRIC: Alert and oriented -1.  Appropriate affect.  Intact judgment and 

insight.











- Labs


CBC & Chem 7: 


                                 11/23/21 06:12





                                 11/23/21 06:12


Labs: 


                  Abnormal Lab Results - Last 24 Hours (Table)











  11/22/21 11/22/21 11/23/21 Range/Units





  17:07 20:57 06:12 


 


WBC     (4.50-10.00)  X 10*3/uL


 


Hgb     (12.0-15.0)  g/dL


 


Hct     (37.2-46.3)  %


 


MCV     (80.0-97.0)  fL


 


MCH     (27.0-32.0)  pg


 


RDW     (11.5-14.5)  %


 


Plt Count     (140-440)  X 10*3/uL


 


MPV     (9.5-12.2)  fL


 


Immature Gran #     (0.00-0.04)  X 10*3/uL


 


Neutrophils #     (1.80-7.70)  X 10*3/uL


 


Eosinophils #     (0.04-0.35)  X 10*3/uL


 


Sodium     (135-145)  mmol/L


 


Chloride     ()  mmol/L


 


Carbon Dioxide     (21.6-31.8)  mmol/L


 


Anion Gap     (4.00-12.00)  mmol/L


 


BUN     (9.0-27.0)  mg/dL


 


BUN/Creatinine Ratio     (12.00-20.00)  Ratio


 


Glucose     ()  mg/dL


 


POC Glucose (mg/dL)  360 H  318 H   (75-99)  mg/dL


 


AST     (13-35)  U/L


 


ALT     (8-44)  U/L


 


Lactate Dehydrogenase     (120-246)  U/L


 


C-Reactive Protein     (0.00-0.80)  mg/dL


 


Albumin/Globulin Ratio     (1.60-3.17)  g/dL


 


Procalcitonin    0.11 H  (0.02-0.09)  ng/mL














  11/23/21 11/23/21 11/23/21 Range/Units





  06:12 06:12 07:11 


 


WBC   10.42 H   (4.50-10.00)  X 10*3/uL


 


Hgb   11.4 L   (12.0-15.0)  g/dL


 


Hct   35.6 L   (37.2-46.3)  %


 


MCV   78.6 L   (80.0-97.0)  fL


 


MCH   25.2 L   (27.0-32.0)  pg


 


RDW   14.8 H   (11.5-14.5)  %


 


Plt Count   611 H   (140-440)  X 10*3/uL


 


MPV   9.3 L   (9.5-12.2)  fL


 


Immature Gran #   0.19 H   (0.00-0.04)  X 10*3/uL


 


Neutrophils #   8.35 H   (1.80-7.70)  X 10*3/uL


 


Eosinophils #   0.01 L   (0.04-0.35)  X 10*3/uL


 


Sodium  131 L    (135-145)  mmol/L


 


Chloride  94 L    ()  mmol/L


 


Carbon Dioxide  18.2 L    (21.6-31.8)  mmol/L


 


Anion Gap  18.80 H    (4.00-12.00)  mmol/L


 


BUN  30.7 H    (9.0-27.0)  mg/dL


 


BUN/Creatinine Ratio  34.11 H    (12.00-20.00)  Ratio


 


Glucose  158 H    ()  mg/dL


 


POC Glucose (mg/dL)    166 H  (75-99)  mg/dL


 


AST  48 H    (13-35)  U/L


 


ALT  68 H    (8-44)  U/L


 


Lactate Dehydrogenase  366 H    (120-246)  U/L


 


C-Reactive Protein  7.90 H    (0.00-0.80)  mg/dL


 


Albumin/Globulin Ratio  1.19 L    (1.60-3.17)  g/dL


 


Procalcitonin     (0.02-0.09)  ng/mL














  11/23/21 Range/Units





  11:39 


 


WBC   (4.50-10.00)  X 10*3/uL


 


Hgb   (12.0-15.0)  g/dL


 


Hct   (37.2-46.3)  %


 


MCV   (80.0-97.0)  fL


 


MCH   (27.0-32.0)  pg


 


RDW   (11.5-14.5)  %


 


Plt Count   (140-440)  X 10*3/uL


 


MPV   (9.5-12.2)  fL


 


Immature Gran #   (0.00-0.04)  X 10*3/uL


 


Neutrophils #   (1.80-7.70)  X 10*3/uL


 


Eosinophils #   (0.04-0.35)  X 10*3/uL


 


Sodium   (135-145)  mmol/L


 


Chloride   ()  mmol/L


 


Carbon Dioxide   (21.6-31.8)  mmol/L


 


Anion Gap   (4.00-12.00)  mmol/L


 


BUN   (9.0-27.0)  mg/dL


 


BUN/Creatinine Ratio   (12.00-20.00)  Ratio


 


Glucose   ()  mg/dL


 


POC Glucose (mg/dL)  141 H  (75-99)  mg/dL


 


AST   (13-35)  U/L


 


ALT   (8-44)  U/L


 


Lactate Dehydrogenase   (120-246)  U/L


 


C-Reactive Protein   (0.00-0.80)  mg/dL


 


Albumin/Globulin Ratio   (1.60-3.17)  g/dL


 


Procalcitonin   (0.02-0.09)  ng/mL














Assessment and Plan


Plan: 


 Assessment:





#1.  Acute hypoxic respiratory failure related to COVID-19 pneumonia with onset 

of symptoms 2 weeks ago, patient is outside the window for Remdesivir, she has 

not vaccinated





#2.  Altered mental status, confusion, brain CT was negative, related to 

metabolic and hypoxic encephalopathy, neurology is following





#3.  History of liver transplant by history of nonalcoholic steatohepatitis, she

is on cyclosporine and Mycophenolate





#4.  Hypertension





#5.  Diabetes mellitus





#6.  Hyperlipidemia





#7.  Previous history of CVA with no residual neurologic deficits





#8.  Hypothyroidism





#9.  Lifetime nonsmoker





Plan:





Continues to be confused


Frequently removes oxygen


CT brain has been noted


Neurology consultation was appreciated


May need a safety sitter


Titrate FiO2 to keep O2 sats saturations at 90% and above


Continue Lovenox, we'll switch IV Decadron to oral


continue to follow her clinical course





I performed a history & physical examination of the patient and discussed their 

management with my nurse practitioner, Maxine Palomares.  I reviewed the nurse 

practitioner's note and agree with the documented findings and plan of care.  

Lung sounds are positive for diffuse crackles throughout the lung fields.  The 

findings and the impression was discussed with the patient.  I attest to the 

documentation by the nurse practitioner. 











Time with Patient: Less than 30

## 2021-11-23 NOTE — P.PN
Subjective





This is a pleasant 76 results female with past medical history of 

hypothyroidism, liver transplant, CVA/TIA, hypertension, diabetes mellitus, 

hyperlipidemia


Patient presents because of difficulty breathing for about a week associated 

with cough and yellowish phlegm but no chest pain.


She had diarrhea like 3 times this morning.  She has diarrhea for 3 days.  No 

abdominal pain or vomiting.


She went to urgent care last Sunday about 3 days ago and they were diagnosed her

with pneumonia, next day her Covid test came back positive she denies history of

smoking, alcohol or illicit drugs.  She does not have history of COPD.  She is 

not on home oxygen





She is hypoxic at 83% on room air and tachypneic with a breathing rate 30-40, 

tachycardic 122.  She is afebrile at 97.8.


Labs unremarkable including CBC, INR, BMP.  Liver enzymes slightly elevated with

AST 83 and ALT 36.  Bilirubin is normal 1.0.


Chest x-ray: Bilateral infiltrate with interstitial prominence has increased





11/18/2021


Patient today is awake, she is slightly tachypneic while sitting in bed.  No 

significant dyspnea.  No significant coughing.  She denies chest pain.


She denies diarrhea.


She is saturating low 90s on 4 L oxygen via nasal cannula which is similar to 

yesterday.  Fever on admission 100.0.  Sided yesterday.


labs from today are still pending.


Sugar this morning to 30 patient is on insulin sliding scale


She remains on dexamethasone, and multiple vitamins.





11/19/2021


Patient breathing is stable and she is on 4 L/m of oxygen with good oxygen 

saturation.


Rest of vital signs stable, afebrile since admission when she had fever of 

100.2.  Labs are stable.


Leg ultrasound was negative.  She remains on dexamethasone vitamin C, D and 

zinc.


Possible discharge in 24-48 hours if she keeps improvement





11/20/2021


Patient today was L Delgadillo tired although she says she looks the same.  Her 

oxygen requirement went up to 6 L/m so we have to keep monitor the patient in 

the hospital


Blood pressure is slightly elevated while she is on steroids.  No more fevers 

since admission.  


She remains on dexamethasone and multiple vitamins and Lovenox





11/21/2021


Patient looks more tired, her breathing looks the same with dyspnea however her 

oxygen requirements increased to 8 L/m.


She has mild lab abnormality like mild increased liver enzymes, mild 

leukocytosis, mild hyponatremia.


Chest x-ray was showing slightly increased infiltrates


She remains on dexamethasone, Lovenox and multiple vitamins.


D-dimer is a stable 1.2-1.6





11/22/2021


Patient today was sensitive, children in bed while oxygen off, she wasn't as 

questions readily as of yesterday, altered mental status is suspected, CT of the

brain is requested as well as nephrology consult


Her oxygen requirement went up to 8 L/m.


She is afebrile and vitals are stable.


She still on dexamethasone, vitamin C, vitamin D and zinc.  Also she is on 

insulin and her Plavix.





11/23/2021


Patient still Confused at time, when I talked the patient should've very 

appropriate and oriented however better on per staff she started taking her 

oxygen off again.  Suggest saturation and requirements went up to 8 L/m again 

today.


She is mildly tachypneic but denies any specific symptoms like no chest pain.  

No dizziness.  No abdominal pain or diarrhea or vomiting.  No headache or 

difficulty moving extremities.


She is hemodynamically stable other than the hypoxia.  No more fever.


Her exam is only mildly elevated but ammonia is negative with less than 9.  WBC 

normal and stable around 10+.  Sodium stable 1:.  LDH slightly elevated at

366 and subjective 14 7.9.


She remains on dexamethasone, vitamin C, D and zinc.  Also her sugar is 

controlled on home dose of Levemir 40 units at bedtime.


She is also on Pepcid and Lovenox 





Objective





- Vital Signs


Vital signs: 


                                   Vital Signs











Temp  97.9 F   11/23/21 08:00


 


Pulse  65   11/23/21 08:00


 


Resp  22   11/23/21 08:00


 


BP  135/78   11/23/21 08:00


 


Pulse Ox  91 L  11/23/21 08:00








                                 Intake & Output











 11/22/21 11/23/21 11/23/21





 18:59 06:59 18:59


 


Intake Total  160 


 


Balance  160 


 


Intake:   


 


  Oral  160 


 


Other:   


 


  Voiding Method  Toilet 


 


  # Voids 1 2 


 


  # Bowel Movements  1 














- Exam





GENERAL: The patient is alert and oriented x3, not in any acute distress. Well 

developed, well nourished. 


HEENT: Pupils are round and equally reacting to light. EOMI. No scleral icterus.

No conjunctival pallor. Normocephalic, atraumatic. No pharyngeal erythema. No 

thyromegaly. 


CARDIOVASCULAR: S1 and S2 present. No murmurs, rubs, or gallops. 


PULMONARY: Chest is clear to auscultation, no wheezing or crackles. 


ABDOMEN: Soft, nontender, nondistended, normoactive bowel sounds. No palpable 

organomegaly. 


MUSCULOSKELETAL: No joint swelling or deformity. 


EXTREMITIES: No cyanosis, clubbing, or pedal edema. 


NEUROLOGICAL: Gross neurological examination did not reveal any focal deficits. 


SKIN: No rashes. No petechiae





- Labs


CBC & Chem 7: 


                                 11/23/21 06:12





                                 11/23/21 06:12


Labs: 


                  Abnormal Lab Results - Last 24 Hours (Table)











  11/22/21 11/22/21 11/23/21 Range/Units





  17:07 20:57 06:12 


 


WBC     (4.50-10.00)  X 10*3/uL


 


Hgb     (12.0-15.0)  g/dL


 


Hct     (37.2-46.3)  %


 


MCV     (80.0-97.0)  fL


 


MCH     (27.0-32.0)  pg


 


RDW     (11.5-14.5)  %


 


Plt Count     (140-440)  X 10*3/uL


 


MPV     (9.5-12.2)  fL


 


Immature Gran #     (0.00-0.04)  X 10*3/uL


 


Neutrophils #     (1.80-7.70)  X 10*3/uL


 


Eosinophils #     (0.04-0.35)  X 10*3/uL


 


Sodium     (135-145)  mmol/L


 


Chloride     ()  mmol/L


 


Carbon Dioxide     (21.6-31.8)  mmol/L


 


Anion Gap     (4.00-12.00)  mmol/L


 


BUN     (9.0-27.0)  mg/dL


 


BUN/Creatinine Ratio     (12.00-20.00)  Ratio


 


Glucose     ()  mg/dL


 


POC Glucose (mg/dL)  360 H  318 H   (75-99)  mg/dL


 


AST     (13-35)  U/L


 


ALT     (8-44)  U/L


 


Lactate Dehydrogenase     (120-246)  U/L


 


C-Reactive Protein     (0.00-0.80)  mg/dL


 


Albumin/Globulin Ratio     (1.60-3.17)  g/dL


 


Procalcitonin    0.11 H  (0.02-0.09)  ng/mL














  11/23/21 11/23/21 11/23/21 Range/Units





  06:12 06:12 07:11 


 


WBC   10.42 H   (4.50-10.00)  X 10*3/uL


 


Hgb   11.4 L   (12.0-15.0)  g/dL


 


Hct   35.6 L   (37.2-46.3)  %


 


MCV   78.6 L   (80.0-97.0)  fL


 


MCH   25.2 L   (27.0-32.0)  pg


 


RDW   14.8 H   (11.5-14.5)  %


 


Plt Count   611 H   (140-440)  X 10*3/uL


 


MPV   9.3 L   (9.5-12.2)  fL


 


Immature Gran #   0.19 H   (0.00-0.04)  X 10*3/uL


 


Neutrophils #   8.35 H   (1.80-7.70)  X 10*3/uL


 


Eosinophils #   0.01 L   (0.04-0.35)  X 10*3/uL


 


Sodium  131 L    (135-145)  mmol/L


 


Chloride  94 L    ()  mmol/L


 


Carbon Dioxide  18.2 L    (21.6-31.8)  mmol/L


 


Anion Gap  18.80 H    (4.00-12.00)  mmol/L


 


BUN  30.7 H    (9.0-27.0)  mg/dL


 


BUN/Creatinine Ratio  34.11 H    (12.00-20.00)  Ratio


 


Glucose  158 H    ()  mg/dL


 


POC Glucose (mg/dL)    166 H  (75-99)  mg/dL


 


AST  48 H    (13-35)  U/L


 


ALT  68 H    (8-44)  U/L


 


Lactate Dehydrogenase  366 H    (120-246)  U/L


 


C-Reactive Protein  7.90 H    (0.00-0.80)  mg/dL


 


Albumin/Globulin Ratio  1.19 L    (1.60-3.17)  g/dL


 


Procalcitonin     (0.02-0.09)  ng/mL














  11/23/21 Range/Units





  11:39 


 


WBC   (4.50-10.00)  X 10*3/uL


 


Hgb   (12.0-15.0)  g/dL


 


Hct   (37.2-46.3)  %


 


MCV   (80.0-97.0)  fL


 


MCH   (27.0-32.0)  pg


 


RDW   (11.5-14.5)  %


 


Plt Count   (140-440)  X 10*3/uL


 


MPV   (9.5-12.2)  fL


 


Immature Gran #   (0.00-0.04)  X 10*3/uL


 


Neutrophils #   (1.80-7.70)  X 10*3/uL


 


Eosinophils #   (0.04-0.35)  X 10*3/uL


 


Sodium   (135-145)  mmol/L


 


Chloride   ()  mmol/L


 


Carbon Dioxide   (21.6-31.8)  mmol/L


 


Anion Gap   (4.00-12.00)  mmol/L


 


BUN   (9.0-27.0)  mg/dL


 


BUN/Creatinine Ratio   (12.00-20.00)  Ratio


 


Glucose   ()  mg/dL


 


POC Glucose (mg/dL)  141 H  (75-99)  mg/dL


 


AST   (13-35)  U/L


 


ALT   (8-44)  U/L


 


Lactate Dehydrogenase   (120-246)  U/L


 


C-Reactive Protein   (0.00-0.80)  mg/dL


 


Albumin/Globulin Ratio   (1.60-3.17)  g/dL


 


Procalcitonin   (0.02-0.09)  ng/mL














Assessment and Plan


Assessment: 








Bilateral acute covid pneumonia


Acute hypoxic respiratory failure


Altered mental status, metabolic encephalopathy.  Ruled out intracranial lesion


Possible viral gastroenteritis secondary to Covid with mildly elevated liver 

enzymes


Hypothyroidism


History of liver transplant


Hypertension


History of CVA/TIA


Hyperlipidemia


Diabetes mellitus, on insulin

















Plan: 





This is a pleasant 76 years old female who presents with covid pneumonia


Continue with dexamethasone.  And insulin sliding scale


Continue with vitamin C, D and zinc


Pulmonary consult


Follow-up CT of the brain and neurology consult


Labs and medication were reviewed..  Continue same treatment.  Continue with 

symptomatic treatment.  Resume home medication.  Monitor lytes and vitals.  DVT 

and GI prophylaxis.  Further recommendations depends on the clinical course of 

the patient


DVT prophylaxis: Subcutaneous Lovenox


GI Prophylaxis: Pepcid


Prognosis is guarded

## 2021-11-23 NOTE — P.PN
Subjective


Progress Note Date: 11/23/21


The patient is seen at bedside and doing better.


Per patient nurse, she is confused and pulling her oxygen nasal canuli.


Her oxygen is as low as 83% at 5:30am.


Upon seeing the patient she was sitting at side of couch and had her oxygen on 

the floor and was coughing.








Objective





- Vital Signs


Vital signs: 


                                   Vital Signs











Temp  98.0 F   11/23/21 14:00


 


Pulse  67   11/23/21 14:00


 


Resp  18   11/23/21 14:00


 


BP  138/81   11/23/21 14:00


 


Pulse Ox  91 L  11/23/21 14:00








                                 Intake & Output











 11/22/21 11/23/21 11/23/21





 18:59 06:59 18:59


 


Intake Total  160 


 


Balance  160 


 


Intake:   


 


  Oral  160 


 


Other:   


 


  Voiding Method  Toilet 


 


  # Voids 1 2 


 


  # Bowel Movements  1 














- Exam





GENERAL: The patient is lying in bed and does not seem in not in acute distress.


Respiratory: She kept on coughing.





NEUROLOGICAL:


Higher mental function: The patient is awake, alert, oriented to self and states

she in the hospital but does not which.  She could not tell me year.  Is able to

identify objects (pen, telephone and cup).  Patient is following simple 

commands.  No aphasia and no neglect.


Cranial nerves: The pupils are round, equal and reactive to light and 

accommodation.  Visual fields are full to confrontation throughout.  Extraocular

movement is intact no nystagmus is noted.  Facial sensation is normal to touch 

throughout.  The facial strength is normal throughout.  Hearing is moderately 

decreased bilaterally to hand rub.  Tongue is midline and moved side-to-side 

without any difficulty.  No dysarthria is noted.  Patient had frequent episodes 

of cough. Shoulder shrug is normal bilaterally.


Motor: The strength is moving all extremities above gravity without focality.   

Normal tone and bulk.  


Cerebellum: Normal finger to nose bilaterally.


Sensation: Sensation is normal to touch throughout.


Plantars are mute bilaterally. 





WORK-UP:


Vitamin B-12 is 900, serum folate is 197.


AST is a 48 and ALT 68


Ammonia is less than 9.


CT HEAD: Reported as cerebral atrophy.  No acute intracranial abnormality.  

Left-sided massive diabetes mellitus.  No change compared to old exam.











- Labs


CBC & Chem 7: 


                                 11/23/21 06:12





                                 11/23/21 06:12


Labs: 


                  Abnormal Lab Results - Last 24 Hours (Table)











  11/22/21 11/22/21 11/23/21 Range/Units





  17:07 20:57 06:12 


 


WBC     (4.50-10.00)  X 10*3/uL


 


Hgb     (12.0-15.0)  g/dL


 


Hct     (37.2-46.3)  %


 


MCV     (80.0-97.0)  fL


 


MCH     (27.0-32.0)  pg


 


RDW     (11.5-14.5)  %


 


Plt Count     (140-440)  X 10*3/uL


 


MPV     (9.5-12.2)  fL


 


Immature Gran #     (0.00-0.04)  X 10*3/uL


 


Neutrophils #     (1.80-7.70)  X 10*3/uL


 


Eosinophils #     (0.04-0.35)  X 10*3/uL


 


Sodium     (135-145)  mmol/L


 


Chloride     ()  mmol/L


 


Carbon Dioxide     (21.6-31.8)  mmol/L


 


Anion Gap     (4.00-12.00)  mmol/L


 


BUN     (9.0-27.0)  mg/dL


 


BUN/Creatinine Ratio     (12.00-20.00)  Ratio


 


Glucose     ()  mg/dL


 


POC Glucose (mg/dL)  360 H  318 H   (75-99)  mg/dL


 


AST     (13-35)  U/L


 


ALT     (8-44)  U/L


 


Lactate Dehydrogenase     (120-246)  U/L


 


C-Reactive Protein     (0.00-0.80)  mg/dL


 


Albumin/Globulin Ratio     (1.60-3.17)  g/dL


 


Procalcitonin    0.11 H  (0.02-0.09)  ng/mL














  11/23/21 11/23/21 11/23/21 Range/Units





  06:12 06:12 07:11 


 


WBC   10.42 H   (4.50-10.00)  X 10*3/uL


 


Hgb   11.4 L   (12.0-15.0)  g/dL


 


Hct   35.6 L   (37.2-46.3)  %


 


MCV   78.6 L   (80.0-97.0)  fL


 


MCH   25.2 L   (27.0-32.0)  pg


 


RDW   14.8 H   (11.5-14.5)  %


 


Plt Count   611 H   (140-440)  X 10*3/uL


 


MPV   9.3 L   (9.5-12.2)  fL


 


Immature Gran #   0.19 H   (0.00-0.04)  X 10*3/uL


 


Neutrophils #   8.35 H   (1.80-7.70)  X 10*3/uL


 


Eosinophils #   0.01 L   (0.04-0.35)  X 10*3/uL


 


Sodium  131 L    (135-145)  mmol/L


 


Chloride  94 L    ()  mmol/L


 


Carbon Dioxide  18.2 L    (21.6-31.8)  mmol/L


 


Anion Gap  18.80 H    (4.00-12.00)  mmol/L


 


BUN  30.7 H    (9.0-27.0)  mg/dL


 


BUN/Creatinine Ratio  34.11 H    (12.00-20.00)  Ratio


 


Glucose  158 H    ()  mg/dL


 


POC Glucose (mg/dL)    166 H  (75-99)  mg/dL


 


AST  48 H    (13-35)  U/L


 


ALT  68 H    (8-44)  U/L


 


Lactate Dehydrogenase  366 H    (120-246)  U/L


 


C-Reactive Protein  7.90 H    (0.00-0.80)  mg/dL


 


Albumin/Globulin Ratio  1.19 L    (1.60-3.17)  g/dL


 


Procalcitonin     (0.02-0.09)  ng/mL














  11/23/21 Range/Units





  11:39 


 


WBC   (4.50-10.00)  X 10*3/uL


 


Hgb   (12.0-15.0)  g/dL


 


Hct   (37.2-46.3)  %


 


MCV   (80.0-97.0)  fL


 


MCH   (27.0-32.0)  pg


 


RDW   (11.5-14.5)  %


 


Plt Count   (140-440)  X 10*3/uL


 


MPV   (9.5-12.2)  fL


 


Immature Gran #   (0.00-0.04)  X 10*3/uL


 


Neutrophils #   (1.80-7.70)  X 10*3/uL


 


Eosinophils #   (0.04-0.35)  X 10*3/uL


 


Sodium   (135-145)  mmol/L


 


Chloride   ()  mmol/L


 


Carbon Dioxide   (21.6-31.8)  mmol/L


 


Anion Gap   (4.00-12.00)  mmol/L


 


BUN   (9.0-27.0)  mg/dL


 


BUN/Creatinine Ratio   (12.00-20.00)  Ratio


 


Glucose   ()  mg/dL


 


POC Glucose (mg/dL)  141 H  (75-99)  mg/dL


 


AST   (13-35)  U/L


 


ALT   (8-44)  U/L


 


Lactate Dehydrogenase   (120-246)  U/L


 


C-Reactive Protein   (0.00-0.80)  mg/dL


 


Albumin/Globulin Ratio   (1.60-3.17)  g/dL


 


Procalcitonin   (0.02-0.09)  ng/mL














Assessment and Plan


Assessment: 





* Altered mental status due to multifactorial: Acute hypoxic encephalopathy 

  (keeps on taking her oxygen off) and component of metabolic encephalopathy 

  (sugar is range of 100-300) with mild elevated LFT's, hyponatremia and 

  medication effect (on steroids)---No focality on examination.


* Acute hypoxic respiratory failure related to COVID-19 pneumonia is on 

  cylcosporine and Mycophenolate and steroids


* Labile sugar


* Mild hepatitis


* History of stroke about 8 years ago without any residual deficit


* History of vertigo status post tympanostomy 


* Hypertension


* Diabetes mellitus


* Hyperlipidemia


* Hypothyroidism


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 





Plan: 





Ordered routine EEG.  I'll not start the patient on an antiepileptic drug unless

there is epileptiform discharges or seizure on the EEG.


Her last TSH level was on 07/29/2021 and it was 1.170 and a free T4 was 1.33 

therefore I would not repeat the test


Patient last hemoglobin A1c was 6.5 on 05/11/2021.


Continue every 4 hours neuro checks


Recommend a sitter since patient keep on taking her oxygen off if possible.


Pulmonary team is on board


Defer the rest of medical management to primary team





The plan is discussed with the patient's nurse.





Anthony Lorenzana MD


Neuro-Hospitalist








Time with Patient: Less than 30

## 2021-11-24 LAB
GLUCOSE BLD-MCNC: 109 MG/DL (ref 75–99)
GLUCOSE BLD-MCNC: 224 MG/DL (ref 75–99)
GLUCOSE BLD-MCNC: 258 MG/DL (ref 75–99)
GLUCOSE BLD-MCNC: 317 MG/DL (ref 75–99)

## 2021-11-24 RX ADMIN — INSULIN ASPART SCH UNIT: 100 INJECTION, SOLUTION INTRAVENOUS; SUBCUTANEOUS at 17:16

## 2021-11-24 RX ADMIN — Medication SCH MCG: at 07:43

## 2021-11-24 RX ADMIN — METOPROLOL TARTRATE SCH MG: 50 TABLET, FILM COATED ORAL at 07:43

## 2021-11-24 RX ADMIN — ASPIRIN SCH: 325 TABLET ORAL at 20:08

## 2021-11-24 RX ADMIN — MECLIZINE HYDROCHLORIDE SCH MG: 25 TABLET ORAL at 07:43

## 2021-11-24 RX ADMIN — INSULIN ASPART SCH UNIT: 100 INJECTION, SOLUTION INTRAVENOUS; SUBCUTANEOUS at 12:27

## 2021-11-24 RX ADMIN — LEVOTHYROXINE SODIUM SCH MCG: 50 TABLET ORAL at 05:44

## 2021-11-24 RX ADMIN — INSULIN ASPART SCH UNIT: 100 INJECTION, SOLUTION INTRAVENOUS; SUBCUTANEOUS at 20:10

## 2021-11-24 RX ADMIN — INSULIN DETEMIR SCH UNIT: 100 INJECTION, SOLUTION SUBCUTANEOUS at 07:42

## 2021-11-24 RX ADMIN — MYCOPHENOLIC ACID SCH MG: 180 TABLET, DELAYED RELEASE ORAL at 12:28

## 2021-11-24 RX ADMIN — METOPROLOL TARTRATE SCH MG: 50 TABLET, FILM COATED ORAL at 20:10

## 2021-11-24 RX ADMIN — ENOXAPARIN SODIUM SCH MG: 40 INJECTION SUBCUTANEOUS at 07:42

## 2021-11-24 RX ADMIN — Medication SCH EACH: at 07:43

## 2021-11-24 RX ADMIN — INSULIN ASPART SCH: 100 INJECTION, SOLUTION INTRAVENOUS; SUBCUTANEOUS at 07:41

## 2021-11-24 RX ADMIN — FAMOTIDINE SCH MG: 20 TABLET, FILM COATED ORAL at 07:43

## 2021-11-24 RX ADMIN — MYCOPHENOLIC ACID SCH MG: 180 TABLET, DELAYED RELEASE ORAL at 21:40

## 2021-11-24 RX ADMIN — FAMOTIDINE SCH MG: 20 TABLET, FILM COATED ORAL at 20:10

## 2021-11-24 RX ADMIN — Medication SCH MG: at 07:43

## 2021-11-24 RX ADMIN — MECLIZINE HYDROCHLORIDE SCH MG: 25 TABLET ORAL at 20:10

## 2021-11-24 RX ADMIN — CLOPIDOGREL BISULFATE SCH MG: 75 TABLET ORAL at 07:43

## 2021-11-24 NOTE — P.PN
Subjective


Progress Note Date: 11/24/21


Principal diagnosis: 





COVID-19 pneumonia








 76-year-old white female patient with past medical history of a potential, 

previous history of CVA 8 years ago without residual deficits, liver 

transplantation for history of nonalcoholic steatohepatitis ([facility]) in [place],

lifetime nonsmoker and nondrinker, who presented to the emergency department on 

11/17/2021 with complaints of shortness of breath, fever, and cough.  Patient 

tested positive for COVID 19 one week ago at an urgent care clinic in [place].  She was started on antibiotics for pneumonia the form of doxycycline.  

However her symptom onset was 2 weeks ago and initially started with fatigue and

loss of appetite and progressed to shortness of breath, cough and fever.  

Patient is not vaccinated for COVID-19 related to patient's history of severe 

reaction to even oral contrast when she has to have a CT of the abdomen 

completed.  She is on 4 L of oxygen her pulse ox of 90-92%, her chest x-ray in 

emergency department shows interstitial prominence, asymmetric elevation of the 

right hemidiaphragm, no consolidation or pleural effusion.  Admission blood work

shows white blood cell, 4.5, hemoglobin of 10.5, platelet count is 261, 

lymphocyte count is 0.5, d-dimer is 1.2, INR is 1.0, sodium is 135, potassium is

4.7, CO2 17, BUN is 18, creatinine 0.71, magnesium is 1.2, AST is 83, ALT is 36,

alkaline phosphatase is 101, troponin is negative at 0.018, proBNP is 1860.  

Patient takes cyclosporine and mycophenilate for her history of liver 

transplant, she was started on Decadron 6 mg daily, she is on prophylactic dose 

Lovenox and COVID-19 vitamins.  She is breathing comfortably, has a dry 

nonproductive cough, she is afebrile.  She had a mild diarrhea and loss of 

appetite, but no vomiting or nausea.





On 11/19/2021 patient seen in follow-up on medical surgical floor, she is 

breathing comfortably, she is currently on 3 L of oxygen pulse ox is 90-95%, no 

acute events overnight, lower extremity Dopplers were negative for DVT in 

bilateral lower extremities, lung sounds reveal minimal crackles at the bases, 

no nausea vomiting no diarrhea, no acute events overnight, no fever or chills.





The patient is seen today 11/20/2021 in follow-up on the regular medical floor. 

She is currently sitting up in chair at the bedside.  Awake and alert in no 

acute distress.  She has had increasing shortness of breath.  She was 87% O2 

saturations on 5 L nasal cannula.  She's currently 95% on 6 L nasal cannula.  

She's been afebrile.  Hemodynamically stable.  Blood glucose 136.  She remains 

on Decadron, Lovenox, vitamin supplements.





The patient is seen today 11/21/2021 in follow-up on the regular medical floor. 

She is currently sitting up at the bedside.  Awake and alert in no acute 

distress.  Doing a bit better today compared to yesterday.  Her appetite is 

good.  She is on 8 L high flow nasal cannula maintaining O2 saturations in the 

low 90s.  She's been afebrile.  Hemodynamically stable.  Continues with coarse 

crackles in the posterior bases.  Chest x-ray, but continues to revealed 

bilateral left greater than right interstitial and patchy opacities.  White 

count 11.7.  Hemoglobin 10.3.  Lymphocytes 1.29.  D-dimer 1.62.  Sodium 1:30.  

Potassium 4.2.  Creatinine 0.8.  AST 88.  ALT 81.  .  C-reactive protein 

2.2.  She remains on Decadron, Lovenox, vitamin supplements.











The patient is seen today 11/22/2021 in follow-up on the regular medical floor. 

She is currently sitting up in a chair at the bedside.  Awake and alert in no 

acute distress.  She is currently on 8 L high flow nasal cannula to maintain O2 

saturations in the low 90s.  She is 84% on room air.  She has had some altered 

mental status and confusion.  Computed tomography scan of the brain reveals age-

related atrophic and chronic small vessel ischemic changes without acute 

intracranial process.  MRI of the brain revealed no evidence of recent infarct. 

There is mild diffuse age-related cerebral atrophy and mild to moderate chronic 

small vessel ischemic changes.  Glucose 224. She remains on Lovenox, Decadron, 

vitamin supplements. 





Objective





- Vital Signs


Vital signs: 


                                   Vital Signs











Temp  97.9 F   11/24/21 10:13


 


Pulse  73   11/24/21 10:13


 


Resp  19   11/24/21 10:13


 


BP  136/79   11/24/21 10:13


 


Pulse Ox  95   11/24/21 10:13








                                 Intake & Output











 11/23/21 11/24/21 11/24/21





 18:59 06:59 18:59


 


Intake Total  260 


 


Balance  260 


 


Weight 64.864 kg  


 


Intake:   


 


  Oral  260 


 


Other:   


 


  Voiding Method  Toilet Toilet


 


  # Voids  2 2














- Exam





GENERAL EXAM: Alert, slightly confused 76-year-old female patient, on 8 L nasal 

cannula, fairly comfortable in no apparent distress.


HEAD: Normocephalic.


EYES: Normal reaction of pupils, equal size.


NOSE: Clear with pink turbinates.


THROAT: No erythema or exudates.


NECK: No masses, no JVD.


CHEST: No chest wall deformity.


LUNGS: Equal air entry with coarse crackles in the bilateral bases.


CVS: S1 and S2 normal with no audible murmur, regular rhythm.


ABDOMEN: No hepatosplenomegaly, normal bowel sounds, no guarding or rigidity.


SPINE: No scoliosis or deformity


SKIN: No rashes


CENTRAL NERVOUS SYSTEM: No focal deficits, tone is normal in all 4 extremities.


EXTREMITIES: There is no peripheral edema.  No clubbing, no cyanosis.  

Peripheral pulses are intact.





- Labs


CBC & Chem 7: 


                                 11/23/21 06:12





                                 11/23/21 06:12


Labs: 


                  Abnormal Lab Results - Last 24 Hours (Table)











  11/23/21 11/23/21 11/24/21 Range/Units





  16:45 20:00 07:01 


 


POC Glucose (mg/dL)  199 H  130 H  109 H  (75-99)  mg/dL














  11/24/21 Range/Units





  12:13 


 


POC Glucose (mg/dL)  224 H  (75-99)  mg/dL














Assessment and Plan


Assessment: 





1  Acute hypoxic respiratory failure related to COVID-19 pneumonia with onset of

symptoms 2 weeks ago, patient is outside the window for Remdesivir, she has not 

been vaccinated





2  History of liver transplant by history of nonalcoholic steatohepatitis, she 

is on cyclosporine and Mycophenolate





3  Hypertension





4  Diabetes mellitus





5  Hyperlipidemia





6  Previous history of CVA with no residual neurologic deficits





7  Hypothyroidism





8  Lifetime nonsmoker





9 Episodes of confusion and altered mental status.  CT scans of the brain 

revealed no acute process.  MRI of the brain revealed no acute recent infarct.





Plan:





The patient was seen and evaluated by Dr. Lorenzana


Currently on 8 L nasal cannula


Titrate the FiO2 as tolerated


Follow-up chest x-ray in the a.m.


We'll continue to follow





I, the cosigning physician, performed a history & physical examination of the 

patient. Lungs sounds crackles in the posterior bases.  Maintaining good O2 

saturations in the 90s on 8 L/m per nasal cannula.  I discussed the assessment 

and plan of care with my nurse practitioner, Lydia Kapadia. I attest to the above 

note as dictated by her.t

## 2021-11-24 NOTE — MR
EXAMINATION TYPE: MR brain wo con

 

DATE OF EXAM: 11/24/2021

 

COMPARISON: CT brain from earlier today and older studies

 

HISTORY: AMS, Covid 19, Hypoxia.

 

TECHNIQUE: Multiplanar, multisequence imaging of the brain and brainstem is performed without IV cont
rast.

 

FINDINGS:  Exam suboptimal as patient unable to hold still. Significant motion artifact on T2 coronal
 images in particular

Diffusion weighted images demonstrate no evidence of a recent infarct or other diffusion abnormality.


 

There is mild ventricular and sulcal prominence. Confluent areas of T2 hyperintensity in the perivent
ricular white matter are present. There are some small scattered foci of T2 hyperintensity in the luc
p white matter bilaterally. Findings likely combination of products of chronic small vessel ischemic 
change and transependymal flow of CSF.

 

Midline structures demonstrate normal morphology.  The craniocervical junction appears within normal 
limits. Normal vascular flow voids are present. Dominant left vertebral artery is present. Abnormal f
luid signal in the left mastoid air cells is redemonstrated. Mild-to-moderate mucosal thickening in t
he inferior left maxillary sinus and mild mucosal thickening in ethmoid sinuses bilaterally. The glob
es are intact bilaterally.

 

IMPRESSION: No MRI evidence for a recent infarct. Mild diffuse age-related cerebral atrophy and mild 
to moderate chronic small vessel ischemic changes thought present.

## 2021-11-24 NOTE — P.PN
Subjective





This is a pleasant 76 results female with past medical history of 

hypothyroidism, liver transplant, CVA/TIA, hypertension, diabetes mellitus, 

hyperlipidemia


Patient presents because of difficulty breathing for about a week associated 

with cough and yellowish phlegm but no chest pain.


She had diarrhea like 3 times this morning.  She has diarrhea for 3 days.  No 

abdominal pain or vomiting.


She went to urgent care last Sunday about 3 days ago and they were diagnosed her

with pneumonia, next day her Covid test came back positive she denies history of

smoking, alcohol or illicit drugs.  She does not have history of COPD.  She is 

not on home oxygen





She is hypoxic at 83% on room air and tachypneic with a breathing rate 30-40, 

tachycardic 122.  She is afebrile at 97.8.


Labs unremarkable including CBC, INR, BMP.  Liver enzymes slightly elevated with

AST 83 and ALT 36.  Bilirubin is normal 1.0.


Chest x-ray: Bilateral infiltrate with interstitial prominence has increased





11/18/2021


Patient today is awake, she is slightly tachypneic while sitting in bed.  No 

significant dyspnea.  No significant coughing.  She denies chest pain.


She denies diarrhea.


She is saturating low 90s on 4 L oxygen via nasal cannula which is similar to 

yesterday.  Fever on admission 100.0.  Sided yesterday.


labs from today are still pending.


Sugar this morning to 30 patient is on insulin sliding scale


She remains on dexamethasone, and multiple vitamins.





11/19/2021


Patient breathing is stable and she is on 4 L/m of oxygen with good oxygen 

saturation.


Rest of vital signs stable, afebrile since admission when she had fever of 

100.2.  Labs are stable.


Leg ultrasound was negative.  She remains on dexamethasone vitamin C, D and 

zinc.


Possible discharge in 24-48 hours if she keeps improvement





11/20/2021


Patient today was L Delgadillo tired although she says she looks the same.  Her 

oxygen requirement went up to 6 L/m so we have to keep monitor the patient in 

the hospital


Blood pressure is slightly elevated while she is on steroids.  No more fevers 

since admission.  


She remains on dexamethasone and multiple vitamins and Lovenox





11/21/2021


Patient looks more tired, her breathing looks the same with dyspnea however her 

oxygen requirements increased to 8 L/m.


She has mild lab abnormality like mild increased liver enzymes, mild 

leukocytosis, mild hyponatremia.


Chest x-ray was showing slightly increased infiltrates


She remains on dexamethasone, Lovenox and multiple vitamins.


D-dimer is a stable 1.2-1.6





11/22/2021


Patient today was sensitive, children in bed while oxygen off, she wasn't as 

questions readily as of yesterday, altered mental status is suspected, CT of the

brain is requested as well as nephrology consult


Her oxygen requirement went up to 8 L/m.


She is afebrile and vitals are stable.


She still on dexamethasone, vitamin C, vitamin D and zinc.  Also she is on 

insulin and her Plavix.





11/23/2021


Patient still Confused at time, when I talked the patient should've very 

appropriate and oriented however better on per staff she started taking her 

oxygen off again.  Suggest saturation and requirements went up to 8 L/m again 

today.


She is mildly tachypneic but denies any specific symptoms like no chest pain.  

No dizziness.  No abdominal pain or diarrhea or vomiting.  No headache or 

difficulty moving extremities.


She is hemodynamically stable other than the hypoxia.  No more fever.


Her exam is only mildly elevated but ammonia is negative with less than 9.  WBC 

normal and stable around 10+.  Sodium stable 1:.  LDH slightly elevated at

366 and subjective 14 7.9.


She remains on dexamethasone, vitamin C, D and zinc.  Also her sugar is 

controlled on home dose of Levemir 40 units at bedtime.


She is also on Pepcid and Lovenox 





11/24/2021


Patient still feels generally weak, no specific complaint.  She is awake and 

alert, she is lethargic.  He was not complaining much from dyspnea while she is 

at rest.


She was on 8 L/m of oxygen today but her oxygen saturation was more than 95%-98%


MRI of the brain today came back with no infarct.  And patient actually looks 

fully awake and oriented she is just tired


Other than that she remains on the same treatment of dexamethasone and vitamins











Objective





- Vital Signs


Vital signs: 


                                   Vital Signs











Temp  97.9 F   11/24/21 10:13


 


Pulse  73   11/24/21 10:13


 


Resp  19   11/24/21 10:13


 


BP  136/79   11/24/21 10:13


 


Pulse Ox  95   11/24/21 10:13








                                 Intake & Output











 11/23/21 11/24/21 11/24/21





 18:59 06:59 18:59


 


Intake Total  260 


 


Balance  260 


 


Weight 64.864 kg  


 


Intake:   


 


  Oral  260 


 


Other:   


 


  Voiding Method  Toilet Toilet


 


  # Voids  2 2














- Exam





GENERAL: The patient is alert and oriented x3, not in any acute distress. Well 

developed, well nourished. 


HEENT: Pupils are round and equally reacting to light. EOMI. No scleral icterus.

No conjunctival pallor. Normocephalic, atraumatic. No pharyngeal erythema. No 

thyromegaly. 


CARDIOVASCULAR: S1 and S2 present. No murmurs, rubs, or gallops. 


PULMONARY: Chest is clear to auscultation, no wheezing or crackles. 


ABDOMEN: Soft, nontender, nondistended, normoactive bowel sounds. No palpable 

organomegaly. 


MUSCULOSKELETAL: No joint swelling or deformity. 


EXTREMITIES: No cyanosis, clubbing, or pedal edema. 


NEUROLOGICAL: Gross neurological examination did not reveal any focal deficits. 


SKIN: No rashes. No petechiae





- Labs


CBC & Chem 7: 


                                 11/23/21 06:12





                                 11/23/21 06:12


Labs: 


                  Abnormal Lab Results - Last 24 Hours (Table)











  11/23/21 11/23/21 11/24/21 Range/Units





  16:45 20:00 07:01 


 


POC Glucose (mg/dL)  199 H  130 H  109 H  (75-99)  mg/dL














  11/24/21 Range/Units





  12:13 


 


POC Glucose (mg/dL)  224 H  (75-99)  mg/dL














Assessment and Plan


Assessment: 








Bilateral acute covid pneumonia


Acute hypoxic respiratory failure


Possible viral gastroenteritis secondary to Covid with mildly elevated liver 

enzymes, improved


Hypothyroidism


History of liver transplant


Hypertension


History of CVA/TIA


Hyperlipidemia


Diabetes mellitus, on insulin

















Plan: 





This is a pleasant 76 years old female who presents with covid pneumonia


Continue with dexamethasone.  And insulin sliding scale


Continue with vitamin C, D and zinc


Pulmonary consult


Labs and medication were reviewed..  Continue same treatment.  Continue with 

symptomatic treatment.  Resume home medication.  Monitor lytes and vitals.  DVT 

and GI prophylaxis.  Further recommendations depends on the clinical course of 

the patient


DVT prophylaxis: Subcutaneous Lovenox


GI Prophylaxis: Pepcid


Prognosis is guarded

## 2021-11-24 NOTE — CT
EXAMINATION TYPE: CT brain wo con

 

DATE OF EXAM: 11/24/2021

 

COMPARISON: 11/22/2021

 

HISTORY: Confusion

 

CT DLP: 1159.4 mGycm

 

Unenhanced CT of the brain was performed.  

 

The ventricles, basal cisterns and sulci overlying the cerebral convexities demonstrate mild enlargem
ent. 

 

There is no evidence for intracranial hemorrhage or sulcal effacement.  

 

There is decreased attenuation about the periventricular white matter and deep white matter of both c
erebral hemispheres, compatible with chronic small vessel ischemia. Differential diagnosis does inclu
de demyelination. 

 

No mass effects are seen.No midline shift.

 

Osseous calvarium is intact.  

 

If symptoms persist consider MRI.  

 

IMPRESSION:

 

1. Age related atrophic and chronic small vessel ischemic change without acute intracranial process s
een at this time.

## 2021-11-24 NOTE — P.PN
Subjective


Progress Note Date: 11/24/21


The patient's nurse the patient continues to be confused and having staring 

episode and nonresponding but jerking of extremities.


Upon seeing her she was lying on her bed and coughing and felt she was about the

same.


Today her pulse oxygen was low as 84%








Objective





- Vital Signs


Vital signs: 


                                   Vital Signs











Temp  97.9 F   11/24/21 10:13


 


Pulse  73   11/24/21 10:13


 


Resp  19   11/24/21 10:13


 


BP  136/79   11/24/21 10:13


 


Pulse Ox  95   11/24/21 10:13








                                 Intake & Output











 11/23/21 11/24/21 11/24/21





 18:59 06:59 18:59


 


Intake Total  260 


 


Balance  260 


 


Weight 64.864 kg  


 


Intake:   


 


  Oral  260 


 


Other:   


 


  Voiding Method  Toilet Toilet


 


  # Voids  2 














- Exam





GENERAL: The patient is lying in bed and seems somewhat in respiratory distress.


Respiratory: She continues to cough.  She is not in labored breathing.





NEUROLOGICAL:


Higher mental function: The patient is awake, alert, oriented to self.  With 

options she correctly stated she was in the hospital but could not tell me name.

 She correctly stated the month but said the year is 2000.  She is able to name 

objects correctly (phone, cup and gloves).  Patient is following simple 

commands.  No aphasia and no neglect.


Cranial nerves: The pupils are round, equal and reactive to light.  Visual 

fields are full to confrontation throughout.  Extraocular movement is intact no 

nystagmus is noted.   The facial strength is normal throughout.  Hearing is 

moderately decreased bilaterally to hand rub.  Tongue is midline and moved 

side-to-side without any difficulty.  No dysarthria is noted.  Patient had 

frequent episodes of cough. 


Motor: The strength is moving all extremities above gravity without focality.   

Normal tone and bulk.  


Cerebellum: Normal finger to nose bilaterally.


Sensation: Sensation is normal to touch throughout.


Plantars are mute bilaterally. 





WORK-UP:


Vitamin B-12 is 900, serum folate is 19.70.


AST is a 48 and ALT 68


Ammonia is less than 9.


CT HEAD: Reported as cerebral atrophy.  No acute intracranial abnormality.  

Left-sided massive diabetes mellitus.  No change compared to old exam.











- Labs


CBC & Chem 7: 


                                 11/23/21 06:12





                                 11/23/21 06:12


Labs: 


                  Abnormal Lab Results - Last 24 Hours (Table)











  11/23/21 11/23/21 11/24/21 Range/Units





  16:45 20:00 07:01 


 


POC Glucose (mg/dL)  199 H  130 H  109 H  (75-99)  mg/dL














  11/24/21 Range/Units





  12:13 


 


POC Glucose (mg/dL)  224 H  (75-99)  mg/dL














Assessment and Plan


Assessment: 





* Altered mental status due to multifactorial: Acute hypoxic encephalopathy 

  (keeps on taking her oxygen off) and component of metabolic encephalopathy 

  (labile sugar, with hyponatremia and medication effect (on steroids)---No 

  focality on examination.  


* Acute hypoxic respiratory failure related to COVID-19 pneumonia is on cylcospo

  rine and Mycophenolate and steroids


* Labile sugar


* Mild hepatitis


* History of stroke about 8 years ago without any residual deficit


* History of vertigo status post tympanostomy 


* Hypertension


* Diabetes mellitus


* Hyperlipidemia


* Hypothyroidism


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 





Plan: 





* Ordered routine EEG.  But patient could not cooperate for the EEG so the EEG 

  was discontinued.  Patient does not have any clinical seizure-like activity 

  and my suspicion for seizures is extremely low.


* I got a repeat CT of the head that today and it's reported as age-related 

  atrophy and chronic small vessel ischemic change without acute intracranial 

  process at this time.  I personally could not review the CT of the head 

  because there is no images attached to the patient's file that can be 

  reviewed.


* I ordered MRI of the brain to rule out any underlying acute ischemic stroke 

  not picked up on CT of the head.  My suspicion for stroke is also extremely 

  low.


* Her last TSH level was on 07/29/2021 and it was 1.170 and a free T4 was 1.33 

  therefore I would not repeat the test


* Patient last hemoglobin A1c was 6.5 on 05/11/2021.


* Continue every 4 hours neuro checks


* Recommend a sitter since patient keep on taking her oxygen off if possible.


* Pulmonary team is on board


* Defer the rest of medical management to primary team





The plan is discussed with the patient's nurse.





UPDATE:


MR the brain is reported as no MRI evidence for recent infarct.  Mild diffuse 

age-related cerebral atrophy and mild to moderate chronic small vessel ischemic 

changes.  Personally reviewed the MRI the brain and I agree there is no acute or

subacute ischemia.





There is no further neurological workup.


Neurology will sign off.  Please reconsult if needed.





Anthony Lorenzana MD


Neuro-Hospitalist








Time with Patient: Less than 30

## 2021-11-25 LAB
GLUCOSE BLD-MCNC: 174 MG/DL (ref 75–99)
GLUCOSE BLD-MCNC: 260 MG/DL (ref 75–99)
GLUCOSE BLD-MCNC: 435 MG/DL (ref 75–99)
GLUCOSE BLD-MCNC: 93 MG/DL (ref 75–99)

## 2021-11-25 RX ADMIN — FAMOTIDINE SCH MG: 20 TABLET, FILM COATED ORAL at 21:44

## 2021-11-25 RX ADMIN — Medication SCH EACH: at 08:00

## 2021-11-25 RX ADMIN — MYCOPHENOLIC ACID SCH MG: 180 TABLET, DELAYED RELEASE ORAL at 21:44

## 2021-11-25 RX ADMIN — CLOPIDOGREL BISULFATE SCH MG: 75 TABLET ORAL at 08:00

## 2021-11-25 RX ADMIN — LEVOTHYROXINE SODIUM SCH MCG: 50 TABLET ORAL at 05:52

## 2021-11-25 RX ADMIN — MYCOPHENOLIC ACID SCH MG: 180 TABLET, DELAYED RELEASE ORAL at 08:00

## 2021-11-25 RX ADMIN — INSULIN ASPART SCH UNIT: 100 INJECTION, SOLUTION INTRAVENOUS; SUBCUTANEOUS at 12:39

## 2021-11-25 RX ADMIN — INSULIN DETEMIR SCH UNIT: 100 INJECTION, SOLUTION SUBCUTANEOUS at 08:00

## 2021-11-25 RX ADMIN — Medication SCH MG: at 08:00

## 2021-11-25 RX ADMIN — INSULIN ASPART SCH UNIT: 100 INJECTION, SOLUTION INTRAVENOUS; SUBCUTANEOUS at 17:49

## 2021-11-25 RX ADMIN — Medication SCH MCG: at 08:00

## 2021-11-25 RX ADMIN — METOPROLOL TARTRATE SCH MG: 50 TABLET, FILM COATED ORAL at 08:00

## 2021-11-25 RX ADMIN — ENOXAPARIN SODIUM SCH MG: 40 INJECTION SUBCUTANEOUS at 08:00

## 2021-11-25 RX ADMIN — MECLIZINE HYDROCHLORIDE SCH MG: 25 TABLET ORAL at 08:00

## 2021-11-25 RX ADMIN — INSULIN ASPART SCH: 100 INJECTION, SOLUTION INTRAVENOUS; SUBCUTANEOUS at 07:56

## 2021-11-25 RX ADMIN — MECLIZINE HYDROCHLORIDE SCH MG: 25 TABLET ORAL at 21:44

## 2021-11-25 RX ADMIN — METOPROLOL TARTRATE SCH MG: 50 TABLET, FILM COATED ORAL at 21:44

## 2021-11-25 RX ADMIN — FAMOTIDINE SCH MG: 20 TABLET, FILM COATED ORAL at 08:00

## 2021-11-25 RX ADMIN — ASPIRIN SCH: 325 TABLET ORAL at 21:43

## 2021-11-25 RX ADMIN — INSULIN ASPART SCH UNIT: 100 INJECTION, SOLUTION INTRAVENOUS; SUBCUTANEOUS at 21:44

## 2021-11-25 NOTE — P.PN
Subjective





This is a pleasant 76 results female with past medical history of 

hypothyroidism, liver transplant, CVA/TIA, hypertension, diabetes mellitus, 

hyperlipidemia


Patient presents because of difficulty breathing for about a week associated 

with cough and yellowish phlegm but no chest pain.


She had diarrhea like 3 times this morning.  She has diarrhea for 3 days.  No 

abdominal pain or vomiting.


She went to urgent care last Sunday about 3 days ago and they were diagnosed her

with pneumonia, next day her Covid test came back positive she denies history of

smoking, alcohol or illicit drugs.  She does not have history of COPD.  She is 

not on home oxygen





She is hypoxic at 83% on room air and tachypneic with a breathing rate 30-40, 

tachycardic 122.  She is afebrile at 97.8.


Labs unremarkable including CBC, INR, BMP.  Liver enzymes slightly elevated with

AST 83 and ALT 36.  Bilirubin is normal 1.0.


Chest x-ray: Bilateral infiltrate with interstitial prominence has increased





11/18/2021


Patient today is awake, she is slightly tachypneic while sitting in bed.  No 

significant dyspnea.  No significant coughing.  She denies chest pain.


She denies diarrhea.


She is saturating low 90s on 4 L oxygen via nasal cannula which is similar to 

yesterday.  Fever on admission 100.0.  Sided yesterday.


labs from today are still pending.


Sugar this morning to 30 patient is on insulin sliding scale


She remains on dexamethasone, and multiple vitamins.





11/19/2021


Patient breathing is stable and she is on 4 L/m of oxygen with good oxygen 

saturation.


Rest of vital signs stable, afebrile since admission when she had fever of 

100.2.  Labs are stable.


Leg ultrasound was negative.  She remains on dexamethasone vitamin C, D and 

zinc.


Possible discharge in 24-48 hours if she keeps improvement





11/20/2021


Patient today was L Delgadillo tired although she says she looks the same.  Her 

oxygen requirement went up to 6 L/m so we have to keep monitor the patient in 

the hospital


Blood pressure is slightly elevated while she is on steroids.  No more fevers 

since admission.  


She remains on dexamethasone and multiple vitamins and Lovenox





11/21/2021


Patient looks more tired, her breathing looks the same with dyspnea however her 

oxygen requirements increased to 8 L/m.


She has mild lab abnormality like mild increased liver enzymes, mild 

leukocytosis, mild hyponatremia.


Chest x-ray was showing slightly increased infiltrates


She remains on dexamethasone, Lovenox and multiple vitamins.


D-dimer is a stable 1.2-1.6





11/22/2021


Patient today was sensitive, children in bed while oxygen off, she wasn't as 

questions readily as of yesterday, altered mental status is suspected, CT of the

brain is requested as well as nephrology consult


Her oxygen requirement went up to 8 L/m.


She is afebrile and vitals are stable.


She still on dexamethasone, vitamin C, vitamin D and zinc.  Also she is on 

insulin and her Plavix.





11/23/2021


Patient still Confused at time, when I talked the patient should've very 

appropriate and oriented however better on per staff she started taking her 

oxygen off again.  Suggest saturation and requirements went up to 8 L/m again 

today.


She is mildly tachypneic but denies any specific symptoms like no chest pain.  

No dizziness.  No abdominal pain or diarrhea or vomiting.  No headache or 

difficulty moving extremities.


She is hemodynamically stable other than the hypoxia.  No more fever.


Her exam is only mildly elevated but ammonia is negative with less than 9.  WBC 

normal and stable around 10+.  Sodium stable 1:.  LDH slightly elevated at

366 and subjective 14 7.9.


She remains on dexamethasone, vitamin C, D and zinc.  Also her sugar is 

controlled on home dose of Levemir 40 units at bedtime.


She is also on Pepcid and Lovenox 





11/24/2021


Patient still feels generally weak, no specific complaint.  She is awake and 

alert, she is lethargic.  He was not complaining much from dyspnea while she is 

at rest.


She was on 8 L/m of oxygen today but her oxygen saturation was more than 95%-98%


MRI of the brain today came back with no infarct.  And patient actually looks 

fully awake and oriented she is just tired


Other than that she remains on the same treatment of dexamethasone and vitamins





11/25/2021


Patient is more up in bed today awake and alert, denies headache or weakness.  

An MRI of the brain came back negative and I discussed the case with neurology 

service.  Patient looks like does not have any altered mental status she has 

some mild metabolic encephalopathy probably from hypoxia and her Covid infection

but that's improving.  Also she is lethargic


She is on 6 L oxygen per minute today.  Repeat chest x-ray showed worsening 

bilateral opacity in the peripherally indicating worsening Covid pneumonia.


Remains on the same treatment of steroids, multiple vitamins, she is on 

dexamethasone 6 mg.


Repeat labs tomorrow morning








Objective





- Vital Signs


Vital signs: 


                                   Vital Signs











Temp  98.2 F   11/25/21 10:20


 


Pulse  60   11/25/21 10:20


 


Resp  22   11/25/21 10:20


 


BP  144/77   11/25/21 10:20


 


Pulse Ox  98   11/25/21 10:20








                                 Intake & Output











 11/24/21 11/25/21 11/25/21





 18:59 06:59 18:59


 


Other:   


 


  Voiding Method Toilet  


 


  # Voids 2 2 














- Exam





GENERAL: The patient is alert and oriented x3, not in any acute distress. Well 

developed, well nourished. 


HEENT: Pupils are round and equally reacting to light. EOMI. No scleral icterus.

No conjunctival pallor. Normocephalic, atraumatic. No pharyngeal erythema. No 

thyromegaly. 


CARDIOVASCULAR: S1 and S2 present. No murmurs, rubs, or gallops. 


PULMONARY: Chest is clear to auscultation, no wheezing or crackles. 


ABDOMEN: Soft, nontender, nondistended, normoactive bowel sounds. No palpable 

organomegaly. 


MUSCULOSKELETAL: No joint swelling or deformity. 


EXTREMITIES: No cyanosis, clubbing, or pedal edema. 


NEUROLOGICAL: Gross neurological examination did not reveal any focal deficits. 


SKIN: No rashes. No petechiae





- Labs


CBC & Chem 7: 


                                 11/23/21 06:12





                                 11/23/21 06:12


Labs: 


                  Abnormal Lab Results - Last 24 Hours (Table)











  11/24/21 11/24/21 11/25/21 Range/Units





  16:43 20:04 12:06 


 


POC Glucose (mg/dL)  258 H  317 H  174 H  (75-99)  mg/dL














Assessment and Plan


Assessment: 








Bilateral acute covid pneumonia


Acute hypoxic respiratory failure


Possible viral gastroenteritis secondary to Covid with mildly elevated liver 

enzymes, improved


Hypothyroidism


History of liver transplant


Hypertension


History of CVA/TIA


Hyperlipidemia


Diabetes mellitus, on insulin

















Plan: 





This is a pleasant 76 years old female who presents with covid pneumonia


Continue with dexamethasone.  And insulin sliding scale


Continue with vitamin C, D and zinc


Pulmonary consult


Labs and medication were reviewed..  Continue same treatment.  Continue with 

symptomatic treatment.  Resume home medication.  Monitor lytes and vitals.  DVT 

and GI prophylaxis.  Further recommendations depends on the clinical course of 

the patient


DVT prophylaxis: Subcutaneous Lovenox


GI Prophylaxis: Pepcid


Prognosis is guarded

## 2021-11-25 NOTE — P.PN
Subjective


Progress Note Date: 11/25/21


Principal diagnosis: 





COVID-19 pneumonia








 76-year-old white female patient with past medical history of a potential, 

previous history of CVA 8 years ago without residual deficits, liver 

transplantation for history of nonalcoholic steatohepatitis (HUTCHINSON) in [place],

lifetime nonsmoker and nondrinker, who presented to the emergency department on 

11/17/2021 with complaints of shortness of breath, fever, and cough.  Patient 

tested positive for COVID 19 one week ago at an urgent care clinic in [place].  She was started on antibiotics for pneumonia the form of doxycycline.  

However her symptom onset was 2 weeks ago and initially started with fatigue and

loss of appetite and progressed to shortness of breath, cough and fever.  

Patient is not vaccinated for COVID-19 related to patient's history of severe 

reaction to even oral contrast when she has to have a CT of the abdomen 

completed.  She is on 4 L of oxygen her pulse ox of 90-92%, her chest x-ray in 

emergency department shows interstitial prominence, asymmetric elevation of the 

right hemidiaphragm, no consolidation or pleural effusion.  Admission blood work

shows white blood cell, 4.5, hemoglobin of 10.5, platelet count is 261, 

lymphocyte count is 0.5, d-dimer is 1.2, INR is 1.0, sodium is 135, potassium is

4.7, CO2 17, BUN is 18, creatinine 0.71, magnesium is 1.2, AST is 83, ALT is 36,

alkaline phosphatase is 101, troponin is negative at 0.018, proBNP is 1860.  

Patient takes cyclosporine and mycophenilate for her history of liver 

transplant, she was started on Decadron 6 mg daily, she is on prophylactic dose 

Lovenox and COVID-19 vitamins.  She is breathing comfortably, has a dry 

nonproductive cough, she is afebrile.  She had a mild diarrhea and loss of 

appetite, but no vomiting or nausea.





On 11/19/2021 patient seen in follow-up on medical surgical floor, she is 

breathing comfortably, she is currently on 3 L of oxygen pulse ox is 90-95%, no 

acute events overnight, lower extremity Dopplers were negative for DVT in 

bilateral lower extremities, lung sounds reveal minimal crackles at the bases, 

no nausea vomiting no diarrhea, no acute events overnight, no fever or chills.





The patient is seen today 11/20/2021 in follow-up on the regular medical floor. 

She is currently sitting up in chair at the bedside.  Awake and alert in no 

acute distress.  She has had increasing shortness of breath.  She was 87% O2 

saturations on 5 L nasal cannula.  She's currently 95% on 6 L nasal cannula.  

She's been afebrile.  Hemodynamically stable.  Blood glucose 136.  She remains 

on Decadron, Lovenox, vitamin supplements.





The patient is seen today 11/21/2021 in follow-up on the regular medical floor. 

She is currently sitting up at the bedside.  Awake and alert in no acute 

distress.  Doing a bit better today compared to yesterday.  Her appetite is 

good.  She is on 8 L high flow nasal cannula maintaining O2 saturations in the 

low 90s.  She's been afebrile.  Hemodynamically stable.  Continues with coarse 

crackles in the posterior bases.  Chest x-ray, but continues to revealed 

bilateral left greater than right interstitial and patchy opacities.  White 

count 11.7.  Hemoglobin 10.3.  Lymphocytes 1.29.  D-dimer 1.62.  Sodium 1:30.  

Potassium 4.2.  Creatinine 0.8.  AST 88.  ALT 81.  .  C-reactive protein 

2.2.  She remains on Decadron, Lovenox, vitamin supplements.











The patient is seen today 11/24/2021 in follow-up on the regular medical floor. 

She is currently sitting up in a chair at the bedside.  Awake and alert in no 

acute distress.  She is currently on 8 L high flow nasal cannula to maintain O2 

saturations in the low 90s.  She is 84% on room air.  She has had some altered 

mental status and confusion.  Computed tomography scan of the brain reveals age-

related atrophic and chronic small vessel ischemic changes without acute 

intracranial process.  MRI of the brain revealed no evidence of recent infarct. 

There is mild diffuse age-related cerebral atrophy and mild to moderate chronic 

small vessel ischemic changes.  Glucose 224. She remains on Lovenox, Decadron, 

vitamin supplements. 





The patient is seen today 11/25/2021 in follow-up on the regular medical floor. 

She is currently resting comfortably in bed.  Awake and alert in no acute 

distress.  Currently maintaining good O2 saturations in the upper 90s on 6 L 

high flow nasal cannula.  Chest x-ray continues to show bilateral multifocal 

opacities.  She's afebrile.  Hemodynamically stable.  Glucose 174.  She remains 

on Lovenox, Decadron, vitamin supplements.





Objective





- Vital Signs


Vital signs: 


                                   Vital Signs











Temp  98.1 F   11/25/21 14:00


 


Pulse  65   11/25/21 14:00


 


Resp  18   11/25/21 14:00


 


BP  132/71   11/25/21 14:00


 


Pulse Ox  98   11/25/21 14:00








                                 Intake & Output











 11/24/21 11/25/21 11/25/21





 18:59 06:59 18:59


 


Other:   


 


  Voiding Method Toilet  


 


  # Voids 2 2 














- Exam





GENERAL EXAM: Alert, slightly confused 76-year-old female patient, on 6 L nasal 

cannula, fairly comfortable in no apparent distress.


HEAD: Normocephalic.


EYES: Normal reaction of pupils, equal size.


NOSE: Clear with pink turbinates.


THROAT: No erythema or exudates.


NECK: No masses, no JVD.


CHEST: No chest wall deformity.


LUNGS: Equal air entry with coarse crackles in the bilateral bases.


CVS: S1 and S2 normal with no audible murmur, regular rhythm.


ABDOMEN: No hepatosplenomegaly, normal bowel sounds, no guarding or rigidity.


SPINE: No scoliosis or deformity


SKIN: No rashes


CENTRAL NERVOUS SYSTEM: No focal deficits, tone is normal in all 4 extremities.


EXTREMITIES: There is no peripheral edema.  No clubbing, no cyanosis.  

Peripheral pulses are intact.





- Labs


CBC & Chem 7: 


                                 11/23/21 06:12





                                 11/23/21 06:12


Labs: 


                  Abnormal Lab Results - Last 24 Hours (Table)











  11/24/21 11/24/21 11/25/21 Range/Units





  16:43 20:04 12:06 


 


POC Glucose (mg/dL)  258 H  317 H  174 H  (75-99)  mg/dL














Assessment and Plan


Assessment: 





1  Acute hypoxic respiratory failure related to COVID-19 pneumonia with onset of

symptoms 2 weeks ago, patient is outside the window for Remdesivir, she has not 

been vaccinated





2  History of liver transplant by history of nonalcoholic steatohepatitis, she 

is on cyclosporine and Mycophenolate





3  Hypertension





4  Diabetes mellitus





5  Hyperlipidemia





6  Previous history of CVA with no residual neurologic deficits





7  Hypothyroidism





8  Lifetime nonsmoker





9 Episodes of confusion and altered mental status.  CT scans of the brain 

revealed no acute process.  MRI of the brain revealed no acute recent infarct.





Plan:





The patient was seen and evaluated by Dr. Lorenzana


Chest x-ray reviewed


Currently on 6 L nasal cannula


Titrate the FiO2 as tolerated


We'll continue to follow





I, the cosigning physician, performed a history & physical examination of the 

patient. Lungs sounds crackles in the posterior bases.  Maintaining good O2 

saturations in the 90s on 6 L/m per nasal cannula.  I discussed the assessment 

and plan of care with my nurse practitioner, Lydia Kapadia. I attest to the above 

note as dictated by her.t

## 2021-11-25 NOTE — XR
EXAMINATION TYPE: XR chest 1V portable

 

DATE OF EXAM: 11/25/2021

 

CLINICAL HISTORY: Difficulty breathing and covid pneumonia progress study.  

 

TECHNIQUE: Single AP portable upright view of the chest is obtained.

 

COMPARISON: Chest x-ray from 4 days earlier.

 

FINDINGS:  Diminished inspiration with elevated right hemidiaphragm. Worsening bilateral multifocal o
pacities greatest in the periphery. Cardiac silhouette size stable and upper limits of normal. Osseou
s structures are intact.

 

IMPRESSION: Diminished inspiration with worsening bilateral multifocal opacities greatest in the leigh
phery suggesting worsening covid-19 infection progression.

## 2021-11-26 LAB
ALBUMIN SERPL-MCNC: 3.2 G/DL (ref 3.8–4.9)
ALBUMIN/GLOB SERPL: 1.11 G/DL (ref 1.6–3.17)
ALP SERPL-CCNC: 76 U/L (ref 41–126)
ALT SERPL-CCNC: 50 U/L (ref 8–44)
ANION GAP SERPL CALC-SCNC: 13.9 MMOL/L (ref 10–18)
AST SERPL-CCNC: 35 U/L (ref 13–35)
BASOPHILS # BLD AUTO: 0.01 X 10*3/UL (ref 0–0.1)
BASOPHILS NFR BLD AUTO: 0.1 %
BUN SERPL-SCNC: 22.4 MG/DL (ref 9–27)
BUN/CREAT SERPL: 26.14 RATIO (ref 12–20)
CALCIUM SPEC-MCNC: 8.9 MG/DL (ref 8.7–10.3)
CHLORIDE SERPL-SCNC: 95 MMOL/L (ref 96–109)
CO2 SERPL-SCNC: 21.7 MMOL/L (ref 20–27.5)
EOSINOPHIL # BLD AUTO: 0.03 X 10*3/UL (ref 0.04–0.35)
EOSINOPHIL NFR BLD AUTO: 0.3 %
ERYTHROCYTE [DISTWIDTH] IN BLOOD BY AUTOMATED COUNT: 4.1 X 10*6/UL (ref 4.1–5.2)
ERYTHROCYTE [DISTWIDTH] IN BLOOD: 14.6 % (ref 11.5–14.5)
GLOBULIN SER CALC-MCNC: 2.9 G/DL (ref 1.6–3.3)
GLUCOSE BLD-MCNC: 193 MG/DL (ref 75–99)
GLUCOSE BLD-MCNC: 284 MG/DL (ref 75–99)
GLUCOSE BLD-MCNC: 363 MG/DL (ref 75–99)
GLUCOSE BLD-MCNC: 76 MG/DL (ref 75–99)
GLUCOSE SERPL-MCNC: 69 MG/DL (ref 70–110)
HCT VFR BLD AUTO: 32.5 % (ref 37.2–46.3)
HGB BLD-MCNC: 10.3 G/DL (ref 12–15)
LDH SPEC-CCNC: 189 U/L (ref 120–246)
LYMPHOCYTES # SPEC AUTO: 1.11 X 10*3/UL (ref 0.9–5)
LYMPHOCYTES NFR SPEC AUTO: 10.8 %
MCH RBC QN AUTO: 25.1 PG (ref 27–32)
MCHC RBC AUTO-ENTMCNC: 31.7 G/DL (ref 32–37)
MCV RBC AUTO: 79.3 FL (ref 80–97)
MONOCYTES # BLD AUTO: 1.21 X 10*3/UL (ref 0.2–1)
MONOCYTES NFR BLD AUTO: 11.7 %
NEUTROPHILS # BLD AUTO: 7.77 X 10*3/UL (ref 1.8–7.7)
NEUTROPHILS NFR BLD AUTO: 75.4 %
PLATELET # BLD AUTO: 602 X 10*3/UL (ref 140–440)
POTASSIUM SERPL-SCNC: 4.4 MMOL/L (ref 3.5–5.5)
PROT SERPL-MCNC: 6.1 G/DL (ref 6.2–8.2)
SODIUM SERPL-SCNC: 131 MMOL/L (ref 135–145)
WBC # BLD AUTO: 10.31 X 10*3/UL (ref 4.5–10)

## 2021-11-26 RX ADMIN — CLOPIDOGREL BISULFATE SCH MG: 75 TABLET ORAL at 08:27

## 2021-11-26 RX ADMIN — FAMOTIDINE SCH MG: 20 TABLET, FILM COATED ORAL at 21:40

## 2021-11-26 RX ADMIN — FAMOTIDINE SCH MG: 20 TABLET, FILM COATED ORAL at 08:27

## 2021-11-26 RX ADMIN — METOPROLOL TARTRATE SCH MG: 50 TABLET, FILM COATED ORAL at 21:41

## 2021-11-26 RX ADMIN — MYCOPHENOLIC ACID SCH MG: 180 TABLET, DELAYED RELEASE ORAL at 21:40

## 2021-11-26 RX ADMIN — INSULIN ASPART SCH UNIT: 100 INJECTION, SOLUTION INTRAVENOUS; SUBCUTANEOUS at 12:29

## 2021-11-26 RX ADMIN — ASPIRIN SCH: 325 TABLET ORAL at 21:39

## 2021-11-26 RX ADMIN — ENOXAPARIN SODIUM SCH MG: 40 INJECTION SUBCUTANEOUS at 08:27

## 2021-11-26 RX ADMIN — Medication SCH EACH: at 08:27

## 2021-11-26 RX ADMIN — METOPROLOL TARTRATE SCH MG: 50 TABLET, FILM COATED ORAL at 08:27

## 2021-11-26 RX ADMIN — INSULIN ASPART SCH UNIT: 100 INJECTION, SOLUTION INTRAVENOUS; SUBCUTANEOUS at 17:18

## 2021-11-26 RX ADMIN — INSULIN DETEMIR SCH: 100 INJECTION, SOLUTION SUBCUTANEOUS at 07:28

## 2021-11-26 RX ADMIN — LEVOTHYROXINE SODIUM SCH MCG: 50 TABLET ORAL at 05:33

## 2021-11-26 RX ADMIN — Medication SCH MCG: at 08:27

## 2021-11-26 RX ADMIN — MECLIZINE HYDROCHLORIDE SCH MG: 25 TABLET ORAL at 08:27

## 2021-11-26 RX ADMIN — INSULIN ASPART SCH UNIT: 100 INJECTION, SOLUTION INTRAVENOUS; SUBCUTANEOUS at 21:41

## 2021-11-26 RX ADMIN — MECLIZINE HYDROCHLORIDE SCH MG: 25 TABLET ORAL at 21:41

## 2021-11-26 RX ADMIN — MYCOPHENOLIC ACID SCH MG: 180 TABLET, DELAYED RELEASE ORAL at 08:28

## 2021-11-26 RX ADMIN — Medication SCH MG: at 08:27

## 2021-11-26 RX ADMIN — INSULIN ASPART SCH: 100 INJECTION, SOLUTION INTRAVENOUS; SUBCUTANEOUS at 07:29

## 2021-11-26 NOTE — P.PN
Subjective


Progress Note Date: 11/26/21


Principal diagnosis: 





COVID-19 pneumonia








 76-year-old white female patient with past medical history of a potential, 

previous history of CVA 8 years ago without residual deficits, liver 

transplantation for history of nonalcoholic steatohepatitis (HUTCHINSON) in [place],

lifetime nonsmoker and nondrinker, who presented to the emergency department on 

11/17/2021 with complaints of shortness of breath, fever, and cough.  Patient 

tested positive for COVID 19 one week ago at an urgent care clinic in [place].  She was started on antibiotics for pneumonia the form of doxycycline.  

However her symptom onset was 2 weeks ago and initially started with fatigue and

loss of appetite and progressed to shortness of breath, cough and fever.  

Patient is not vaccinated for COVID-19 related to patient's history of severe 

reaction to even oral contrast when she has to have a CT of the abdomen 

completed.  She is on 4 L of oxygen her pulse ox of 90-92%, her chest x-ray in 

emergency department shows interstitial prominence, asymmetric elevation of the 

right hemidiaphragm, no consolidation or pleural effusion.  Admission blood work

shows white blood cell, 4.5, hemoglobin of 10.5, platelet count is 261, 

lymphocyte count is 0.5, d-dimer is 1.2, INR is 1.0, sodium is 135, potassium is

4.7, CO2 17, BUN is 18, creatinine 0.71, magnesium is 1.2, AST is 83, ALT is 36,

alkaline phosphatase is 101, troponin is negative at 0.018, proBNP is 1860.  

Patient takes cyclosporine and mycophenilate for her history of liver 

transplant, she was started on Decadron 6 mg daily, she is on prophylactic dose 

Lovenox and COVID-19 vitamins.  She is breathing comfortably, has a dry 

nonproductive cough, she is afebrile.  She had a mild diarrhea and loss of 

appetite, but no vomiting or nausea.





On 11/19/2021 patient seen in follow-up on medical surgical floor, she is 

breathing comfortably, she is currently on 3 L of oxygen pulse ox is 90-95%, no 

acute events overnight, lower extremity Dopplers were negative for DVT in 

bilateral lower extremities, lung sounds reveal minimal crackles at the bases, 

no nausea vomiting no diarrhea, no acute events overnight, no fever or chills.





The patient is seen today 11/20/2021 in follow-up on the regular medical floor. 

She is currently sitting up in chair at the bedside.  Awake and alert in no 

acute distress.  She has had increasing shortness of breath.  She was 87% O2 

saturations on 5 L nasal cannula.  She's currently 95% on 6 L nasal cannula.  

She's been afebrile.  Hemodynamically stable.  Blood glucose 136.  She remains 

on Decadron, Lovenox, vitamin supplements.





The patient is seen today 11/21/2021 in follow-up on the regular medical floor. 

She is currently sitting up at the bedside.  Awake and alert in no acute 

distress.  Doing a bit better today compared to yesterday.  Her appetite is 

good.  She is on 8 L high flow nasal cannula maintaining O2 saturations in the 

low 90s.  She's been afebrile.  Hemodynamically stable.  Continues with coarse 

crackles in the posterior bases.  Chest x-ray, but continues to revealed 

bilateral left greater than right interstitial and patchy opacities.  White 

count 11.7.  Hemoglobin 10.3.  Lymphocytes 1.29.  D-dimer 1.62.  Sodium 1:30.  

Potassium 4.2.  Creatinine 0.8.  AST 88.  ALT 81.  .  C-reactive protein 

2.2.  She remains on Decadron, Lovenox, vitamin supplements.











The patient is seen today 11/24/2021 in follow-up on the regular medical floor. 

She is currently sitting up in a chair at the bedside.  Awake and alert in no 

acute distress.  She is currently on 8 L high flow nasal cannula to maintain O2 

saturations in the low 90s.  She is 84% on room air.  She has had some altered 

mental status and confusion.  Computed tomography scan of the brain reveals age-

related atrophic and chronic small vessel ischemic changes without acute 

intracranial process.  MRI of the brain revealed no evidence of recent infarct. 

There is mild diffuse age-related cerebral atrophy and mild to moderate chronic 

small vessel ischemic changes.  Glucose 224. She remains on Lovenox, Decadron, 

vitamin supplements. 





The patient is seen today 11/25/2021 in follow-up on the regular medical floor. 

She is currently resting comfortably in bed.  Awake and alert in no acute 

distress.  Currently maintaining good O2 saturations in the upper 90s on 6 L 

high flow nasal cannula.  Chest x-ray continues to show bilateral multifocal 

opacities.  She's afebrile.  Hemodynamically stable.  Glucose 174.  She remains 

on Lovenox, Decadron, vitamin supplements.





The patient is seen today 11/26/2021 in follow-up on the regular medical floor. 

She is more awake and alert today.  More cooperative.  Maintaining O2 saturatio

ns in the 90s on 4 L/m per nasal cannula.  White count 10.3.  Hemoglobin 10.3.  

Leukocytes 1.11.  D-dimer 122.  Sodium 131.  Potassium 4.4.  Creatinine 0.9.  

.  C-reactive protein 2.8.  Pro-calcitonin 0.08.  She remains on Lovenox,

Decadron, vitamin supplements.





Objective





- Vital Signs


Vital signs: 


                                   Vital Signs











Temp  98.5 F   11/26/21 17:05


 


Pulse  66   11/26/21 17:05


 


Resp  17   11/26/21 17:05


 


BP  124/79   11/26/21 17:05


 


Pulse Ox  97   11/26/21 17:05








                                 Intake & Output











 11/26/21 11/26/21 11/27/21





 06:59 18:59 06:59


 


Intake Total  540 


 


Balance  540 


 


Intake:   


 


  Oral  540 


 


Other:   


 


  Voiding Method Toilet Toilet 


 


  # Voids 1 2 














- Exam





GENERAL EXAM: Alert, cooperative 76-year-old female patient, on 4 L nasal 

cannula, fairly comfortable in no apparent distress.


HEAD: Normocephalic.


EYES: Normal reaction of pupils, equal size.


NOSE: Clear with pink turbinates.


THROAT: No erythema or exudates.


NECK: No masses, no JVD.


CHEST: No chest wall deformity.


LUNGS: Equal air entry with coarse crackles in the bilateral bases.


CVS: S1 and S2 normal with no audible murmur, regular rhythm.


ABDOMEN: No hepatosplenomegaly, normal bowel sounds, no guarding or rigidity.


SPINE: No scoliosis or deformity


SKIN: No rashes


CENTRAL NERVOUS SYSTEM: No focal deficits, tone is normal in all 4 extremities.


EXTREMITIES: There is no peripheral edema.  No clubbing, no cyanosis.  P

eripheral pulses are intact.





- Labs


CBC & Chem 7: 


                                 11/26/21 06:46





                                 11/26/21 06:46


Labs: 


                  Abnormal Lab Results - Last 24 Hours (Table)











  11/25/21 11/26/21 11/26/21 Range/Units





  20:36 06:46 06:46 


 


WBC   10.31 H   (4.50-10.00)  X 10*3/uL


 


Hgb   10.3 L   (12.0-15.0)  g/dL


 


Hct   32.5 L   (37.2-46.3)  %


 


MCV   79.3 L   (80.0-97.0)  fL


 


MCH   25.1 L   (27.0-32.0)  pg


 


MCHC   31.7 L   (32.0-37.0)  g/dL


 


RDW   14.6 H   (11.5-14.5)  %


 


Plt Count   602 H   (140-440)  X 10*3/uL


 


MPV   8.9 L   (9.5-12.2)  fL


 


Immature Gran #   0.18 H   (0.00-0.04)  X 10*3/uL


 


Neutrophils #   7.77 H   (1.80-7.70)  X 10*3/uL


 


Monocytes #   1.21 H   (0.20-1.00)  X 10*3/uL


 


Eosinophils #   0.03 L   (0.04-0.35)  X 10*3/uL


 


D-Dimer    1.22 H  (<0.60)  mg/L FEU


 


Sodium     (135-145)  mmol/L


 


Chloride     ()  mmol/L


 


BUN/Creatinine Ratio     (12.00-20.00)  Ratio


 


Glucose     ()  mg/dL


 


POC Glucose (mg/dL)  260 H    (75-99)  mg/dL


 


ALT     (8-44)  U/L


 


C-Reactive Protein     (0.00-0.80)  mg/dL


 


Total Protein     (6.2-8.2)  g/dL


 


Albumin     (3.8-4.9)  g/dL


 


Albumin/Globulin Ratio     (1.60-3.17)  g/dL














  11/26/21 11/26/21 11/26/21 Range/Units





  06:46 11:56 16:49 


 


WBC     (4.50-10.00)  X 10*3/uL


 


Hgb     (12.0-15.0)  g/dL


 


Hct     (37.2-46.3)  %


 


MCV     (80.0-97.0)  fL


 


MCH     (27.0-32.0)  pg


 


MCHC     (32.0-37.0)  g/dL


 


RDW     (11.5-14.5)  %


 


Plt Count     (140-440)  X 10*3/uL


 


MPV     (9.5-12.2)  fL


 


Immature Gran #     (0.00-0.04)  X 10*3/uL


 


Neutrophils #     (1.80-7.70)  X 10*3/uL


 


Monocytes #     (0.20-1.00)  X 10*3/uL


 


Eosinophils #     (0.04-0.35)  X 10*3/uL


 


D-Dimer     (<0.60)  mg/L FEU


 


Sodium  131 L    (135-145)  mmol/L


 


Chloride  95 L    ()  mmol/L


 


BUN/Creatinine Ratio  26.14 H    (12.00-20.00)  Ratio


 


Glucose  69 L    ()  mg/dL


 


POC Glucose (mg/dL)   193 H  363 H  (75-99)  mg/dL


 


ALT  50 H    (8-44)  U/L


 


C-Reactive Protein  2.80 H    (0.00-0.80)  mg/dL


 


Total Protein  6.1 L    (6.2-8.2)  g/dL


 


Albumin  3.2 L    (3.8-4.9)  g/dL


 


Albumin/Globulin Ratio  1.11 L    (1.60-3.17)  g/dL














Assessment and Plan


Assessment: 





1  Acute hypoxic respiratory failure related to COVID-19 pneumonia with onset of

symptoms 2 weeks ago, patient is outside the window for Remdesivir, she has not 

been vaccinated





2  History of liver transplant by history of nonalcoholic steatohepatitis, she 

is on cyclosporine and Mycophenolate





3  Hypertension





4  Diabetes mellitus





5  Hyperlipidemia





6  Previous history of CVA with no residual neurologic deficits





7  Hypothyroidism





8  Lifetime nonsmoker





9 Episodes of confusion and altered mental status.  CT scans of the brain 

revealed no acute process.  MRI of the brain revealed no acute recent infarct.





Plan:





The patient was seen and evaluated by Dr. Lorenzana


Currently on 4 L nasal cannula


Titrate the FiO2 as tolerated


Possibly home in the a.m. with oxygen


We'll continue to follow





I, the cosigning physician, performed a history & physical examination of the 

patient. Lungs sounds crackles in the posterior bases.  Maintaining good O2 

saturations in the 90s on 4 L/m per nasal cannula.  I discussed the assessment 

and plan of care with my nurse practitioner, Lydia Kapadia. I attest to the above 

note as dictated by her.t

## 2021-11-26 NOTE — P.PN
Subjective





This is a pleasant 76 results female with past medical history of 

hypothyroidism, liver transplant, CVA/TIA, hypertension, diabetes mellitus, 

hyperlipidemia


Patient presents because of difficulty breathing for about a week associated 

with cough and yellowish phlegm but no chest pain.


She had diarrhea like 3 times this morning.  She has diarrhea for 3 days.  No 

abdominal pain or vomiting.


She went to urgent care last Sunday about 3 days ago and they were diagnosed her

with pneumonia, next day her Covid test came back positive she denies history of

smoking, alcohol or illicit drugs.  She does not have history of COPD.  She is 

not on home oxygen





She is hypoxic at 83% on room air and tachypneic with a breathing rate 30-40, 

tachycardic 122.  She is afebrile at 97.8.


Labs unremarkable including CBC, INR, BMP.  Liver enzymes slightly elevated with

AST 83 and ALT 36.  Bilirubin is normal 1.0.


Chest x-ray: Bilateral infiltrate with interstitial prominence has increased





11/18/2021


Patient today is awake, she is slightly tachypneic while sitting in bed.  No 

significant dyspnea.  No significant coughing.  She denies chest pain.


She denies diarrhea.


She is saturating low 90s on 4 L oxygen via nasal cannula which is similar to 

yesterday.  Fever on admission 100.0.  Sided yesterday.


labs from today are still pending.


Sugar this morning to 30 patient is on insulin sliding scale


She remains on dexamethasone, and multiple vitamins.





11/19/2021


Patient breathing is stable and she is on 4 L/m of oxygen with good oxygen 

saturation.


Rest of vital signs stable, afebrile since admission when she had fever of 

100.2.  Labs are stable.


Leg ultrasound was negative.  She remains on dexamethasone vitamin C, D and 

zinc.


Possible discharge in 24-48 hours if she keeps improvement





11/20/2021


Patient today was L Delgadillo tired although she says she looks the same.  Her 

oxygen requirement went up to 6 L/m so we have to keep monitor the patient in 

the hospital


Blood pressure is slightly elevated while she is on steroids.  No more fevers 

since admission.  


She remains on dexamethasone and multiple vitamins and Lovenox





11/21/2021


Patient looks more tired, her breathing looks the same with dyspnea however her 

oxygen requirements increased to 8 L/m.


She has mild lab abnormality like mild increased liver enzymes, mild 

leukocytosis, mild hyponatremia.


Chest x-ray was showing slightly increased infiltrates


She remains on dexamethasone, Lovenox and multiple vitamins.


D-dimer is a stable 1.2-1.6





11/22/2021


Patient today was sensitive, children in bed while oxygen off, she wasn't as 

questions readily as of yesterday, altered mental status is suspected, CT of the

brain is requested as well as nephrology consult


Her oxygen requirement went up to 8 L/m.


She is afebrile and vitals are stable.


She still on dexamethasone, vitamin C, vitamin D and zinc.  Also she is on 

insulin and her Plavix.





11/23/2021


Patient still Confused at time, when I talked the patient should've very 

appropriate and oriented however better on per staff she started taking her 

oxygen off again.  Suggest saturation and requirements went up to 8 L/m again 

today.


She is mildly tachypneic but denies any specific symptoms like no chest pain.  

No dizziness.  No abdominal pain or diarrhea or vomiting.  No headache or 

difficulty moving extremities.


She is hemodynamically stable other than the hypoxia.  No more fever.


Her exam is only mildly elevated but ammonia is negative with less than 9.  WBC 

normal and stable around 10+.  Sodium stable 1:.  LDH slightly elevated at

366 and subjective 14 7.9.


She remains on dexamethasone, vitamin C, D and zinc.  Also her sugar is 

controlled on home dose of Levemir 40 units at bedtime.


She is also on Pepcid and Lovenox 





11/24/2021


Patient still feels generally weak, no specific complaint.  She is awake and 

alert, she is lethargic.  He was not complaining much from dyspnea while she is 

at rest.


She was on 8 L/m of oxygen today but her oxygen saturation was more than 95%-98%


MRI of the brain today came back with no infarct.  And patient actually looks 

fully awake and oriented she is just tired


Other than that she remains on the same treatment of dexamethasone and vitamins





11/25/2021


Patient is more up in bed today awake and alert, denies headache or weakness.  

An MRI of the brain came back negative and I discussed the case with neurology 

service.  Patient looks like does not have any altered mental status she has 

some mild metabolic encephalopathy probably from hypoxia and her Covid infection

but that's improving.  Also she is lethargic


She is on 6 L oxygen per minute today.  Repeat chest x-ray showed worsening 

bilateral opacity in the peripherally indicating worsening Covid pneumonia.


Remains on the same treatment of steroids, multiple vitamins, she is on 

dexamethasone 6 mg.


Repeat labs tomorrow morning





11/26/2021





Patient awake and alert, no confusion.  Patient is back to her normal mentation.

 No significant tachypnea or dyspnea but she still on 60 to per minute of oxygen

with saturation of 98%


Blood pressure is controlled.  Afebrile


Her WBC is 10 K, sodium 131.  Other labs are unremarkable.


She still on dexamethasone and multiple vitamins


Patient is doing well generally and we might discharge are sewn once her oxygen 

requirements improved and pulmonary team cleared the patient





Objective





- Vital Signs


Vital signs: 


                                   Vital Signs











Temp  98.1 F   11/26/21 10:04


 


Pulse  57 L  11/26/21 10:04


 


Resp  18   11/26/21 10:04


 


BP  135/74   11/26/21 10:04


 


Pulse Ox  98   11/26/21 10:04








                                 Intake & Output











 11/25/21 11/26/21 11/26/21





 18:59 06:59 18:59


 


Other:   


 


  Voiding Method  Toilet Toilet


 


  # Voids 2 1 














- Exam





GENERAL: The patient is alert and oriented x3, not in any acute distress. Well 

developed, well nourished. 


HEENT: Pupils are round and equally reacting to light. EOMI. No scleral icterus.

No conjunctival pallor. Normocephalic, atraumatic. No pharyngeal erythema. No 

thyromegaly. 


CARDIOVASCULAR: S1 and S2 present. No murmurs, rubs, or gallops. 


PULMONARY: Chest is clear to auscultation, no wheezing or crackles. 


ABDOMEN: Soft, nontender, nondistended, normoactive bowel sounds. No palpable 

organomegaly. 


MUSCULOSKELETAL: No joint swelling or deformity. 


EXTREMITIES: No cyanosis, clubbing, or pedal edema. 


NEUROLOGICAL: Gross neurological examination did not reveal any focal deficits. 


SKIN: No rashes. No petechiae





- Labs


CBC & Chem 7: 


                                 11/26/21 06:46





                                 11/26/21 06:46


Labs: 


                  Abnormal Lab Results - Last 24 Hours (Table)











  11/25/21 11/25/21 11/26/21 Range/Units





  16:51 20:36 06:46 


 


WBC    10.31 H  (4.50-10.00)  X 10*3/uL


 


Hgb    10.3 L  (12.0-15.0)  g/dL


 


Hct    32.5 L  (37.2-46.3)  %


 


MCV    79.3 L  (80.0-97.0)  fL


 


MCH    25.1 L  (27.0-32.0)  pg


 


MCHC    31.7 L  (32.0-37.0)  g/dL


 


RDW    14.6 H  (11.5-14.5)  %


 


Plt Count    602 H  (140-440)  X 10*3/uL


 


MPV    8.9 L  (9.5-12.2)  fL


 


Immature Gran #    0.18 H  (0.00-0.04)  X 10*3/uL


 


Neutrophils #    7.77 H  (1.80-7.70)  X 10*3/uL


 


Monocytes #    1.21 H  (0.20-1.00)  X 10*3/uL


 


Eosinophils #    0.03 L  (0.04-0.35)  X 10*3/uL


 


D-Dimer     (<0.60)  mg/L FEU


 


Sodium     (135-145)  mmol/L


 


Chloride     ()  mmol/L


 


BUN/Creatinine Ratio     (12.00-20.00)  Ratio


 


Glucose     ()  mg/dL


 


POC Glucose (mg/dL)  435 H  260 H   (75-99)  mg/dL


 


ALT     (8-44)  U/L


 


Total Protein     (6.2-8.2)  g/dL


 


Albumin     (3.8-4.9)  g/dL


 


Albumin/Globulin Ratio     (1.60-3.17)  g/dL














  11/26/21 11/26/21 11/26/21 Range/Units





  06:46 06:46 11:56 


 


WBC     (4.50-10.00)  X 10*3/uL


 


Hgb     (12.0-15.0)  g/dL


 


Hct     (37.2-46.3)  %


 


MCV     (80.0-97.0)  fL


 


MCH     (27.0-32.0)  pg


 


MCHC     (32.0-37.0)  g/dL


 


RDW     (11.5-14.5)  %


 


Plt Count     (140-440)  X 10*3/uL


 


MPV     (9.5-12.2)  fL


 


Immature Gran #     (0.00-0.04)  X 10*3/uL


 


Neutrophils #     (1.80-7.70)  X 10*3/uL


 


Monocytes #     (0.20-1.00)  X 10*3/uL


 


Eosinophils #     (0.04-0.35)  X 10*3/uL


 


D-Dimer  1.22 H    (<0.60)  mg/L FEU


 


Sodium   131 L   (135-145)  mmol/L


 


Chloride   95 L   ()  mmol/L


 


BUN/Creatinine Ratio   26.14 H   (12.00-20.00)  Ratio


 


Glucose   69 L   ()  mg/dL


 


POC Glucose (mg/dL)    193 H  (75-99)  mg/dL


 


ALT   50 H   (8-44)  U/L


 


Total Protein   6.1 L   (6.2-8.2)  g/dL


 


Albumin   3.2 L   (3.8-4.9)  g/dL


 


Albumin/Globulin Ratio   1.11 L   (1.60-3.17)  g/dL














Assessment and Plan


Assessment: 








Bilateral acute covid pneumonia


Acute hypoxic respiratory failure


Possible viral gastroenteritis secondary to Covid with mildly elevated liver 

enzymes, improved


Hypothyroidism


History of liver transplant


Hypertension


History of CVA/TIA


Hyperlipidemia


Diabetes mellitus, on insulin

















Plan: 





This is a pleasant 76 years old female who presents with covid pneumonia


Continue with dexamethasone.  And insulin sliding scale


Continue with vitamin C, D and zinc


Pulmonary consult


Labs and medication were reviewed..  Continue same treatment.  Continue with 

symptomatic treatment.  Resume home medication.  Monitor lytes and vitals.  DVT 

and GI prophylaxis.  Further recommendations depends on the clinical course of 

the patient


DVT prophylaxis: Subcutaneous Lovenox


GI Prophylaxis: Pepcid


Prognosis is guarded

## 2021-11-27 LAB
GLUCOSE BLD-MCNC: 134 MG/DL (ref 75–99)
GLUCOSE BLD-MCNC: 148 MG/DL (ref 75–99)
GLUCOSE BLD-MCNC: 232 MG/DL (ref 75–99)
GLUCOSE BLD-MCNC: 316 MG/DL (ref 75–99)

## 2021-11-27 RX ADMIN — METOPROLOL TARTRATE SCH MG: 50 TABLET, FILM COATED ORAL at 19:51

## 2021-11-27 RX ADMIN — LEVOTHYROXINE SODIUM SCH MCG: 50 TABLET ORAL at 06:11

## 2021-11-27 RX ADMIN — METOPROLOL TARTRATE SCH MG: 50 TABLET, FILM COATED ORAL at 10:56

## 2021-11-27 RX ADMIN — INSULIN ASPART SCH UNIT: 100 INJECTION, SOLUTION INTRAVENOUS; SUBCUTANEOUS at 17:38

## 2021-11-27 RX ADMIN — INSULIN ASPART SCH UNIT: 100 INJECTION, SOLUTION INTRAVENOUS; SUBCUTANEOUS at 07:40

## 2021-11-27 RX ADMIN — ASPIRIN SCH: 325 TABLET ORAL at 19:52

## 2021-11-27 RX ADMIN — INSULIN ASPART SCH UNIT: 100 INJECTION, SOLUTION INTRAVENOUS; SUBCUTANEOUS at 21:39

## 2021-11-27 RX ADMIN — MYCOPHENOLIC ACID SCH MG: 180 TABLET, DELAYED RELEASE ORAL at 19:51

## 2021-11-27 RX ADMIN — Medication SCH EACH: at 10:57

## 2021-11-27 RX ADMIN — Medication SCH MG: at 10:56

## 2021-11-27 RX ADMIN — MECLIZINE HYDROCHLORIDE SCH MG: 25 TABLET ORAL at 19:51

## 2021-11-27 RX ADMIN — INSULIN ASPART SCH: 100 INJECTION, SOLUTION INTRAVENOUS; SUBCUTANEOUS at 12:30

## 2021-11-27 RX ADMIN — MECLIZINE HYDROCHLORIDE SCH MG: 25 TABLET ORAL at 10:59

## 2021-11-27 RX ADMIN — Medication SCH MCG: at 10:57

## 2021-11-27 RX ADMIN — FAMOTIDINE SCH MG: 20 TABLET, FILM COATED ORAL at 10:57

## 2021-11-27 RX ADMIN — MYCOPHENOLIC ACID SCH MG: 180 TABLET, DELAYED RELEASE ORAL at 10:57

## 2021-11-27 RX ADMIN — FAMOTIDINE SCH MG: 20 TABLET, FILM COATED ORAL at 19:51

## 2021-11-27 RX ADMIN — ENOXAPARIN SODIUM SCH MG: 40 INJECTION SUBCUTANEOUS at 10:56

## 2021-11-27 RX ADMIN — CLOPIDOGREL BISULFATE SCH MG: 75 TABLET ORAL at 10:58

## 2021-11-27 RX ADMIN — INSULIN DETEMIR SCH UNIT: 100 INJECTION, SOLUTION SUBCUTANEOUS at 07:40

## 2021-11-27 NOTE — P.PN
Subjective


Progress Note Date: 11/27/21


Principal diagnosis: 


  COVID 19





  76-year-old white female patient with past medical history of a potential, 

previous history of CVA 8 years ago without residual deficits, liver tr

ansplantation for history of nonalcoholic steatohepatitis ([place]) in [place], 

lifetime nonsmoker and nondrinker, who presented to the emergency department on 

11/17/2021 with complaints of shortness of breath, fever, and cough.  Patient 

tested positive for COVID 19 one week ago at an urgent care clinic in [place].  She was started on antibiotics for pneumonia the form of doxycycline.  

However her symptom onset was 2 weeks ago and initially started with fatigue and

loss of appetite and progressed to shortness of breath, cough and fever.  

Patient is not vaccinated for COVID-19 related to patient's history of severe 

reaction to even oral contrast when she has to have a CT of the abdomen comp

leted.  She is on 4 L of oxygen her pulse ox of 90-92%, her chest x-ray in 

emergency department shows interstitial prominence, asymmetric elevation of the 

right hemidiaphragm, no consolidation or pleural effusion.  Admission blood work

shows white blood cell, 4.5, hemoglobin of 10.5, platelet count is 261, 

lymphocyte count is 0.5, d-dimer is 1.2, INR is 1.0, sodium is 135, potassium is

4.7, CO2 17, BUN is 18, creatinine 0.71, magnesium is 1.2, AST is 83, ALT is 36,

alkaline phosphatase is 101, troponin is negative at 0.018, proBNP is 1860.  

Patient takes cyclosporine and mycophenilate for her history of liver 

transplant, she was started on Decadron 6 mg daily, she is on prophylactic dose 

Lovenox and COVID-19 vitamins.  She is breathing comfortably, has a dry 

nonproductive cough, she is afebrile.  She had a mild diarrhea and loss of 

appetite, but no vomiting or nausea.





On 11/19/2021 patient seen in follow-up on medical surgical floor, she is 

breathing comfortably, she is currently on 3 L of oxygen pulse ox is 90-95%, no 

acute events overnight, lower extremity Dopplers were negative for DVT in 

bilateral lower extremities, lung sounds reveal minimal crackles at the bases, 

no nausea vomiting no diarrhea, no acute events overnight, no fever or chills.





On 11/22/2021 patient seen in follow-up on medical surgical floor, she seems to 

be breathing comfortably, she is sitting by the window, she is currently on 8 L 

of oxygen however nursing staff reports difficulty keeping the patient's oxygen 

on her and she frequently takes it off.  When she is wearing it her pulse ox is 

96-97% on 8 afebrile, hemodynamically she has been stable however neurologically

patient seems more confused, inattentive, she is only oriented to self, 

disoriented to place, and time, she thought Moe Dillon was the president.  She

is having difficulty concentrating, it takes repetition with questioning to get 

her to pay attention.  From pulmonary perspective although she still requiring 

high concentration oxygen she does not appear to be in any distress, lung sounds

reveal bibasilar crackles, no rhonchi or wheezing.  She is currently on Decadron

6 mg daily, she is on prophylactic Lovenox, she is on vitamins.  Yesterday's 

chest x-ray was showing bilateral left greater than right interstitial and patc

hy opacities slightly increased in the interval.  Patient has been incontinent, 

she is wearing a brief, however she will not wear P and's, and last night 

nursing staff reports she got up unassisted and was ambulating down the grijalva 

without her brief or oxygen. NO focal neurological deficits, however patient had

a previous history of CVA in the past, will obtain brain CT and consult 

neurology





On 11/23/2021 patient seen in follow-up on the surgical floor, she remains very 

confused, cannot keep her oxygen on, takes it off, and nursing staff needs to 

check on her to make sure the oxygen is on.  She was attempting to eat the 

earpieces on the nasal cannula.  Brain CT showed no acute intracranial a

bnormality.  She was seen by neurology please refer to the consultation.  She 

has been moved closer to the desk for closer monitoring.  On sounds reveal 

minimal crackles at the bases, no worsening dyspnea, she is currently on 8 L of 

oxygen and her pulse ox is 91%. Procalcitonin negative.  Blood sugars have been 

elevated and ranging from 140-318.  Patient on sliding scale insulin.





On 11/27/2021 patient is seen in follow-up on medical surgical floor, she is 

currently down to 4 L of oxygen, and her pulse ox was 97%, and FiO2 was 

subsequently turned out to 3 L, she is feeling better, mentation has improved, 

she is oriented 3, no worsening dyspnea, no fever or chills, no combines of 

chest discomfort, lung sounds reveal diminished breath sounds with minimal 

basilar crackles, no acute events overnight.  She remains on Decadron 6 mg 

daily, prophylactic Lovenox, and COVID-19 vitamins.  Her chest x-ray from 

11/25/2021 showed diminished inspiration with elevated right hemidiaphragm, and 

worsened bilateral multifocal opacities greatest in the periphery.  Clinically 

patient has improved.  No new labs today, d-dimer is 1.22.  White blood cell 

count is 10.3, hemoglobin is 10.3, potassium is 4.4, chloride is 95, BUN of 22 

creatinine 0.9, LDH is improving and is down to 189, and CRP is down to 2.8, pro

calcitonin level was negative at 0.08.





Objective





- Vital Signs


Vital signs: 


                                   Vital Signs











Temp  97.9 F   11/27/21 14:42


 


Pulse  68   11/27/21 14:42


 


Resp  17   11/27/21 14:42


 


BP  117/68   11/27/21 14:42


 


Pulse Ox  83 L  11/27/21 15:29








                                 Intake & Output











 11/26/21 11/27/21 11/27/21





 18:59 06:59 18:59


 


Intake Total 540  


 


Balance 540  


 


Intake:   


 


  Oral 540  


 


Other:   


 


  Voiding Method Toilet Toilet Toilet


 


  # Voids 2 2 














- Exam


 GENERAL EXAM: Alert,  76-year-old white female, on 3 L of oxygen with a pulse 

ox of 96% comfortable in no apparent distress. Inattentive, takes several 

repetitions to get patient to answer questions


HEAD: Normocephalic/atraumatic.


EYES: Normal reaction of pupils, equal size.  Conjunctiva pink, sclera white.


NOSE: Clear with pink turbinates.


THROAT: No erythema or exudates.


NECK: No masses, no JVD, no thyroid enlargement, no adenopathy.


CHEST: No chest wall deformity.  Symmetrical expansion. 


LUNGS: Equal air entry with mild bibasilar crackles


CVS: Regular rate and rhythm, normal S1 and S2, no gallops, no murmurs, no rubs


ABDOMEN: Soft, nontender.  No hepatosplenomegaly, normal bowel sounds, no 

guarding or rigidity.


EXTREMITIES: No clubbing, no edema, no cyanosis, 2+ pulses and upper and lower 

extremities.


MUSCULOSKELETAL: Muscle strength and tone normal.


SPINE: No scoliosis or deformity


SKIN: No rashes


CENTRAL NERVOUS SYSTEM: Alert and oriented -3.  No focal deficits, tone is 

normal in all 4 extremities.


PSYCHIATRIC: Alert and oriented -3.  Appropriate affect.  Intact judgment and 

insight.











- Labs


CBC & Chem 7: 


                                 11/26/21 06:46





                                 11/26/21 06:46


Labs: 


                  Abnormal Lab Results - Last 24 Hours (Table)











  11/26/21 11/27/21 11/27/21 Range/Units





  20:14 07:09 11:45 


 


POC Glucose (mg/dL)  284 H  148 H  134 H  (75-99)  mg/dL














  11/27/21 Range/Units





  16:31 


 


POC Glucose (mg/dL)  316 H  (75-99)  mg/dL














Assessment and Plan


Plan: 


 Assessment:





#1.  Acute hypoxic respiratory failure related to COVID-19 pneumonia with onset 

of symptoms 2 weeks ago, patient is outside the window for Remdesivir, she has 

not vaccinated





#2.  Altered mental status, confusion, brain CT was negative, related to 

metabolic and hypoxic encephalopathy, neurology is following.  Improving





#3.  History of liver transplant by history of nonalcoholic steatohepatitis, she

is on cyclosporine and Mycophenolate





#4.  Hypertension





#5.  Diabetes mellitus





#6.  Hyperlipidemia





#7.  Previous history of CVA with no residual neurologic deficits





#8.  Hypothyroidism





#9.  Lifetime nonsmoker





Plan:





Oxygenation has improved, and FiO2 is currently down to 3 L


Inflammatory markers are improving


Patient awake and alert, oriented 3


Increase activity as tolerated


She can be considered for discharge home today or in the morning


Obtain home oxygen assessment


Taking continue on Decadron for a total of 10 day course


COVID-19 vitamins


Outpatient follow-up with Dr. Lorenzana in the office in 2 weeks








I performed a history & physical examination of the patient and discussed their 

management with my nurse practitioner, Maxine Palomares.  I reviewed the nurse 

practitioner's note and agree with the documented findings and plan of care.  

Lung sounds are positive for diffuse crackles throughout the lung fields.  The 

findings and the impression was discussed with the patient.  I attest to the 

documentation by the nurse practitioner. 











Time with Patient: Less than 30

## 2021-11-27 NOTE — P.PN
Subjective





This is a pleasant 76 results female with past medical history of 

hypothyroidism, liver transplant, CVA/TIA, hypertension, diabetes mellitus, 

hyperlipidemia


Patient presents because of difficulty breathing for about a week associated 

with cough and yellowish phlegm but no chest pain.


She had diarrhea like 3 times this morning.  She has diarrhea for 3 days.  No 

abdominal pain or vomiting.


She went to urgent care last Sunday about 3 days ago and they were diagnosed her

with pneumonia, next day her Covid test came back positive she denies history of

smoking, alcohol or illicit drugs.  She does not have history of COPD.  She is 

not on home oxygen





She is hypoxic at 83% on room air and tachypneic with a breathing rate 30-40, 

tachycardic 122.  She is afebrile at 97.8.


Labs unremarkable including CBC, INR, BMP.  Liver enzymes slightly elevated with

AST 83 and ALT 36.  Bilirubin is normal 1.0.


Chest x-ray: Bilateral infiltrate with interstitial prominence has increased





11/18/2021


Patient today is awake, she is slightly tachypneic while sitting in bed.  No 

significant dyspnea.  No significant coughing.  She denies chest pain.


She denies diarrhea.


She is saturating low 90s on 4 L oxygen via nasal cannula which is similar to 

yesterday.  Fever on admission 100.0.  Sided yesterday.


labs from today are still pending.


Sugar this morning to 30 patient is on insulin sliding scale


She remains on dexamethasone, and multiple vitamins.





11/19/2021


Patient breathing is stable and she is on 4 L/m of oxygen with good oxygen 

saturation.


Rest of vital signs stable, afebrile since admission when she had fever of 

100.2.  Labs are stable.


Leg ultrasound was negative.  She remains on dexamethasone vitamin C, D and 

zinc.


Possible discharge in 24-48 hours if she keeps improvement





11/20/2021


Patient today was L Delgadillo tired although she says she looks the same.  Her 

oxygen requirement went up to 6 L/m so we have to keep monitor the patient in 

the hospital


Blood pressure is slightly elevated while she is on steroids.  No more fevers 

since admission.  


She remains on dexamethasone and multiple vitamins and Lovenox





11/21/2021


Patient looks more tired, her breathing looks the same with dyspnea however her 

oxygen requirements increased to 8 L/m.


She has mild lab abnormality like mild increased liver enzymes, mild 

leukocytosis, mild hyponatremia.


Chest x-ray was showing slightly increased infiltrates


She remains on dexamethasone, Lovenox and multiple vitamins.


D-dimer is a stable 1.2-1.6





11/22/2021


Patient today was sensitive, children in bed while oxygen off, she wasn't as 

questions readily as of yesterday, altered mental status is suspected, CT of the

brain is requested as well as nephrology consult


Her oxygen requirement went up to 8 L/m.


She is afebrile and vitals are stable.


She still on dexamethasone, vitamin C, vitamin D and zinc.  Also she is on 

insulin and her Plavix.





11/23/2021


Patient still Confused at time, when I talked the patient should've very 

appropriate and oriented however better on per staff she started taking her 

oxygen off again.  Suggest saturation and requirements went up to 8 L/m again 

today.


She is mildly tachypneic but denies any specific symptoms like no chest pain.  

No dizziness.  No abdominal pain or diarrhea or vomiting.  No headache or 

difficulty moving extremities.


She is hemodynamically stable other than the hypoxia.  No more fever.


Her exam is only mildly elevated but ammonia is negative with less than 9.  WBC 

normal and stable around 10+.  Sodium stable 1:.  LDH slightly elevated at

366 and subjective 14 7.9.


She remains on dexamethasone, vitamin C, D and zinc.  Also her sugar is 

controlled on home dose of Levemir 40 units at bedtime.


She is also on Pepcid and Lovenox 





11/24/2021


Patient still feels generally weak, no specific complaint.  She is awake and 

alert, she is lethargic.  He was not complaining much from dyspnea while she is 

at rest.


She was on 8 L/m of oxygen today but her oxygen saturation was more than 95%-98%


MRI of the brain today came back with no infarct.  And patient actually looks 

fully awake and oriented she is just tired


Other than that she remains on the same treatment of dexamethasone and vitamins





11/25/2021


Patient is more up in bed today awake and alert, denies headache or weakness.  

An MRI of the brain came back negative and I discussed the case with neurology 

service.  Patient looks like does not have any altered mental status she has 

some mild metabolic encephalopathy probably from hypoxia and her Covid infection

but that's improving.  Also she is lethargic


She is on 6 L oxygen per minute today.  Repeat chest x-ray showed worsening 

bilateral opacity in the peripherally indicating worsening Covid pneumonia.


Remains on the same treatment of steroids, multiple vitamins, she is on 

dexamethasone 6 mg.


Repeat labs tomorrow morning





11/26/2021





Patient awake and alert, no confusion.  Patient is back to her normal mentation.

 No significant tachypnea or dyspnea but she still on 60 to per minute of oxygen

with saturation of 98%


Blood pressure is controlled.  Afebrile


Her WBC is 10 K, sodium 131.  Other labs are unremarkable.


She still on dexamethasone and multiple vitamins


Patient is doing well generally and we might discharge are sewn once her oxygen 

requirements improved and pulmonary team cleared the patient





11/27/2021


Patient today is doing great, she is fully awake and oriented, she is saturating

mid 90s on 4 L oxygen via nasal cannula, later on her oxygen requirement went 

down to 3 L/m with saturation of 99%.  Rest of vital signs stable.  No labs 

today.  Patient is doing very well and almost back to normal status.


Possible discharge in 24-48 hours





Objective





- Vital Signs


Vital signs: 


                                   Vital Signs











Temp  98.3 F   11/27/21 09:57


 


Pulse  75   11/27/21 09:57


 


Resp  17   11/27/21 09:57


 


BP  128/75   11/27/21 09:57


 


Pulse Ox  90 L  11/27/21 09:57








                                 Intake & Output











 11/26/21 11/27/21 11/27/21





 18:59 06:59 18:59


 


Intake Total 540  


 


Balance 540  


 


Intake:   


 


  Oral 540  


 


Other:   


 


  Voiding Method Toilet Toilet Toilet


 


  # Voids 2 2 














- Exam





GENERAL: The patient is alert and oriented x3, not in any acute distress. Well 

developed, well nourished. 


HEENT: Pupils are round and equally reacting to light. EOMI. No scleral icterus.

No conjunctival pallor. Normocephalic, atraumatic. No pharyngeal erythema. No 

thyromegaly. 


CARDIOVASCULAR: S1 and S2 present. No murmurs, rubs, or gallops. 


PULMONARY: Chest is clear to auscultation, no wheezing or crackles. 


ABDOMEN: Soft, nontender, nondistended, normoactive bowel sounds. No palpable 

organomegaly. 


MUSCULOSKELETAL: No joint swelling or deformity. 


EXTREMITIES: No cyanosis, clubbing, or pedal edema. 


NEUROLOGICAL: Gross neurological examination did not reveal any focal deficits. 


SKIN: No rashes. No petechiae





- Labs


CBC & Chem 7: 


                                 11/26/21 06:46





                                 11/26/21 06:46


Labs: 


                  Abnormal Lab Results - Last 24 Hours (Table)











  11/26/21 11/26/21 11/26/21 Range/Units





  06:46 16:49 20:14 


 


POC Glucose (mg/dL)   363 H  284 H  (75-99)  mg/dL


 


C-Reactive Protein  2.80 H    (0.00-0.80)  mg/dL














  11/27/21 11/27/21 Range/Units





  07:09 11:45 


 


POC Glucose (mg/dL)  148 H  134 H  (75-99)  mg/dL


 


C-Reactive Protein    (0.00-0.80)  mg/dL














Assessment and Plan


Assessment: 








Bilateral acute covid pneumonia


Acute hypoxic respiratory failure


Possible viral gastroenteritis secondary to Covid with mildly elevated liver 

enzymes, improved


Hypothyroidism


History of liver transplant


Hypertension


History of CVA/TIA


Hyperlipidemia


Diabetes mellitus, on insulin

















Plan: 





This is a pleasant 76 years old female who presents with covid pneumonia


Continue with dexamethasone.  And insulin sliding scale


Continue with vitamin C, D and zinc


Pulmonary consult


Labs and medication were reviewed..  Continue same treatment.  Continue with 

symptomatic treatment.  Resume home medication.  Monitor lytes and vitals.  DVT 

and GI prophylaxis.  Further recommendations depends on the clinical course of 

the patient


DVT prophylaxis: Subcutaneous Lovenox


GI Prophylaxis: Pepcid


Prognosis is guarded

## 2021-11-28 VITALS — RESPIRATION RATE: 17 BRPM | HEART RATE: 62 BPM | DIASTOLIC BLOOD PRESSURE: 82 MMHG | SYSTOLIC BLOOD PRESSURE: 128 MMHG

## 2021-11-28 VITALS — TEMPERATURE: 98.5 F

## 2021-11-28 LAB
GLUCOSE BLD-MCNC: 118 MG/DL (ref 75–99)
GLUCOSE BLD-MCNC: 162 MG/DL (ref 75–99)

## 2021-11-28 RX ADMIN — INSULIN DETEMIR SCH UNIT: 100 INJECTION, SOLUTION SUBCUTANEOUS at 07:16

## 2021-11-28 RX ADMIN — INSULIN ASPART SCH UNIT: 100 INJECTION, SOLUTION INTRAVENOUS; SUBCUTANEOUS at 12:24

## 2021-11-28 RX ADMIN — ENOXAPARIN SODIUM SCH MG: 40 INJECTION SUBCUTANEOUS at 09:12

## 2021-11-28 RX ADMIN — CLOPIDOGREL BISULFATE SCH MG: 75 TABLET ORAL at 09:15

## 2021-11-28 RX ADMIN — FAMOTIDINE SCH MG: 20 TABLET, FILM COATED ORAL at 09:13

## 2021-11-28 RX ADMIN — MYCOPHENOLIC ACID SCH MG: 180 TABLET, DELAYED RELEASE ORAL at 09:13

## 2021-11-28 RX ADMIN — MECLIZINE HYDROCHLORIDE SCH MG: 25 TABLET ORAL at 09:14

## 2021-11-28 RX ADMIN — LEVOTHYROXINE SODIUM SCH MCG: 50 TABLET ORAL at 05:46

## 2021-11-28 RX ADMIN — Medication SCH MG: at 09:13

## 2021-11-28 RX ADMIN — Medication SCH EACH: at 09:14

## 2021-11-28 RX ADMIN — INSULIN ASPART SCH: 100 INJECTION, SOLUTION INTRAVENOUS; SUBCUTANEOUS at 07:31

## 2021-11-28 RX ADMIN — Medication SCH MCG: at 09:13

## 2021-11-28 RX ADMIN — METOPROLOL TARTRATE SCH MG: 50 TABLET, FILM COATED ORAL at 09:13

## 2021-11-28 NOTE — P.DS
Providers


Date of admission: 


11/17/21 14:19





Attending physician: 


Orlin Pacheco MD





Consults: 





                                        





11/17/21 14:08


Consult Physician Urgent 


   Consulting Provider: Gumaro Johnson


   Consult Reason/Comments: COVID-19 hypoxia


   Do you want consulting provider notified?: Yes





11/22/21 12:38


Consult Physician Routine 


   Consulting Provider: Anthony Lorenzana


   Consult Reason/Comments: altered mental status


   Do you want consulting provider notified?: Yes











Primary care physician: 


CALLI Evans





Hospital Course: 





Diagnoses:


Bilateral acute covid pneumonia


Acute hypoxic respiratory failure


Possible viral gastroenteritis secondary to Covid with mildly elevated liver 

enzymes, improved


Hypothyroidism


History of liver transplant


Hypertension


History of CVA/TIA


Hyperlipidemia


Diabetes mellitus, on insulin





Hospital course:


This is a pleasant 76 results female with past medical history of 

hypothyroidism, liver transplant, CVA/TIA, hypertension, diabetes mellitus, 

hyperlipidemia


Patient presents because of difficulty breathing for about a week associated 

with cough and yellowish phlegm but no chest pain.  Patient found to have 

bilateral Covid pneumonia with hypoxia and she was placed on 4 L/m of oxygen and

her requirements went up to 8 L permanents.  She has been evaluated by pulmonary

team will follow her very closely and she was treated with dexamethasone, 

vitamin C, vitamin D and zinc as well as Lovenox and Pepcid.  With a close 

monitoring patient showed interval improvement and today she is fully awake 

oriented she is saturating well 92-93% arthritic oxygen via nasal cannula, she 

qualifies for home oxygen and it was delivered per staff to home at at bedside.


Today she denies any dyspnea, no coughing, no chest pain, no abdominal pain.  No

change in urine or bowel habits.  No fever


Patient was so eager to go home today.


She was cleared for discharge by pulmonary team


Problems and management plan were discussed with the patient and he verbalized 

understanding and acceptance


Patient was found stable and can be discharged home however he needs follow-up 

as an outpatient. Patient was instructed to follow up with PCP Dr. Evans within

one week and patient agrees


Patient also was instructed to follow up with pulmonologist Dr. Rehman in 2 weeks

and she agrees to call and make her own appointments





Physical exam


Gen: patient is a AAOx3, no distress


CVS: S1-S2, RRR, no murmur


Lungs: B/L CTA, no wheezing


Abdomen: soft, no distention, no tenderness, positive bowel sounds


Extremity: no leg edema or induration





Time spent more than 35 minutes





Patient Condition at Discharge: Serious





Plan - Discharge Summary


Discharge Rx Participant: No


New Discharge Prescriptions: 


New


   Dexamethasone [Decadron] 6 mg PO DAILY 5 Days #5 tablet


   Famotidine [Pepcid] 20 mg PO BID #30 tab


   Cholecalciferol [Vitamin D3 (25 Mcg = 1000 Iu)] 50 mcg PO DAILY #60 tablet


   Zinc Sulfate [Orazinc] 220 mg PO DAILY #30 cap





Continue


   Glucosamine/Chondr Coffey A Sod [Osteo Bi-Flex Caplet] 2 tab PO DAILY


   Magnesium Oxide 400 mg PO DAILY


   Vit C/E/Zn/Coppr/Lutein/Zeaxan [Preservision Areds 2 Softgel] 1 cap PO DAILY


   Metoprolol Tartrate [Lopressor] 50 mg PO BID


   Lisinopril [Prinivil] 10 mg PO DAILY


   Acetaminophen Tab [Tylenol] 1,000 mg PO BID PRN


     PRN Reason: Pain


   Pantoprazole Sodium [Protonix] 40 mg PO AC-BRKFST


   Levothyroxine Sodium [Synthroid] 50 mcg PO DAILY


   Clopidogrel [Plavix] 75 mg PO DAILY


   sitaGLIPtin [Januvia] 100 mg PO DAILY


   Insulin Glargine [Lantus Vial] 40 unit SQ DAILY


   Mycophenolate Sodium [Mycophenolic Acid] 360 mg PO BID


   cycloSPORINE [SandIMMUNE] 50 mg PO HS


   cycloSPORINE [SandIMMUNE] 25 mg PO DAILY


   Calcium Carbonate/Vitamin D3 [Calcium 500-Vit D3 5 Mcg (200 Iu)] 1 tab PO 

DAILY


   Famotidine [Pepcid] 20 mg PO BID


   Insulin Regular, Human [Novolin R Flexpen] See Protocol SQ AC-TID


   Omega-3 Acid Ethyl Esters [Lovaza] 2 gm PO BID


   Fluoride (Sodium) [Sodium Fluoride 5000 Plus] 1 applic DENTAL BID


   Meclizine [Antivert] 25 mg PO BID


   Rosuvastatin [Crestor] 10 mg PO HS





Discontinued


   Zinc Gluconate [Zinc] 50 mg PO DAILY


   Multivitamins, Thera [Multivitamin (formulary)] 1 tab PO DAILY


   Furosemide [Lasix] 20 mg PO DAILY PRN


     PRN Reason: swelling


   Cephalexin [Keflex] 250 mg PO DAILY


   Amoxic-Pot Clav 875-125Mg [Augmentin 875-125] 1 tab PO BID


   Amoxicillin 2,000 mg PO ONCE PRN


     PRN Reason: 1 hour prior to dental appt


Discharge Medication List





Acetaminophen Tab [Tylenol] 1,000 mg PO BID PRN 11/11/20 [History]


Calcium Carbonate/Vitamin D3 [Calcium 500-Vit D3 5 Mcg (200 Iu)] 1 tab PO DAILY 

11/11/20 [History]


Clopidogrel [Plavix] 75 mg PO DAILY 11/11/20 [History]


Glucosamine/Chondr Coffey A Sod [Osteo Bi-Flex Caplet] 2 tab PO DAILY 11/11/20 

[History]


Insulin Glargine [Lantus Vial] 40 unit SQ DAILY 11/11/20 [History]


Levothyroxine Sodium [Synthroid] 50 mcg PO DAILY 11/11/20 [History]


Lisinopril [Prinivil] 10 mg PO DAILY 11/11/20 [History]


Magnesium Oxide 400 mg PO DAILY 11/11/20 [History]


Metoprolol Tartrate [Lopressor] 50 mg PO BID 11/11/20 [History]


Mycophenolate Sodium [Mycophenolic Acid] 360 mg PO BID 11/11/20 [History]


Pantoprazole Sodium [Protonix] 40 mg PO AC-BRKFST 11/11/20 [History]


Vit C/E/Zn/Coppr/Lutein/Zeaxan [Preservision Areds 2 Softgel] 1 cap PO DAILY 1

1/11/20 [History]


cycloSPORINE [SandIMMUNE] 25 mg PO DAILY 11/11/20 [History]


cycloSPORINE [SandIMMUNE] 50 mg PO HS 11/11/20 [History]


sitaGLIPtin [Januvia] 100 mg PO DAILY 11/11/20 [History]


Famotidine [Pepcid] 20 mg PO BID 11/17/21 [History]


Fluoride (Sodium) [Sodium Fluoride 5000 Plus] 1 applic DENTAL BID 11/17/21 

[History]


Insulin Regular, Human [Novolin R Flexpen] See Protocol SQ AC-TID 11/17/21 

[History]


Meclizine [Antivert] 25 mg PO BID 11/17/21 [History]


Omega-3 Acid Ethyl Esters [Lovaza] 2 gm PO BID 11/17/21 [History]


Rosuvastatin [Crestor] 10 mg PO HS 11/17/21 [History]


Cholecalciferol [Vitamin D3 (25 Mcg = 1000 Iu)] 50 mcg PO DAILY #60 tablet 

11/28/21 [Rx]


Dexamethasone [Decadron] 6 mg PO DAILY 5 Days #5 tablet 11/28/21 [Rx]


Famotidine [Pepcid] 20 mg PO BID #30 tab 11/28/21 [Rx]


Zinc Sulfate [Orazinc] 220 mg PO DAILY #30 cap 11/28/21 [Rx]








Follow up Appointment(s)/Referral(s): 


CALLI Evans III, MD [Primary Care Provider] - 1-2 days (office closed Please 

call to schedule appointment )


Bernard Lorenzana DO [Doctor of Osteopathic Medicine] - 2 Weeks (office closed 

Please call to scheudle appointment )


Patient Instructions/Handouts:  Coronavirus Disease 2019 (COVID-19)


Activity/Diet/Wound Care/Special Instructions: 


Heart healthy low carbohydrate 1600 kcal per day





Activity is restricted till you see your doctors





Discharge Disposition: HOME SELF-CARE


Care Plan Goals (MU): 


[facility] . Will deliver portable to hospital and 

concentrator for home. Any questions please call agency.

## 2022-01-04 ENCOUNTER — HOSPITAL ENCOUNTER (OUTPATIENT)
Dept: HOSPITAL 47 - RADCTMAIN | Age: 77
Discharge: HOME | End: 2022-01-04
Attending: INTERNAL MEDICINE
Payer: MEDICARE

## 2022-01-04 DIAGNOSIS — J98.6: ICD-10-CM

## 2022-01-04 DIAGNOSIS — J84.10: Primary | ICD-10-CM

## 2022-01-04 PROCEDURE — 71250 CT THORAX DX C-: CPT

## 2022-01-04 PROCEDURE — 76000 FLUOROSCOPY <1 HR PHYS/QHP: CPT

## 2022-01-04 NOTE — CT
EXAMINATION TYPE: CT chest wo con

 

DATE OF EXAM: 1/4/2022

 

COMPARISON: Chest x-ray December 21, 2021 and older studies.

 

HISTORY: pulmonary fibrosis, history of covid pneumonia 2 months ago.

 

CT DLP: 516.3 mGycm.  Automated Exposure Control for Dose Reduction was Utilized.

 

TECHNIQUE:  CT scan of the thorax is performed without IV contrast. High-resolution protocol with 1 m
m sequences obtained at 10 mm intervals in supine and prone technique.

 

FINDINGS:

 

LUNGS: Elevated right hemidiaphragm redemonstrated. This has been present back through September 18, 2020 chest x-ray. There is bilateral reticulation and intralobular septal thickening seen diffusely b
ut greatest in the lower lungs with some areas of groundglass opacity remain present bilaterally. No 
pleural effusion or pneumothorax seen bilaterally.

 

MEDIASTINUM: Lack of IV contrast and technique are noted to limit evaluation for mediastinal and estefani
cially hilar adenopathy. There are no definitive greater than 1 cm mediastinal lymph nodes.   No card
iomegaly or pericardial effusion is seen. Moderate to severe three-vessel coronary artery calcificati
on. Ectatic ascending aorta up to 3.8 cm axial image 16 series 7.

 

OTHER: Cholecystectomy clips. 

 

IMPRESSION: Fairly moderate bilateral chronic parenchymal changes now present. Some areas of residual
 acute infiltrate not excluded. Chronic elevated right hemidiaphragm.

## 2022-01-04 NOTE — FL
EXAMINATION TYPE: FL sniff test without CXR

 

DATE OF EXAM: 1/4/2022

 

COMPARISON: Chest CT today. Chest x-ray December 21, 2021 and older x-rays September 18, 2020.

 

HISTORY: Abnormal x-ray with elevated right hemidiaphragm. Pulmonary fibrosis per order. Recent covid
 infection in November 2021

 

TECHNIQUE: Fluoroscopic guidance was provided during sniff test procedure performed by myself.  A tot
al of 51 seconds of fluoroscopic time was utilized during the procedure and 102 spot images are acqui
red.

 

FINDINGS:  Elevated right hemidiaphragm is seen. Patient has suboptimal deep inspiration and expirati
on. There is overall diminished inferior displacement on inspiration and superior displacement and ex
piration of the right hemidiaphragm relative to left side during rapid and deep breathing movements. 
No paradoxical motion noted. Review of x-rays shows chronic elevated right hemidiaphragm back through
 September 18, 2020.

 

IMPRESSION:  As Above.

## 2022-06-03 ENCOUNTER — HOSPITAL ENCOUNTER (OUTPATIENT)
Dept: HOSPITAL 47 - LABWHC1 | Age: 77
Discharge: HOME | End: 2022-06-03
Attending: INTERNAL MEDICINE
Payer: MEDICARE

## 2022-06-03 DIAGNOSIS — N18.31: Primary | ICD-10-CM

## 2022-06-03 DIAGNOSIS — Z94.4: ICD-10-CM

## 2022-06-03 LAB
ALBUMIN SERPL-MCNC: 4.5 G/DL (ref 3.8–4.9)
ALBUMIN/GLOB SERPL: 1.25 G/DL (ref 1.6–3.17)
ALP SERPL-CCNC: 65 U/L (ref 41–126)
ALT SERPL-CCNC: 99 U/L (ref 8–44)
ANION GAP SERPL CALC-SCNC: 18 MMOL/L (ref 10–18)
AST SERPL-CCNC: 112 U/L (ref 13–35)
BASOPHILS # BLD AUTO: 0.02 X 10*3/UL (ref 0–0.1)
BASOPHILS NFR BLD AUTO: 0.2 %
BUN SERPL-SCNC: 18.1 MG/DL (ref 9–27)
BUN/CREAT SERPL: 15.08 RATIO (ref 12–20)
CALCIUM SPEC-MCNC: 9.4 MG/DL (ref 8.7–10.3)
CHLORIDE SERPL-SCNC: 92 MMOL/L (ref 96–109)
CO2 SERPL-SCNC: 23 MMOL/L (ref 20–27.5)
EOSINOPHIL # BLD AUTO: 0 X 10*3/UL (ref 0.04–0.35)
EOSINOPHIL NFR BLD AUTO: 0 %
ERYTHROCYTE [DISTWIDTH] IN BLOOD BY AUTOMATED COUNT: 4.53 X 10*6/UL (ref 4.1–5.2)
ERYTHROCYTE [DISTWIDTH] IN BLOOD: 14.5 % (ref 11.5–14.5)
GLOBULIN SER CALC-MCNC: 3.6 G/DL (ref 1.6–3.3)
GLUCOSE SERPL-MCNC: 321 MG/DL (ref 70–110)
HCT VFR BLD AUTO: 37 % (ref 37.2–46.3)
HGB BLD-MCNC: 11.9 G/DL (ref 12–15)
IMM GRANULOCYTES BLD QL AUTO: 1.1 %
LYMPHOCYTES # SPEC AUTO: 2.36 X 10*3/UL (ref 0.9–5)
LYMPHOCYTES NFR SPEC AUTO: 20.5 %
MCH RBC QN AUTO: 26.3 PG (ref 27–32)
MCHC RBC AUTO-ENTMCNC: 32.2 G/DL (ref 32–37)
MCV RBC AUTO: 81.7 FL (ref 80–97)
MONOCYTES # BLD AUTO: 0.43 X 10*3/UL (ref 0.2–1)
MONOCYTES NFR BLD AUTO: 3.7 %
NEUTROPHILS # BLD AUTO: 8.57 X 10*3/UL (ref 1.8–7.7)
NEUTROPHILS NFR BLD AUTO: 74.5 %
NRBC BLD AUTO-RTO: 0 /100 WBCS (ref 0–0)
PLATELET # BLD AUTO: 390 X 10*3/UL (ref 140–440)
POTASSIUM SERPL-SCNC: 3.7 MMOL/L (ref 3.5–5.5)
PROT SERPL-MCNC: 8.1 G/DL (ref 6.2–8.2)
PROT/CREAT UR-RTO: 4.89
SODIUM SERPL-SCNC: 133 MMOL/L (ref 135–145)
WBC # BLD AUTO: 11.51 X 10*3/UL (ref 4.5–10)

## 2022-06-03 PROCEDURE — 36415 COLL VENOUS BLD VENIPUNCTURE: CPT

## 2022-06-03 PROCEDURE — 80158 DRUG ASSAY CYCLOSPORINE: CPT

## 2022-06-03 PROCEDURE — 84156 ASSAY OF PROTEIN URINE: CPT

## 2022-06-03 PROCEDURE — 80053 COMPREHEN METABOLIC PANEL: CPT

## 2022-06-03 PROCEDURE — 82570 ASSAY OF URINE CREATININE: CPT

## 2022-06-03 PROCEDURE — 85025 COMPLETE CBC W/AUTO DIFF WBC: CPT

## 2022-06-07 ENCOUNTER — HOSPITAL ENCOUNTER (OUTPATIENT)
Dept: HOSPITAL 47 - EC | Age: 77
Setting detail: OBSERVATION
LOS: 2 days | Discharge: HOME | End: 2022-06-09
Attending: HOSPITALIST | Admitting: HOSPITALIST
Payer: MEDICARE

## 2022-06-07 VITALS — BODY MASS INDEX: 28.3 KG/M2

## 2022-06-07 DIAGNOSIS — Z90.710: ICD-10-CM

## 2022-06-07 DIAGNOSIS — E78.1: ICD-10-CM

## 2022-06-07 DIAGNOSIS — Z79.890: ICD-10-CM

## 2022-06-07 DIAGNOSIS — Z79.02: ICD-10-CM

## 2022-06-07 DIAGNOSIS — E78.5: ICD-10-CM

## 2022-06-07 DIAGNOSIS — Z88.5: ICD-10-CM

## 2022-06-07 DIAGNOSIS — Z86.16: ICD-10-CM

## 2022-06-07 DIAGNOSIS — I16.1: ICD-10-CM

## 2022-06-07 DIAGNOSIS — R41.0: ICD-10-CM

## 2022-06-07 DIAGNOSIS — R47.01: ICD-10-CM

## 2022-06-07 DIAGNOSIS — R55: Primary | ICD-10-CM

## 2022-06-07 DIAGNOSIS — I08.2: ICD-10-CM

## 2022-06-07 DIAGNOSIS — Z86.73: ICD-10-CM

## 2022-06-07 DIAGNOSIS — Z79.899: ICD-10-CM

## 2022-06-07 DIAGNOSIS — E03.9: ICD-10-CM

## 2022-06-07 DIAGNOSIS — Z79.84: ICD-10-CM

## 2022-06-07 DIAGNOSIS — Z98.890: ICD-10-CM

## 2022-06-07 DIAGNOSIS — I10: ICD-10-CM

## 2022-06-07 DIAGNOSIS — I45.10: ICD-10-CM

## 2022-06-07 DIAGNOSIS — Z91.041: ICD-10-CM

## 2022-06-07 DIAGNOSIS — E11.65: ICD-10-CM

## 2022-06-07 DIAGNOSIS — E83.42: ICD-10-CM

## 2022-06-07 DIAGNOSIS — Z94.4: ICD-10-CM

## 2022-06-07 DIAGNOSIS — Z83.79: ICD-10-CM

## 2022-06-07 DIAGNOSIS — Z88.8: ICD-10-CM

## 2022-06-07 DIAGNOSIS — Z90.49: ICD-10-CM

## 2022-06-07 DIAGNOSIS — Z79.4: ICD-10-CM

## 2022-06-07 DIAGNOSIS — Z87.01: ICD-10-CM

## 2022-06-07 DIAGNOSIS — E87.6: ICD-10-CM

## 2022-06-07 DIAGNOSIS — H81.10: ICD-10-CM

## 2022-06-07 LAB
ALBUMIN SERPL-MCNC: 4.2 G/DL (ref 3.5–5)
ALP SERPL-CCNC: 65 U/L (ref 38–126)
ALT SERPL-CCNC: 91 U/L (ref 4–34)
ANION GAP SERPL CALC-SCNC: 10 MMOL/L
AST SERPL-CCNC: 107 U/L (ref 14–36)
BASOPHILS # BLD AUTO: 0.1 K/UL (ref 0–0.2)
BASOPHILS NFR BLD AUTO: 1 %
BUN SERPL-SCNC: 18 MG/DL (ref 7–17)
CALCIUM SPEC-MCNC: 8.8 MG/DL (ref 8.4–10.2)
CHLORIDE SERPL-SCNC: 92 MMOL/L (ref 98–107)
CO2 SERPL-SCNC: 29 MMOL/L (ref 22–30)
EOSINOPHIL # BLD AUTO: 0.1 K/UL (ref 0–0.7)
EOSINOPHIL NFR BLD AUTO: 1 %
ERYTHROCYTE [DISTWIDTH] IN BLOOD BY AUTOMATED COUNT: 4.49 M/UL (ref 3.8–5.4)
ERYTHROCYTE [DISTWIDTH] IN BLOOD: 14.6 % (ref 11.5–15.5)
GLUCOSE SERPL-MCNC: 250 MG/DL (ref 74–99)
HCT VFR BLD AUTO: 37.1 % (ref 34–46)
HGB BLD-MCNC: 11.7 GM/DL (ref 11.4–16)
LYMPHOCYTES # SPEC AUTO: 2.9 K/UL (ref 1–4.8)
LYMPHOCYTES NFR SPEC AUTO: 36 %
MAGNESIUM SPEC-SCNC: 1.1 MG/DL (ref 1.6–2.3)
MCH RBC QN AUTO: 26.1 PG (ref 25–35)
MCHC RBC AUTO-ENTMCNC: 31.5 G/DL (ref 31–37)
MCV RBC AUTO: 82.8 FL (ref 80–100)
MONOCYTES # BLD AUTO: 0.5 K/UL (ref 0–1)
MONOCYTES NFR BLD AUTO: 6 %
NEUTROPHILS # BLD AUTO: 4.2 K/UL (ref 1.3–7.7)
NEUTROPHILS NFR BLD AUTO: 53 %
PLATELET # BLD AUTO: 281 K/UL (ref 150–450)
POTASSIUM SERPL-SCNC: 3.2 MMOL/L (ref 3.5–5.1)
PROT SERPL-MCNC: 7.4 G/DL (ref 6.3–8.2)
SODIUM SERPL-SCNC: 131 MMOL/L (ref 137–145)
WBC # BLD AUTO: 7.9 K/UL (ref 3.8–10.6)

## 2022-06-07 PROCEDURE — 84443 ASSAY THYROID STIM HORMONE: CPT

## 2022-06-07 PROCEDURE — 96374 THER/PROPH/DIAG INJ IV PUSH: CPT

## 2022-06-07 PROCEDURE — 96372 THER/PROPH/DIAG INJ SC/IM: CPT

## 2022-06-07 PROCEDURE — 83721 ASSAY OF BLOOD LIPOPROTEIN: CPT

## 2022-06-07 PROCEDURE — 93880 EXTRACRANIAL BILAT STUDY: CPT

## 2022-06-07 PROCEDURE — 93306 TTE W/DOPPLER COMPLETE: CPT

## 2022-06-07 PROCEDURE — 83735 ASSAY OF MAGNESIUM: CPT

## 2022-06-07 PROCEDURE — 80061 LIPID PANEL: CPT

## 2022-06-07 PROCEDURE — 80053 COMPREHEN METABOLIC PANEL: CPT

## 2022-06-07 PROCEDURE — 85027 COMPLETE CBC AUTOMATED: CPT

## 2022-06-07 PROCEDURE — 70551 MRI BRAIN STEM W/O DYE: CPT

## 2022-06-07 PROCEDURE — 80048 BASIC METABOLIC PNL TOTAL CA: CPT

## 2022-06-07 PROCEDURE — 85025 COMPLETE CBC W/AUTO DIFF WBC: CPT

## 2022-06-07 PROCEDURE — 99285 EMERGENCY DEPT VISIT HI MDM: CPT

## 2022-06-07 PROCEDURE — 80320 DRUG SCREEN QUANTALCOHOLS: CPT

## 2022-06-07 PROCEDURE — 36415 COLL VENOUS BLD VENIPUNCTURE: CPT

## 2022-06-07 PROCEDURE — 93005 ELECTROCARDIOGRAM TRACING: CPT

## 2022-06-07 PROCEDURE — 70450 CT HEAD/BRAIN W/O DYE: CPT

## 2022-06-07 PROCEDURE — 83036 HEMOGLOBIN GLYCOSYLATED A1C: CPT

## 2022-06-07 NOTE — ED
General Adult HPI





- General


Chief complaint: Syncope


Stated complaint: Syncope


Time Seen by Provider: 06/07/22 21:33


Source: patient, EMS


Mode of arrival: EMS





- History of Present Illness


Initial comments: 


Dictation was produced using dragon dictation software. please excuse any 

grammatical, word or spelling errors. 











Chief Complaint: 76-year-old female past medical history diabetes, dyslipidemia 

and liver transplant presents to the emergency department for episode of 

dizziness





History of Present Illness: Is a 76-year-old female she is been her usual state 

of health most of the day.  They went to a pizza place for dinner.  She states 

she had a wonderful dinner when she got home she all of a sudden felt an episode

of dizziness.  Denies sensation of the room spinning.  States that she has 

history of vertigo and takes Antivert.  Patient has no other symptoms.  Denies 

any shortness of breath, chest pain or abdominal pain.  No nausea or vomiting.  

Patient states that since being in emergency department her symptoms improved 

greatly.  Patient has a history of hypertension. 





The ROS documented in this emergency department record has been reviewed and 

confirmed by me.  Those systems with pertinent positive or negative responses 

have been documented in the HPI.  All other systems are other negative and/or 

noncontributory.








PHYSICAL EXAM:


General Impression: Alert and oriented x3, not in acute distress


HEENT: Normocephalic atraumatic, extra-ocular movements intact, pupils equal and

reactive to light bilaterally, mucous membranes moist.


Cardiovascular: Heart regular rate and rhythm


Chest: Able to complete full sentences, no retractions, no tachypnea


Abdomen: abdomen soft, non-tender, non-distended, no organomegaly


Musculoskeletal: Pulses present and equal in all extremities, no peripheral 

edema


Motor:  no focal deficits noted


Neurological: CN II-XII grossly intact, no focal motor or sensory deficits noted


Skin: Intact with no visualized rashes


Psych: Normal affect and mood





ED course: 76-year-old female presents emergency department for chief complaint 

of dizziness.  Her dizziness started acutely after dinner.  He had pizza.  

Patient states that since being in emergency department her symptoms 

significantly improved.  She does still feel some lightheadedness.  Denies any 

vertigo.  She has no neurologic deficits.  Vital signs upon arrival shows blood 

pressure 203/90, rest of vital signs within acceptable limits.  





Patient's  is at bedside provides a completely different history from 

what patient provided.   reports that he came into the house and found 

her lying down the floor.  He states that she had approximately 15-20 minutes 

episode of aphasia.  EMS was called.  Patient was found to be aphasic by EMS.  FRED baer the bedside  reports that patient looks significantly improved.  He 

states that her blood pressure is not usually this high.





Review blood pressure is 180/88.  Patient reevaluated bedside at 10:50 PM found 

to be stable medical condition.  Patient does not have any focal neurologic 

deficits.  Patient clinical presentation concerning for transient ischemic 

attack given that  reports that patient had an episode of aphasia similar

to a previous episode where she received thrombolytics.  Computed tomography 

scan shows no acute processes.  Patient given aspirin.  She is not having active

neurologic symptoms upon reevaluation.  Electrolytes replaced with oral 

replacement.





EKG interpretation: Ventricular rate 70, sinus rhythm,.  Interval 189, , 

QTc 448. No RI prolongation, no QTC prolongation, no ST or T-wave changes noted.

 EKG compared to 11/17/2021 showing no changes.  Overall, this EKG is 

unremarkable








- Related Data


                                Home Medications











 Medication  Instructions  Recorded  Confirmed


 


Acetaminophen Tab [Tylenol] 1,000 mg PO BID PRN 11/11/20 11/17/21


 


Calcium Carbonate/Vitamin D3 1 tab PO DAILY 11/11/20 11/17/21





[Calcium 500-Vit D3 5 Mcg (200 Iu)]   


 


Clopidogrel [Plavix] 75 mg PO DAILY 11/11/20 11/17/21


 


Glucosamine/Chondr Coffey A Sod [Osteo 2 tab PO DAILY 11/11/20 11/17/21





Bi-Flex Caplet]   


 


Insulin Glargine [Lantus Vial] 40 unit SQ DAILY 11/11/20 11/17/21


 


Levothyroxine Sodium [Synthroid] 50 mcg PO DAILY 11/11/20 11/17/21


 


Lisinopril [Prinivil] 10 mg PO DAILY 11/11/20 11/17/21


 


Magnesium Oxide 400 mg PO DAILY 11/11/20 11/17/21


 


Metoprolol Tartrate [Lopressor] 50 mg PO BID 11/11/20 11/17/21


 


Mycophenolate Sodium [Mycophenolic 360 mg PO BID 11/11/20 11/17/21





Acid]   


 


Pantoprazole Sodium [Protonix] 40 mg PO AC-BRKFST 11/11/20 11/17/21


 


Vit C/E/Zn/Coppr/Lutein/Zeaxan 1 cap PO DAILY 11/11/20 11/17/21





[Preservision Areds 2 Softgel]   


 


cycloSPORINE [SandIMMUNE] 25 mg PO DAILY 11/11/20 11/17/21


 


cycloSPORINE [SandIMMUNE] 50 mg PO HS 11/11/20 11/17/21


 


sitaGLIPtin [Januvia] 100 mg PO DAILY 11/11/20 11/17/21


 


Famotidine [Pepcid] 20 mg PO BID 11/17/21 11/17/21


 


Fluoride (Sodium) [Sodium Fluoride 1 applic DENTAL BID 11/17/21 11/17/21





5000 Plus]   


 


Insulin Regular, Human [Novolin R See Protocol SQ AC-TID 11/17/21 11/17/21





Flexpen]   


 


Meclizine [Antivert] 25 mg PO BID 11/17/21 11/17/21


 


Omega-3 Acid Ethyl Esters [Lovaza] 2 gm PO BID 11/17/21 11/17/21


 


Rosuvastatin [Crestor] 10 mg PO HS 11/17/21 11/17/21








                                  Previous Rx's











 Medication  Instructions  Recorded


 


Cholecalciferol [Vitamin D3 (25 50 mcg PO DAILY #60 tablet 11/28/21





Mcg = 1000 Iu)]  


 


Dexamethasone [Decadron] 6 mg PO DAILY 5 Days #5 tablet 11/28/21


 


Famotidine [Pepcid] 20 mg PO BID #30 tab 11/28/21


 


Zinc Sulfate [Orazinc] 220 mg PO DAILY #30 cap 11/28/21











                                    Allergies











Allergy/AdvReac Type Severity Reaction Status Date / Time


 


Iodinated Contrast Media Allergy  Rash/Hives Verified 11/17/21 12:08


 


pioglitazone [From Actos] Allergy  Rash/Hives Verified 11/17/21 12:08


 


pentazocine [From Talwin] AdvReac  Vomiting Verified 11/17/21 12:08














Review of Systems


ROS Statement: 


Those systems with pertinent positive or pertinent negative responses have been 

documented in the HPI.





ROS Other: All systems not noted in ROS Statement are negative.





Past Medical History


Past Medical History: CVA/TIA, Hypertension


History of Any Multi-Drug Resistant Organisms: None Reported


Past Surgical History: Appendectomy, Cholecystectomy, Hysterectomy


Additional Past Surgical History / Comment(s): D&C, liver transplant


Past Anesthesia/Blood Transfusion Reactions: No Reported Reaction


Past Psychological History: No Psychological Hx Reported


Smoking Status: Never smoker


Past Alcohol Use History: None Reported


Past Drug Use History: None Reported





Course


                                   Vital Signs











  06/07/22 06/07/22 06/07/22





  21:23 21:28 22:47


 


Temperature  98 F 


 


Pulse Rate 70 69 69


 


Respiratory 18 18 18





Rate   


 


Blood Pressure 203/90  180/88


 


O2 Sat by Pulse 96 96 96





Oximetry   














Medical Decision Making





- Lab Data


Result diagrams: 


                                 06/07/22 21:46





                                 06/07/22 21:46


                                   Lab Results











  06/07/22 06/07/22 Range/Units





  21:46 21:46 


 


WBC  7.9   (3.8-10.6)  k/uL


 


RBC  4.49   (3.80-5.40)  m/uL


 


Hgb  11.7   (11.4-16.0)  gm/dL


 


Hct  37.1   (34.0-46.0)  %


 


MCV  82.8   (80.0-100.0)  fL


 


MCH  26.1   (25.0-35.0)  pg


 


MCHC  31.5   (31.0-37.0)  g/dL


 


RDW  14.6   (11.5-15.5)  %


 


Plt Count  281   (150-450)  k/uL


 


MPV  7.4   


 


Neutrophils %  53   %


 


Lymphocytes %  36   %


 


Monocytes %  6   %


 


Eosinophils %  1   %


 


Basophils %  1   %


 


Neutrophils #  4.2   (1.3-7.7)  k/uL


 


Lymphocytes #  2.9   (1.0-4.8)  k/uL


 


Monocytes #  0.5   (0-1.0)  k/uL


 


Eosinophils #  0.1   (0-0.7)  k/uL


 


Basophils #  0.1   (0-0.2)  k/uL


 


Poikilocytosis  Slight   


 


Sodium   131 L  (137-145)  mmol/L


 


Potassium   3.2 L  (3.5-5.1)  mmol/L


 


Chloride   92 L  ()  mmol/L


 


Carbon Dioxide   29  (22-30)  mmol/L


 


Anion Gap   10  mmol/L


 


BUN   18 H  (7-17)  mg/dL


 


Creatinine   0.86  (0.52-1.04)  mg/dL


 


Est GFR (CKD-EPI)AfAm   77  (>60 ml/min/1.73 sqM)  


 


Est GFR (CKD-EPI)NonAf   67  (>60 ml/min/1.73 sqM)  


 


Glucose   250 H  (74-99)  mg/dL


 


Calcium   8.8  (8.4-10.2)  mg/dL


 


Magnesium   1.1 L  (1.6-2.3)  mg/dL


 


Total Bilirubin   0.9  (0.2-1.3)  mg/dL


 


AST   107 H  (14-36)  U/L


 


ALT   91 H  (4-34)  U/L


 


Alkaline Phosphatase   65  ()  U/L


 


Total Protein   7.4  (6.3-8.2)  g/dL


 


Albumin   4.2  (3.5-5.0)  g/dL














Disposition


Clinical Impression: 


 Altered mental status





Disposition: ADMITTED AS IP TO THIS [facility]


Condition: Fair


Referrals: 


CALLI Evans III, MD [Primary Care Provider] - 1-2 days


Decision Time: 22:56

## 2022-06-07 NOTE — CT
EXAMINATION TYPE: CT brain wo con

 

DATE OF EXAM: 6/7/2022

 

COMPARISON: 11/24/2021

 

HISTORY: altered mental status

 

CT DLP: 1243.4 mGycm

Automated exposure control for dose reduction was used.

 

There is cerebral cortical atrophy. There is no mass effect or midline shift. No sign of intracranial
 hemorrhage. The calvarium is intact. There is normal aeration of the mastoid sinuses. Skull base is 
intact.

 

IMPRESSION:

Cerebral atrophy. No acute intracranial abnormality. No change.

## 2022-06-08 LAB
ANION GAP SERPL CALC-SCNC: 12 MMOL/L
BUN SERPL-SCNC: 15 MG/DL (ref 7–17)
CALCIUM SPEC-MCNC: 8.9 MG/DL (ref 8.4–10.2)
CHLORIDE SERPL-SCNC: 96 MMOL/L (ref 98–107)
CHOLEST SERPL-MCNC: 159 MG/DL (ref 0–200)
CO2 SERPL-SCNC: 24 MMOL/L (ref 22–30)
GLUCOSE BLD-MCNC: 264 MG/DL (ref 75–99)
GLUCOSE BLD-MCNC: 293 MG/DL (ref 75–99)
GLUCOSE BLD-MCNC: 312 MG/DL (ref 75–99)
GLUCOSE BLD-MCNC: 341 MG/DL (ref 75–99)
GLUCOSE BLD-MCNC: 346 MG/DL (ref 75–99)
GLUCOSE SERPL-MCNC: 272 MG/DL (ref 74–99)
HDLC SERPL-MCNC: 23.7 MG/DL (ref 40–60)
LDLC SERPL DIRECT ASSAY-MCNC: 55.3 MG/DL (ref 0–129)
MAGNESIUM SPEC-SCNC: 2 MG/DL (ref 1.6–2.3)
POTASSIUM SERPL-SCNC: 3.7 MMOL/L (ref 3.5–5.1)
SODIUM SERPL-SCNC: 132 MMOL/L (ref 137–145)

## 2022-06-08 RX ADMIN — MAGNESIUM SULFATE IN DEXTROSE SCH MLS/HR: 10 INJECTION, SOLUTION INTRAVENOUS at 04:06

## 2022-06-08 RX ADMIN — METOPROLOL TARTRATE SCH MG: 50 TABLET, FILM COATED ORAL at 21:35

## 2022-06-08 RX ADMIN — MECLIZINE HYDROCHLORIDE SCH MG: 25 TABLET ORAL at 10:09

## 2022-06-08 RX ADMIN — METOPROLOL TARTRATE SCH MG: 50 TABLET, FILM COATED ORAL at 10:09

## 2022-06-08 RX ADMIN — Medication SCH MG: at 10:09

## 2022-06-08 RX ADMIN — THERA TABS SCH EACH: TAB at 10:09

## 2022-06-08 RX ADMIN — LEVOTHYROXINE SODIUM SCH MCG: 50 TABLET ORAL at 10:19

## 2022-06-08 RX ADMIN — PANTOPRAZOLE SODIUM SCH MG: 40 TABLET, DELAYED RELEASE ORAL at 10:19

## 2022-06-08 RX ADMIN — ASPIRIN 81 MG CHEWABLE TABLET SCH MG: 81 TABLET CHEWABLE at 10:09

## 2022-06-08 RX ADMIN — MAGNESIUM SULFATE IN DEXTROSE SCH MLS/HR: 10 INJECTION, SOLUTION INTRAVENOUS at 05:03

## 2022-06-08 RX ADMIN — CEFAZOLIN SCH: 330 INJECTION, POWDER, FOR SOLUTION INTRAMUSCULAR; INTRAVENOUS at 21:40

## 2022-06-08 RX ADMIN — HEPARIN SODIUM SCH UNIT: 5000 INJECTION INTRAVENOUS; SUBCUTANEOUS at 08:00

## 2022-06-08 RX ADMIN — MYCOPHENOLIC ACID SCH MG: 180 TABLET, DELAYED RELEASE ORAL at 11:01

## 2022-06-08 RX ADMIN — CLOPIDOGREL BISULFATE SCH MG: 75 TABLET ORAL at 10:09

## 2022-06-08 RX ADMIN — CEFAZOLIN SCH MLS/HR: 330 INJECTION, POWDER, FOR SOLUTION INTRAMUSCULAR; INTRAVENOUS at 01:03

## 2022-06-08 RX ADMIN — MYCOPHENOLIC ACID SCH MG: 180 TABLET, DELAYED RELEASE ORAL at 21:35

## 2022-06-08 RX ADMIN — Medication SCH MG: at 10:19

## 2022-06-08 RX ADMIN — INSULIN ASPART SCH UNIT: 100 INJECTION, SOLUTION INTRAVENOUS; SUBCUTANEOUS at 13:22

## 2022-06-08 RX ADMIN — INSULIN ASPART SCH UNIT: 100 INJECTION, SOLUTION INTRAVENOUS; SUBCUTANEOUS at 21:36

## 2022-06-08 RX ADMIN — HEPARIN SODIUM SCH UNIT: 5000 INJECTION INTRAVENOUS; SUBCUTANEOUS at 16:32

## 2022-06-08 RX ADMIN — MECLIZINE HYDROCHLORIDE SCH MG: 25 TABLET ORAL at 21:35

## 2022-06-08 RX ADMIN — INSULIN DETEMIR SCH UNIT: 100 INJECTION, SOLUTION SUBCUTANEOUS at 10:19

## 2022-06-08 RX ADMIN — INSULIN ASPART SCH UNIT: 100 INJECTION, SOLUTION INTRAVENOUS; SUBCUTANEOUS at 17:49

## 2022-06-08 RX ADMIN — INSULIN ASPART SCH UNIT: 100 INJECTION, SOLUTION INTRAVENOUS; SUBCUTANEOUS at 08:00

## 2022-06-08 NOTE — CA
Transthoracic Echo Report 

 Name: Marycarmen Marroquin 

 MRN:    [id number] 

 Age:    76     Gender:     F 

 

 :    1945 

 Exam Date:     2022 09:27 

 Exam Location: [facility] 

 Ht (in):     61     Wt (lb):     150 

 Ordering Physician:        Kaz Nielsen MD 

 Attending/Referring Phys: 

 Technician         Amira Cunningham RDCS 

 Procedure CPT: 

 Indications:       Suspected TIA 

 

 Cardiac Hx: 

 Technical Quality:      Fair 

 Contrast 1:                                Total Dose (mL): 

 Contrast 2:                                Total Dose (mL): 

 

 MEASUREMENTS  (Male / Female) Normal Values 

 2D ECHO 

 LV Diastolic Diameter PLAX        3.9 cm                4.2 - 5.9 / 3.9 - 5.3 cm 

 LV Systolic Diameter PLAX         1.9 cm                 

 IVS Diastolic Thickness           1.3 cm                0.6 - 1.0 / 0.6 - 0.9 cm 

 LVPW Diastolic Thickness          1.4 cm                0.6 - 1.0 / 0.6 - 0.9 cm 

 LV Relative Wall Thickness        0.7                    

 RV Internal Dim ED PLAX           2.3 cm                 

 LA Volume                         46.7 cm???              18 - 58 / 22 - 52 cm??? 

 

 M-MODE 

 Aortic Root Diameter MM           2.5 cm                 

 LA Systolic Diameter MM           4.5 cm                 

 LA Ao Ratio MM                    1.8                    

 AV Cusp Separation MM             1.3 cm                 

 

 DOPPLER 

 AV Peak Velocity                  229.8 cm/s             

 AV Peak Gradient                  21.1 mmHg              

 AV Mean Velocity                  192.6 cm/s             

 AV Mean Gradient                  15.7 mmHg              

 AV Velocity Time Integral         54.3 cm                

 LVOT Peak Velocity                135.8 cm/s             

 LVOT Peak Gradient                7.4 mmHg               

 MV Area PHT                       3.4 cm???                

 Mitral E Point Velocity           90.9 cm/s              

 Mitral A Point Velocity           112.4 cm/s             

 Mitral E to A Ratio               0.8                    

 MV Deceleration Time              221.9 ms               

 TR Peak Velocity                  260.9 cm/s             

 TR Peak Gradient                  27.2 mmHg              

 Right Ventricular Systolic Press  32.0 mmHg              

 

 

 FINDINGS 

 Left Ventricle 

 Mildly increased left ventricular wall thickness. Normal left ventricular  

 systolic function with no obvious regional wall motion abnormalities. Normal  

 left ventricular diastolic filling pattern. Left ventricular ejection fraction  

 is estimated at 55-60 %. 

 

 Right Ventricle 

 Normal right ventricular size and function. Right ventricular systolic pressure  

 within normal limits. 

 

 Right Atrium 

 Normal right atrial size. 

 

 Left Atrium 

 Normal left atrial size. No evidence for an atrial septal defect. 

 

 Mitral Valve 

 Structurally normal mitral valve. No mitral stenosis, regurgitation or  

 prolapse. 

 

 Aortic Valve 

 Aortic valve sclerosis. Mild aortic stenosis with a peak gradient of 21 mmHg  

 and a mean gradient of 16 mmHg. No aortic regurgitation. 

 

 Tricuspid Valve 

 Structurally normal tricuspid valve. Mild tricuspid regurgitation. 

 

 Pulmonic Valve 

 Structurally normal pulmonic valve. Trace pulmonic regurgitation. 

 

 Pericardium 

 No pericardial effusion. 

 

 Aorta 

 Normal size aortic root and proximal ascending aorta. 

 

 CONCLUSIONS 

 #1.  Normal left in because size and function.  Mild concentric hypertrophy. 

 

 #2.  Sclerosis of the leaflets with reduced opening excursion and mild gradient  

 consistent with mild aortic stenosis 

 Previewed by:  

 Dr. Elda Bourgeois MD 

 (Electronically Signed) 

 Final Date:      2022 10:59

## 2022-06-08 NOTE — P.CNNES
History of Present Illness


Consult date: 06/08/22


Requesting physician: Kevin Noel


Reason for Consult: suspect TIA


History of Present Illness: 


This is a 76-year-old woman medical history of CVA s/p IV tpa (8 years ago), 

hypertension diabetes, dyslipidemia, [facility] s/p liver transplant in 2011, COVID-19

pneumonia 11/2021, vertigo, hypothyroidism who presented emergency department on

06/07/2022 because of the sudden onset of dizziness and difficulty getting her 

words out.  Patient stated that yesterday she was doing well then she went out 

for dinner and then when she got home around 8pm she had an episode of dizziness

and felt the room is spinning.  She also had difficulty getting her words out.  

She denies of any nausea, vomiting, visual disturbance, focal weakness or 

numbness.  She states her symptoms has resolved and is back to baseline.  


Some the patient will medication consist of Plavix 75 mg daily, Lipitor 40 mg 

daily at bedtime.  Patient stated that the she is been on prednisone last 

prednisone was used about 3 weeks ago because of her COVID-19 pneumonia to 

alleviate the scar.  She was also in the past on prednisone for her left eye 

issue.  She did state that her blood sugar has been elevated as well as her 

blood pressure has been elevated and uncontrolled and she's not on any 

medication for blood pressure





Of note patient is on cyclosporine and mycophenolate for her liver transplant.  

She was notified that by her hepatologist to consume 2-3 cups of coffee a day to

help with the liver transplant which she has.





Some of the workup during this hospital visit consisted of:


Initial vital signs is blood pressure of 203/90, heart rate of 70, respiratory 

of 18, temperature of 98.0 Fahrenheit oral and pulse ox of 96% room air.  

Repeated blood pressure is 180/88


CBC with differential is unremarkable


Chemistry panel is initial serum glucose is 250.  Highest POC glucose is 293.  

AST of 107 ALT of 91.  Sodium is 131.


CT of the head is reported as cerebral atrophy.  No acute intracranial 

abnormality.  No change I personally reviewed the CT of the head and there is no

acute subacute ischemia seems the patient has a small vessel disease that seems 

old.








Review of Systems


Review of system:  The 12 point system was reviewed and apparent positive and 

negative per HPI.








Past Medical History


Past Medical History: CVA/TIA, Diabetes Mellitus, Hypertension, Thyroid Disorder


History of Any Multi-Drug Resistant Organisms: None Reported


Past Surgical History: Appendectomy, Cholecystectomy, Hysterectomy


Additional Past Surgical History / Comment(s): D&C, liver transplant


Past Anesthesia/Blood Transfusion Reactions: No Reported Reaction


Past Psychological History: No Psychological Hx Reported


Smoking Status: Never smoker


Past Alcohol Use History: None Reported


Past Drug Use History: None Reported





- Past Family History


  ** Mother


Family Medical History: Liver Disease





Medications and Allergies


                                Home Medications











 Medication  Instructions  Recorded  Confirmed  Type


 


Acetaminophen Tab [Tylenol] 1,000 mg PO BID PRN 11/11/20 06/07/22 History


 


Calcium Carbonate/Vitamin D3 1 tab PO BID 11/11/20 06/07/22 History





[Calcium 500-Vit D3 5 Mcg (200 Iu)]    


 


Clopidogrel [Plavix] 75 mg PO DAILY 11/11/20 06/07/22 History


 


Glucosamine/Chondr Coffey A Sod [Osteo 2 tab PO DAILY 11/11/20 06/07/22 History





Bi-Flex Caplet]    


 


Insulin Glargine [Lantus Vial] 40 unit SQ DAILY 11/11/20 06/07/22 History


 


Levothyroxine Sodium [Synthroid] 50 mcg PO DAILY 11/11/20 06/07/22 History


 


Lisinopril [Prinivil] 10 mg PO DAILY 11/11/20 06/07/22 History


 


Magnesium Oxide 400 mg PO DAILY 11/11/20 06/07/22 History


 


Metoprolol Tartrate [Lopressor] 50 mg PO BID 11/11/20 06/07/22 History


 


Mycophenolate Sodium [Mycophenolic 360 mg PO BID 11/11/20 06/07/22 History





Acid]    


 


Pantoprazole Sodium [Protonix] 40 mg PO AC-BRKFST 11/11/20 06/07/22 History


 


Vit C/E/Zn/Coppr/Lutein/Zeaxan 1 cap PO DAILY 11/11/20 06/07/22 History





[Preservision Areds 2 Softgel]    


 


sitaGLIPtin [Januvia] 100 mg PO DAILY 11/11/20 06/07/22 History


 


Insulin Regular, Human [Novolin R See Protocol SQ AC-TID 11/17/21 06/07/22 

History





Flexpen]    


 


Meclizine [Antivert] 25 mg PO BID 11/17/21 06/07/22 History


 


Amoxicillin 2,000 mg PO ONCE PRN 06/07/22 06/07/22 History


 


Atorvastatin [Lipitor] 40 mg PO HS 06/07/22 06/07/22 History


 


Cephalexin [Keflex] 250 mg PO DAILY 06/07/22 06/07/22 History


 


Fenofibrate [Lofibra] 54 mg PO DAILY 06/07/22 06/07/22 History


 


Furosemide [Lasix] 20 mg PO DAILY PRN 06/07/22 06/07/22 History


 


Multivitamins, Thera [Multivitamin 1 tab PO DAILY 06/07/22 06/07/22 History





(formulary)]    


 


Zinc Gluconate [Zinc] 50 mg PO DAILY 06/07/22 06/07/22 History


 


Cyclosporine, Modified [Gengraf] 25 mg PO DAILY 06/08/22 06/08/22 History


 


cycloSPORINE, MODIFIED [Neoral] 50 mg PO HS 06/08/22 06/08/22 History








                                    Allergies











Allergy/AdvReac Type Severity Reaction Status Date / Time


 


Iodinated Contrast Media Allergy  Rash/Hives Verified 11/17/21 12:08


 


pioglitazone [From Actos] Allergy  Rash/Hives Verified 11/17/21 12:08


 


pentazocine [From Talwin] AdvReac  Vomiting Verified 11/17/21 12:08














Physical Examination





- Vital Signs


Vital Signs: 


                                   Vital Signs











  Temp Pulse Resp BP Pulse Ox


 


 06/08/22 06:11   64  18  185/86  96


 


 06/08/22 04:13   65  18  185/88  97


 


 06/08/22 01:05   67  14  171/85  97


 


 06/07/22 22:47   69  18  180/88  96


 


 06/07/22 21:28  98 F  69  18   96


 


 06/07/22 21:23   70  18  203/90  96








                                Intake and Output











 06/07/22 06/08/22 06/08/22





 22:59 06:59 14:59


 


Other:   


 


  Weight 68.039 kg  68.039 kg











GENERAL: The patient is lying in bed and is not in acute distress.


CHEST: The heart rate is regular rate rhythm.   No murmurs to auscultation.  


LUNG: Clear to auscultation bilaterally no wheezing noted throughout.  Not 

labored breathing.


ABDOMEN/GI: Bowel sounds present in all 4 quadrants. No tenderness to palpation 

throughout.





NEUROLOGICAL:


Higher mental function: The patient is awake, alert, oriented to self, place and

time.  Patient is following commands.  No aphasia and no neglect.


Cranial nerves: The pupils are round, equal and reactive to light and 

accommodation.  Visual fields are full to confrontation throughout.  Extraocular

movement is intact no nystagmus is noted.  Facial sensation is normal to touch 

throughout.  The facial strength is normal throughout.  Hearing is normal 

bilaterally to hand rub.  Tongue is midline and moved side-to-side without any 

difficulty.  No dysarthria is noted.  Shoulder shrug is normal bilaterally.


Motor:  Gait is normal. The strength is 5 over 5 throughout.  Normal tone and 

bulk.  


Cerebellum: Normal finger to nose heel to shin bilaterally.


Sensation: Sensation is normal to touch throughout.


Reflexes (right/left): 2+ throughout.


Plantars are downgoing bilaterally. 








Results





- Laboratory Findings


CBC and BMP: 


                                 06/07/22 21:46





                                 06/08/22 07:27


Abnormal Lab Findings: 


                                  Abnormal Labs











  06/07/22 06/08/22 06/08/22





  21:46 04:10 07:40


 


Sodium  131 L  


 


Potassium  3.2 L  


 


Chloride  92 L  


 


BUN  18 H  


 


Glucose  250 H  


 


POC Glucose (mg/dL)   264 H  293 H


 


Magnesium  1.1 L  


 


AST  107 H  


 


ALT  91 H  














Assessment and Plan


Assessment: 





Hypertensive emergency


Acute transient Vertigo and her expressive aphasia likely due to uncontrolled 

hypertension.  Cannot rule out TIA.


Diabetes mellitus and on presentation it seems uncontrolled as high as 290's 

(was on Prednisone for COVID-19 pneumonia last used was 3 weeks ago)


History of [facility] s/p tpa about 8 years ago


Dyslipidemia


History of hypertension


[facility] s/p Liver transplant in 2011


Hypothyroidism





Plan: 





I ordered carotid duplex, lipid panel, hemoglobin A1c, TSH. I ordered alcohol 

level.


I ordered MRI of the brain to rule outs any acute ischemic stroke/or PRES as 

result of her uncontrolled hypertension ans since on cylcospoine.


2-D echo is ordered and is pending


Patient is continued on her home dose of Plavix 75 mg daily in addition primary 

team added aspirin 81 mg daily and resumed her home dose of Lipitor 40 mg daily 

at bedtime.


Every 4 hours neuro checks


On cardiac monitoring


Recommend slowly tapering down the blood pressure to avoid any stroke. 


We'll defer the rest of medical management to primary team


For DVT prophylaxis the patient is on subcu heparin 5000 units every 8 hours.


Patient to continue to follow-up with her neurologist team (Dr. Eubanks).





The plan is discussed with patient and her nurse.


Thank you for the consultation.





Anthony Lorenzana M.D.


[facility]hospital








Time with Patient: Greater than 30

## 2022-06-08 NOTE — P.PN
Subjective


Progress Note Date: 06/08/22





Hospital Course:





The patient is 76-year-old female with a PMH of liver transplant, BPPV, type II 

DM, and history of CVA, with no residual deficits presents to the emergency room

after an episode of lightheadedness and confusion.  The patient reports that she

was in her usual state of health until about 6 PM tonight as she was returning 

home after having dinner out with her .  As she was walking through her 

dining room, she suddenly became lightheaded and vertiginous and slowly laid 

down on the ground.  The patient's  at the bedside reports that she then 

became confused, not being able to state the year and not knowing where she was.

 He subsequently activated EMS upon arrival also noted that she was confused, 

and subsequently brought her to the emergency room.  The patient did not lose 

consciousness, and denied experiencing weakness, numbness, or tingling.  She 

reports that her symptoms gradually improved after arrival at the emergency 

room.  She denied experiencing chest discomfort, shortness of breath, 

palpitations, nausea, or vomiting.  Reports that her oral intake has been at her

baseline.  Reports that her symptoms are similar to when she previously had CVA.

 CT brain in the emergency room was unremarkable.  EKG revealed sinus rhythm 

with PVCs and an incomplete right bundle branch block at 70 bpm.  Laboratory 

evaluation was remarkable for a potassium of 3.2, and magnesium 1.1, , 

and ALT 91.  NIH score 0 in the emergency room.





Physical examination:





Patient seen and fully evaluated at bedside this morning.  She is doing well.  

She is sitting up at edge of bed, she denies having any complaints.  Patient 

reports yesterday she just became very dizzy and laid herself down.  Patient 

denied ever losing any consciousness.  She reports these symptoms have fully 

resolved.  Repeat magnesium and potassium levels are normal.





General: non toxic, no distress, appears at stated age


Derm: warm, dry


Head: atraumatic, normocephalic, symmetric


Eyes: EOMI, no lid lag, anicteric sclera


Mouth: no lip lesion, mucus membranes moist


Cardiovascular: S1S2 reg, no murmur, positive posterior tibial pulse bilateral, 


Lungs: CTA bilateral, no rhonchi, no rales , no accessory muscle use


Abdominal: soft, nontender to palpation, no guarding, no appreciable 

organomegaly


Ext: no gross muscle atrophy, no edema, no contractures


Neuro: CN II-XI grossly intact, no focal neuro deficits


Psych: Alert, oriented, appropriate affect 








Assessment and plan of care:





Near syncope, confusion, unclear etiology


-Cardiac monitoring


-Echocardiogram revealing normal EF with mild concentric hypertrophy and 

sclerosis of the leaflet with reduced opening asked conversion and mild gradient

consistent with mild aortic stenosis.


-Neurology consulted, ordered MRI


-Fall precautions


-Obtain orthostatics





Hypokalemia, hypomagnesemia


-Replace and monitor





Chronic conditions: Type II DM, status post liver transplant


-Insulin sliding scale blood glucose monitoring


-Continue with home meds





DVT prophylaxis


-Heparin subcu








CODE STATUS: Full code


Discussed with: Patient and RN


Anticipated discharge date: likely tomorrow.


Anticipated discharge place: Home


A total of 35 minutes was spent on the care of this complex patient more than 

50% of the time was spent in counseling and care coordination..





Micheal Morrow NP rendered care for this patient independently, reviewed the 

findings and plan as documented in the note above.  I did not physically speak 

with or examine the patient on this date. 











Objective





- Vital Signs


Vital signs: 


                                   Vital Signs











Temp  98.7 F   06/08/22 14:14


 


Pulse  70   06/08/22 14:14


 


Resp  20   06/08/22 14:14


 


BP  150/80   06/08/22 14:14


 


Pulse Ox  94 L  06/08/22 14:14


 


FiO2      








                                 Intake & Output











 06/07/22 06/08/22 06/08/22





 18:59 06:59 18:59


 


Weight  68.039 kg 68.039 kg


 


Other:   


 


  # Voids   3














- Labs


CBC & Chem 7: 


                                 06/07/22 21:46





                                 06/08/22 07:27


Labs: 


                  Abnormal Lab Results - Last 24 Hours (Table)











  06/07/22 06/08/22 06/08/22 Range/Units





  21:46 04:10 07:27 


 


Sodium  131 L   132 L  (137-145)  mmol/L


 


Potassium  3.2 L    (3.5-5.1)  mmol/L


 


Chloride  92 L   96 L  ()  mmol/L


 


BUN  18 H    (7-17)  mg/dL


 


Glucose  250 H   272 H  (74-99)  mg/dL


 


POC Glucose (mg/dL)   264 H   (75-99)  mg/dL


 


Hemoglobin A1c     (0.0-6.0)  %


 


Magnesium  1.1 L    (1.6-2.3)  mg/dL


 


AST  107 H    (14-36)  U/L


 


ALT  91 H    (4-34)  U/L














  06/08/22 06/08/22 06/08/22 Range/Units





  07:40 10:54 12:19 


 


Sodium     (137-145)  mmol/L


 


Potassium     (3.5-5.1)  mmol/L


 


Chloride     ()  mmol/L


 


BUN     (7-17)  mg/dL


 


Glucose     (74-99)  mg/dL


 


POC Glucose (mg/dL)  293 H   312 H  (75-99)  mg/dL


 


Hemoglobin A1c   10.6 H   (0.0-6.0)  %


 


Magnesium     (1.6-2.3)  mg/dL


 


AST     (14-36)  U/L


 


ALT     (4-34)  U/L

## 2022-06-08 NOTE — P.HPIM
History of Present Illness


H&P Date: 06/08/22





The patient is 76-year-old female with a PMH of liver transplant, BPPV, type II 

DM, and history of CVA, with no residual deficits presents to the emergency room

after an episode of lightheadedness and confusion.  The patient reports that she

was in her usual state of health until about 6 PM tonight as she was returning 

home after having dinner out with her .  As she was walking through her 

dining room, she suddenly became lightheaded and vertiginous and slowly laid 

down on the ground.  The patient's  at the bedside reports that she then 

became confused, not being able to state the year and not knowing where she was.

 He subsequently activated EMS upon arrival also noted that she was confused, 

and subsequently brought her to the emergency room.  The patient did not lose 

consciousness, and denied experiencing weakness, numbness, or tingling.  She 

reports that her symptoms gradually improved after arrival at the emergency 

room.  She denied experiencing chest discomfort, shortness of breath, 

palpitations, nausea, or vomiting.  Reports that her oral intake has been at her

baseline.  Reports that her symptoms are similar to when she previously had CVA.

 CT brain in the emergency room was unremarkable.  EKG revealed sinus rhythm 

with PVCs and an incomplete right bundle branch block at 70 bpm.  Laboratory 

evaluation was remarkable for a potassium of 3.2, and magnesium 1.1, , 

and ALT 91.  NIH score 0 in the emergency room.





Review of systems:


Pertinent positives and negatives as discussed in HPI, a complete review of 

systems was performed and all other systems are negative.





Physical examination:


General: non toxic, no distress, appears at stated age, overweight


Derm: no unusual rashes/lesions no unusual ecchymoses, warm, dry


Head: atraumatic, normocephalic, symmetric


Eyes: EOMI, no lid lag, anicteric sclera, pupils equal round reactive to light


ENT: Nose and ears atraumatic, no thrush,  no pharyngeal erythema


Neck: No thyromegaly, no cervical lymphadenopathy, trachea midline, supple


Mouth: no lip lesion, mucus membranes moist


Cardiovascular: S1S2 reg, no murmur, positive posterior tibial pulse bilateral, 

no edema, capillary refill less than 2 seconds


Lungs: CTA bilateral, no rhonchi, no rales , no accessory muscle use


Abdominal: soft,  nontender to palpation, no guarding, no appreciable 

organomegaly, normal bowel sounds


Ext: no gross muscle atrophy,  muscle strength 5 out of 5 in all 4 extremities 

grossly, no contractures, 


Neuro:  CN II-XI grossly intact, light touch intact all 4 extremities, finger to

nose within normal limits,


Psych: Alert, oriented, appropriate affect 





Assessment/plan





Near syncope, confusion, unclear etiology


-Cardiac monitoring


-Echocardiogram


-Neurology consulted for possible TIA


-Fall precautions


-Obtain orthostatics





Hypokalemia, hypomagnesemia


-Replace and monitor





Chronic conditions: Type II DM, status post liver transplant


-Insulin sliding scale blood glucose monitoring


-Continue with home meds





DVT prophylaxis


-Heparin subcu





The patient is admitted with an anticipated less than 2 midnight stay for ev

aluation of near syncope.


CODE STATUS: Full code


Discussed with: Patient


Anticipated discharge date: In a.m.


Anticipated discharge place: Home








Past Medical History


Past Medical History: CVA/TIA, Hypertension


History of Any Multi-Drug Resistant Organisms: None Reported


Past Surgical History: Appendectomy, Cholecystectomy, Hysterectomy


Additional Past Surgical History / Comment(s): D&C, liver transplant


Past Anesthesia/Blood Transfusion Reactions: No Reported Reaction


Past Psychological History: No Psychological Hx Reported


Smoking Status: Never smoker


Past Alcohol Use History: None Reported


Past Drug Use History: None Reported





- Past Family History


  ** Mother


Family Medical History: Liver Disease





Medications and Allergies


                                Home Medications











 Medication  Instructions  Recorded  Confirmed  Type


 


Acetaminophen Tab [Tylenol] 1,000 mg PO BID PRN 11/11/20 06/07/22 History


 


Calcium Carbonate/Vitamin D3 1 tab PO BID 11/11/20 06/07/22 History





[Calcium 500-Vit D3 5 Mcg (200 Iu)]    


 


Clopidogrel [Plavix] 75 mg PO DAILY 11/11/20 06/07/22 History


 


Glucosamine/Chondr Coffey A Sod [Osteo 2 tab PO DAILY 11/11/20 06/07/22 History





Bi-Flex Caplet]    


 


Insulin Glargine [Lantus Vial] 40 unit SQ DAILY 11/11/20 06/07/22 History


 


Levothyroxine Sodium [Synthroid] 50 mcg PO DAILY 11/11/20 06/07/22 History


 


Lisinopril [Prinivil] 10 mg PO DAILY 11/11/20 06/07/22 History


 


Magnesium Oxide 400 mg PO DAILY 11/11/20 06/07/22 History


 


Metoprolol Tartrate [Lopressor] 50 mg PO BID 11/11/20 06/07/22 History


 


Mycophenolate Sodium [Mycophenolic 360 mg PO BID 11/11/20 06/07/22 History





Acid]    


 


Pantoprazole Sodium [Protonix] 40 mg PO AC-BRKFST 11/11/20 06/07/22 History


 


Vit C/E/Zn/Coppr/Lutein/Zeaxan 1 cap PO DAILY 11/11/20 06/07/22 History





[Preservision Areds 2 Softgel]    


 


cycloSPORINE [SandIMMUNE] 25 mg PO DAILY 11/11/20 06/07/22 History


 


cycloSPORINE [SandIMMUNE] 50 mg PO HS 11/11/20 06/07/22 History


 


sitaGLIPtin [Januvia] 100 mg PO DAILY 11/11/20 06/07/22 History


 


Insulin Regular, Human [Novolin R See Protocol SQ AC-TID 11/17/21 06/07/22 

History





Flexpen]    


 


Meclizine [Antivert] 25 mg PO BID 11/17/21 06/07/22 History


 


Amoxicillin 2,000 mg PO ONCE PRN 06/07/22 06/07/22 History


 


Atorvastatin [Lipitor] 40 mg PO HS 06/07/22 06/07/22 History


 


Cephalexin [Keflex] 250 mg PO DAILY 06/07/22 06/07/22 History


 


Fenofibrate [Lofibra] 54 mg PO DAILY 06/07/22 06/07/22 History


 


Furosemide [Lasix] 20 mg PO DAILY PRN 06/07/22 06/07/22 History


 


Multivitamins, Thera [Multivitamin 1 tab PO DAILY 06/07/22 06/07/22 History





(formulary)]    


 


Zinc Gluconate [Zinc] 50 mg PO DAILY 06/07/22 06/07/22 History








                                    Allergies











Allergy/AdvReac Type Severity Reaction Status Date / Time


 


Iodinated Contrast Media Allergy  Rash/Hives Verified 11/17/21 12:08


 


pioglitazone [From Actos] Allergy  Rash/Hives Verified 11/17/21 12:08


 


pentazocine [From Talwin] AdvReac  Vomiting Verified 11/17/21 12:08














Physical Exam


Vitals: 


                                   Vital Signs











  Temp Pulse Resp BP Pulse Ox


 


 06/08/22 01:05   67  14  171/85  97


 


 06/07/22 22:47   69  18  180/88  96


 


 06/07/22 21:28  98 F  69  18   96


 


 06/07/22 21:23   70  18  203/90  96








                                Intake and Output











 06/07/22 06/07/22 06/08/22





 14:59 22:59 06:59


 


Other:   


 


  Weight  68.039 kg 














Results


CBC & Chem 7: 


                                 06/07/22 21:46





                                 06/07/22 21:46


Labs: 


                  Abnormal Lab Results - Last 24 Hours (Table)











  06/07/22 Range/Units





  21:46 


 


Sodium  131 L  (137-145)  mmol/L


 


Potassium  3.2 L  (3.5-5.1)  mmol/L


 


Chloride  92 L  ()  mmol/L


 


BUN  18 H  (7-17)  mg/dL


 


Glucose  250 H  (74-99)  mg/dL


 


Magnesium  1.1 L  (1.6-2.3)  mg/dL


 


AST  107 H  (14-36)  U/L


 


ALT  91 H  (4-34)  U/L

## 2022-06-08 NOTE — US
EXAMINATION TYPE: US carotid duplex BILAT

 

DATE OF EXAM: 6/8/2022

 

COMPARISON: NONE

 

CLINICAL HISTORY: stroke. TIA, h/o 2 strokes prior

 

EXAM MEASUREMENTS: 

 

RIGHT:  Peak Systolic Velocity (PSV) cm/sec

----- Right CCA:  73.4  

----- Right ICA:  74.4     

----- Right ECA:  85.5   

ICA/CCA ratio:  1.0    

 

RIGHT:  End Diastole cm/sec

----- Right CCA:  8.5   

----- Right ICA:  12.4      

----- Right ECA:  0.0     

 

LEFT:  Peak Systolic Velocity (PSV) cm/sec

----- Left CCA:  58.6  

----- Left ICA:  83.2   

----- Left ECA:  98.7  

ICA/CCA ratio:  1.4  

 

LEFT:  End Diastole cm/sec

----- Left CCA:  7.7  

----- Left ICA:  10.8   

----- Left ECA:  0.0 

 

VERTEBRALS (direction of flow):

Right Vertebral: Antegrade

Left Vertebral: Antegrade

 

Rhythm:  Normal

 

 

 

 

IMPRESSION:  Mild homogeneous plaque with no significant stenosis seen

   

 

 

Criteria for Assigning % of Stenosis / Diameter reduction

(Estimation based on the indirect measurements of the internal carotid artery velocities (ICA PSV).

1.  Normal (no stenosis)=ICA PSV < 125 cm/s: ratio < 2.0: ICA EDV<40 cm/s.

2. Less than 50% stenosis=ICA PSV < 125 cm/s: ratio < 2.0: ICA EDV<40 cm/s.

3.  50 to 69% stenosis=ICA PSV of 125 to 230 cm/s: ration 2.0 ? 4.0: ICA EDV  cm/s.

4.  Greater than 70% stenosis to near occlusion= ICA PSV > 230 cm/s: ratio > 4.0: ICA EDV > 100 cm/s.
 

5.  Near occlusion= ICA PSV velocities may be low or undetectable: variable ratio and ICA EDV.

6.  Total occlusion=unable to detect flow.

## 2022-06-09 VITALS
HEART RATE: 78 BPM | DIASTOLIC BLOOD PRESSURE: 98 MMHG | SYSTOLIC BLOOD PRESSURE: 169 MMHG | TEMPERATURE: 97.8 F | RESPIRATION RATE: 18 BRPM

## 2022-06-09 LAB
ALBUMIN SERPL-MCNC: 4.1 G/DL (ref 3.8–4.9)
ALBUMIN/GLOB SERPL: 1.46 G/DL (ref 1.6–3.17)
ALP SERPL-CCNC: 69 U/L (ref 41–126)
ALT SERPL-CCNC: 85 U/L (ref 8–44)
ANION GAP SERPL CALC-SCNC: 13.9 MMOL/L (ref 10–18)
AST SERPL-CCNC: 87 U/L (ref 13–35)
BUN SERPL-SCNC: 15.2 MG/DL (ref 9–27)
BUN/CREAT SERPL: 16.89 RATIO (ref 12–20)
CALCIUM SPEC-MCNC: 9.3 MG/DL (ref 8.7–10.3)
CHLORIDE SERPL-SCNC: 92 MMOL/L (ref 96–109)
CO2 SERPL-SCNC: 25.1 MMOL/L (ref 20–27.5)
ERYTHROCYTE [DISTWIDTH] IN BLOOD BY AUTOMATED COUNT: 4.6 X 10*6/UL (ref 4.1–5.2)
ERYTHROCYTE [DISTWIDTH] IN BLOOD: 14.4 % (ref 11.5–14.5)
GLOBULIN SER CALC-MCNC: 2.8 G/DL (ref 1.6–3.3)
GLUCOSE BLD-MCNC: 292 MG/DL (ref 75–99)
GLUCOSE BLD-MCNC: 299 MG/DL (ref 75–99)
GLUCOSE SERPL-MCNC: 287 MG/DL (ref 70–110)
HCT VFR BLD AUTO: 37.8 % (ref 37.2–46.3)
HGB BLD-MCNC: 12.2 G/DL (ref 12–15)
MAGNESIUM SPEC-SCNC: 1.9 MG/DL (ref 1.5–2.4)
MCH RBC QN AUTO: 26.5 PG (ref 27–32)
MCHC RBC AUTO-ENTMCNC: 32.3 G/DL (ref 32–37)
MCV RBC AUTO: 82.2 FL (ref 80–97)
NRBC BLD AUTO-RTO: 0 /100 WBCS (ref 0–0)
PLATELET # BLD AUTO: 275 X 10*3/UL (ref 140–440)
POTASSIUM SERPL-SCNC: 4 MMOL/L (ref 3.5–5.5)
PROT SERPL-MCNC: 6.9 G/DL (ref 6.2–8.2)
SODIUM SERPL-SCNC: 131 MMOL/L (ref 135–145)
WBC # BLD AUTO: 6.18 X 10*3/UL (ref 4.5–10)

## 2022-06-09 RX ADMIN — INSULIN ASPART SCH UNIT: 100 INJECTION, SOLUTION INTRAVENOUS; SUBCUTANEOUS at 12:37

## 2022-06-09 RX ADMIN — INSULIN DETEMIR SCH UNIT: 100 INJECTION, SOLUTION SUBCUTANEOUS at 07:53

## 2022-06-09 RX ADMIN — HEPARIN SODIUM SCH UNIT: 5000 INJECTION INTRAVENOUS; SUBCUTANEOUS at 00:10

## 2022-06-09 RX ADMIN — Medication SCH MG: at 07:53

## 2022-06-09 RX ADMIN — Medication SCH MG: at 07:54

## 2022-06-09 RX ADMIN — PANTOPRAZOLE SODIUM SCH MG: 40 TABLET, DELAYED RELEASE ORAL at 07:53

## 2022-06-09 RX ADMIN — MYCOPHENOLIC ACID SCH MG: 180 TABLET, DELAYED RELEASE ORAL at 08:57

## 2022-06-09 RX ADMIN — LEVOTHYROXINE SODIUM SCH MCG: 50 TABLET ORAL at 06:04

## 2022-06-09 RX ADMIN — THERA TABS SCH EACH: TAB at 07:53

## 2022-06-09 RX ADMIN — HEPARIN SODIUM SCH UNIT: 5000 INJECTION INTRAVENOUS; SUBCUTANEOUS at 07:53

## 2022-06-09 RX ADMIN — CLOPIDOGREL BISULFATE SCH MG: 75 TABLET ORAL at 07:54

## 2022-06-09 RX ADMIN — INSULIN ASPART SCH UNIT: 100 INJECTION, SOLUTION INTRAVENOUS; SUBCUTANEOUS at 07:53

## 2022-06-09 RX ADMIN — HEPARIN SODIUM SCH: 5000 INJECTION INTRAVENOUS; SUBCUTANEOUS at 15:16

## 2022-06-09 RX ADMIN — METOPROLOL TARTRATE SCH MG: 50 TABLET, FILM COATED ORAL at 07:53

## 2022-06-09 RX ADMIN — MECLIZINE HYDROCHLORIDE SCH MG: 25 TABLET ORAL at 07:54

## 2022-06-09 RX ADMIN — ASPIRIN 81 MG CHEWABLE TABLET SCH MG: 81 TABLET CHEWABLE at 07:53

## 2022-06-09 NOTE — P.DS
Providers


Date of admission: 


06/07/22 22:48





Expected date of discharge: 06/09/22


Attending physician: 


Kaz Nielsen MD





Consults: 





                                        





06/07/22 22:48


Consult Physician Routine 


   Consulting Provider: Anthony Lorenzana


   Consult Reason/Comments: suspect TIA


   Do you want consulting provider notified?: Yes











Primary care physician: 


[facility] Course: 





Discharge Diagnosis:





Near syncope, confusion, unclear etiology.  Neurology evaluating diagnosing with

acute transient vertigo with expressive aphasia likely secondary to uncontrolled

hypertension, unable to rule out TIA.  Patient discharged home on aspirin in 

addition to continuation of daily Plavix.  Fenofibrate also increased secondary 

to significant hypertriglyceridemia with triglycerides of 687.00.  Patient to 

follow up outpatient with neurologist as discussed.





Hypertension, monitor vital signs and continue daily medication regimen with 

metoprolol 50 mg twice daily, lisinopril 10 mg daily. 





Hypokalemia, resolved





Hypomagnesemia, resolved





Status post liver transplant 2019, continue mycophenolate sodium and 

cyclosporine





Insulin-dependent diabetes mellitus type II.  Continue daily medication regimen 

with Januvia, Novolin 3 times daily with meals and Lantus to 40 units daily.





Hypothyroidism, continue daily medication regimen with levothyroxine.








Hospital Course: 





The patient is 76-year-old female with a PMH of liver transplant, BPPV, type II 

DM, and history of CVA, with no residual deficits presents to the emergency room

after an episode of lightheadedness and confusion.  The patient reports that she

was in her usual state of health until about 6 PM tonight as she was returning 

home after having dinner out with her .  As she was walking through her 

dining room, she suddenly became lightheaded and vertiginous and slowly laid 

down on the ground.  The patient's  at the bedside reports that she then 

became confused, not being able to state the year and not knowing where she was.

 He subsequently activated EMS upon arrival also noted that she was confused, 

and subsequently brought her to the emergency room.  The patient did not lose 

consciousness, and denied experiencing weakness, numbness, or tingling.  She 

reports that her symptoms gradually improved after arrival at the emergency 

room.  She denied experiencing chest discomfort, shortness of breath, 

palpitations, nausea, or vomiting.  Reports that her oral intake has been at her

baseline.  Reports that her symptoms are similar to when she previously had CVA.

 CT brain in the emergency room was unremarkable.  EKG revealed sinus rhythm 

with PVCs and an incomplete right bundle branch block at 70 bpm.  Laboratory 

evaluation was remarkable for a potassium of 3.2, and magnesium 1.1, , 

and ALT 91.  NIH score 0 in the emergency room.  Patient was admitted under the 

services was consulted to neurology.  Echocardiogram was completed revealing 

normal EF with mild concentric hypertrophy and sclerosis of the leaflet with 

reduced opening asked conversion and mild gradient consistent with mild aortic 

stenosis.  MRI also completed, reviewed by neurologist Dr. Lorenzana stating patient

clear for discharge and recommended outpatient follow-up with neurologist.  

Patient is medically stable at this time as all symptoms continued to remain 

resolved.





Physical examination:





General: non toxic, no distress, appears at stated age


Derm: warm, dry


Head: atraumatic, normocephalic, symmetric


Eyes: EOMI, no lid lag, anicteric sclera


Mouth: no lip lesion, mucus membranes moist


Cardiovascular: S1S2 reg, no murmur, positive posterior tibial pulse bilateral, 


Lungs: CTA bilateral, no rhonchi, no rales , no accessory muscle use


Abdominal: soft, nontender to palpation, no guarding, no appreciable 

organomegaly


Ext: no gross muscle atrophy, no edema, no contractures


Neuro: CN II-XI grossly intact, no focal neuro deficits


Psych: Alert, oriented, appropriate affect 











A total of 35 minutes of time were spent preparing this complex discharge 

summary.


Pt was discharged on 6/19/22 at 4:46 PM








I reviewed the documentation as provided by the DIPAK above, who is the original 

author of this note.  I agree with the documented assessment and plan, with the 

following changes: none


Patient Condition at Discharge: Stable





Plan - Discharge Summary


Discharge Rx Participant: No


New Discharge Prescriptions: 


New


   Aspirin 81 mg PO DAILY 30 Days #30 tab


   Fenofibrate [Lofibra] 160 mg PO DAILY 30 Days #30 tab





Continue


   Glucosamine/Chondr Coffey A Sod [Osteo Bi-Flex Caplet] 2 tab PO DAILY


   Magnesium Oxide 400 mg PO DAILY


   Vit C/E/Zn/Coppr/Lutein/Zeaxan [Preservision Areds 2 Softgel] 1 cap PO DAILY


   Metoprolol Tartrate [Lopressor] 50 mg PO BID


   Lisinopril [Prinivil] 10 mg PO DAILY


   Acetaminophen Tab [Tylenol] 1,000 mg PO BID PRN


     PRN Reason: Pain


   Pantoprazole Sodium [Protonix] 40 mg PO AC-BRKFST


   Levothyroxine Sodium [Synthroid] 50 mcg PO DAILY


   Clopidogrel [Plavix] 75 mg PO DAILY


   sitaGLIPtin [Januvia] 100 mg PO DAILY


   Insulin Glargine [Lantus Vial] 40 unit SQ DAILY


   Mycophenolate Sodium [Mycophenolic Acid] 360 mg PO BID


   Calcium Carbonate/Vitamin D3 [Calcium 500-Vit D3 5 Mcg (200 Iu)] 1 tab PO BID


   Insulin Regular, Human [Novolin R Flexpen] See Protocol SQ AC-TID


   Furosemide [Lasix] 20 mg PO DAILY PRN


     PRN Reason: Edema


   cycloSPORINE, MODIFIED [Neoral] 50 mg PO HS


   Meclizine [Antivert] 25 mg PO BID


   Atorvastatin [Lipitor] 40 mg PO HS


   Multivitamins, Thera [Multivitamin (formulary)] 1 tab PO DAILY


   Zinc Gluconate [Zinc] 50 mg PO DAILY


   Cyclosporine, Modified [Gengraf] 25 mg PO DAILY





Discontinued


   Fenofibrate [Lofibra] 54 mg PO DAILY


   Amoxicillin 2,000 mg PO ONCE PRN


     PRN Reason: dental appt


   Cephalexin [Keflex] 250 mg PO DAILY


Discharge Medication List





Acetaminophen Tab [Tylenol] 1,000 mg PO BID PRN 11/11/20 [History]


Calcium Carbonate/Vitamin D3 [Calcium 500-Vit D3 5 Mcg (200 Iu)] 1 tab PO BID 

11/11/20 [History]


Clopidogrel [Plavix] 75 mg PO DAILY 11/11/20 [History]


Glucosamine/Chondr Coffey A Sod [Osteo Bi-Flex Caplet] 2 tab PO DAILY 11/11/20 

[History]


Insulin Glargine [Lantus Vial] 40 unit SQ DAILY 11/11/20 [History]


Levothyroxine Sodium [Synthroid] 50 mcg PO DAILY 11/11/20 [History]


Lisinopril [Prinivil] 10 mg PO DAILY 11/11/20 [History]


Magnesium Oxide 400 mg PO DAILY 11/11/20 [History]


Metoprolol Tartrate [Lopressor] 50 mg PO BID 11/11/20 [History]


Mycophenolate Sodium [Mycophenolic Acid] 360 mg PO BID 11/11/20 [History]


Pantoprazole Sodium [Protonix] 40 mg PO AC-BRKFST 11/11/20 [History]


Vit C/E/Zn/Coppr/Lutein/Zeaxan [Preservision Areds 2 Softgel] 1 cap PO DAILY 

11/11/20 [History]


sitaGLIPtin [Januvia] 100 mg PO DAILY 11/11/20 [History]


Insulin Regular, Human [Novolin R Flexpen] See Protocol SQ AC-TID 11/17/21 

[History]


Meclizine [Antivert] 25 mg PO BID 11/17/21 [History]


Atorvastatin [Lipitor] 40 mg PO HS 06/07/22 [History]


Furosemide [Lasix] 20 mg PO DAILY PRN 06/07/22 [History]


Multivitamins, Thera [Multivitamin (formulary)] 1 tab PO DAILY 06/07/22 

[History]


Zinc Gluconate [Zinc] 50 mg PO DAILY 06/07/22 [History]


Cyclosporine, Modified [Gengraf] 25 mg PO DAILY 06/08/22 [History]


cycloSPORINE, MODIFIED [Neoral] 50 mg PO HS 06/08/22 [History]


Aspirin 81 mg PO DAILY 30 Days #30 tab 06/09/22 [Rx]


Fenofibrate [Lofibra] 160 mg PO DAILY 30 Days #30 tab 06/09/22 [Rx]








Follow up Appointment(s)/Referral(s): 


CALLI Evans III, MD [Primary Care Provider] - 1-2 days


Ivan Eubanks MD [Medical Doctor] - 1 Week


Activity/Diet/Wound Care/Special Instructions: 





Activity:





As tolerated.  Take breaks as needed.





Diet: 





Heart healthy and carb consistent diet. Avoid salts, or foods with hidden salts 

such as canned or boxed foods and frozen dinners. Extra salt makes your heart 

work harder and traps the fluid in your body for longer.





Special Instructions:  





Take all of your medications as directed and remember to keep all of your 

doctor's appointments and follow-up as needed.  





We will add on blood pressure medications at this time as her systolic pressures

have maintained between 140s and 160s.  However it is important to check your 

blood pressure daily and document these findings and a journal to bring with you

to your next doctor's appointment.  If you continue to run high with elevated 

systolic pressures 150s and above,  you will likely need additional blood 

pressure medication added to your daily medication regimen.





Thank you for allowing us to participate in your care, it was truly a pleasure 

having you for our patient!!! 








Discharge Disposition: HOME SELF-CARE

## 2022-06-09 NOTE — P.PN
Subjective


Progress Note Date: 06/09/22


The patient is seen at bedside and stated is doing well.  Denies of any new 

neurological issues.








Objective





- Vital Signs


Vital signs: 


                                   Vital Signs











Temp  98.5 F   06/09/22 07:58


 


Pulse  71   06/09/22 07:58


 


Resp  16   06/09/22 07:58


 


BP  145/85   06/09/22 07:58


 


Pulse Ox  94 L  06/09/22 07:58


 


FiO2      








                                 Intake & Output











 06/08/22 06/09/22 06/09/22





 18:59 06:59 18:59


 


Weight 68.039 kg  


 


Other:   


 


  # Voids 3 1 














- Exam





GENERAL: The patient is lying in bed and is not in acute distress.





NEUROLOGICAL:


Higher mental function: The patient is awake, alert, oriented to self, place and

time.  Patient is following commands.  No aphasia and no neglect.


Cranial nerves: The pupils are round, equal and reactive to light and 

accommodation.  Visual fields are full to confrontation throughout.  Extraocular

movement is intact no nystagmus is noted.  Facial sensation is normal to touch 

throughout.  The facial strength is normal throughout.  Hearing is normal 

bilaterally to hand rub.  Tongue is midline and moved side-to-side without any 

difficulty.  No dysarthria is noted.  Shoulder shrug is normal bilaterally.


Motor:  Gait is normal. The strength is 5 over 5 throughout.  Normal tone and 

bulk.  


Cerebellum: Normal finger to nose heel to shin bilaterally.


Sensation: Sensation is normal to touch throughout.


Reflexes (right/left): 2+ throughout.


Plantars are downgoing bilaterally. 








Some of the workup during this hospital visit consisted of:


Lipid panel is triglyceride of 687, cholesterol 159, LDLs 55, HDL is 23


TSH is 3.040


Hemoglobin A1c is 10.6


Serum alcohol was less than 10


CT of the head is reported as cerebral atrophy.  No acute intracranial abnorm

ality.  No change I personally reviewed the CT of the head and there is no acute

subacute ischemia seems the patient has a small vessel disease that seems old.


Carotid duplex is reported as mild homogeneous plaque with no significant 

stenosis seen.


2-D echo was reported as normal left ventricular size and function.  Mild 

concentric left ventricular hypertrophy.  Sclerosis of the leaflets with reduced

opening the excursion and mild gradient consistent with mild aortic stenosis.  

Normal left atrial size.  No evidence for atrial septal defect.











- Labs


CBC & Chem 7: 


                                 06/09/22 06:54





                                 06/09/22 06:54


Labs: 


                  Abnormal Lab Results - Last 24 Hours (Table)











  06/08/22 06/08/22 06/08/22 Range/Units





  10:54 10:54 17:27 


 


MCH     (27.0-32.0)  pg


 


Sodium     (135-145)  mmol/L


 


Chloride     ()  mmol/L


 


Glucose     ()  mg/dL


 


POC Glucose (mg/dL)    341 H  (75-99)  mg/dL


 


Hemoglobin A1c   10.6 H   (0.0-6.0)  %


 


AST     (13-35)  U/L


 


ALT     (8-44)  U/L


 


Albumin/Globulin Ratio     (1.60-3.17)  g/dL


 


Triglycerides  687.00 H    (0..00)  mg/dL


 


HDL Cholesterol  23.70 L    (40.00-60.00)  mg/dL














  06/08/22 06/09/22 06/09/22 Range/Units





  20:01 06:54 06:54 


 


MCH   26.5 L   (27.0-32.0)  pg


 


Sodium    131 L  (135-145)  mmol/L


 


Chloride    92 L  ()  mmol/L


 


Glucose    287 H  ()  mg/dL


 


POC Glucose (mg/dL)  346 H    (75-99)  mg/dL


 


Hemoglobin A1c     (0.0-6.0)  %


 


AST    87 H  (13-35)  U/L


 


ALT    85 H  (8-44)  U/L


 


Albumin/Globulin Ratio    1.46 L  (1.60-3.17)  g/dL


 


Triglycerides     (0..00)  mg/dL


 


HDL Cholesterol     (40.00-60.00)  mg/dL














  06/09/22 06/09/22 Range/Units





  07:40 11:51 


 


MCH    (27.0-32.0)  pg


 


Sodium    (135-145)  mmol/L


 


Chloride    ()  mmol/L


 


Glucose    ()  mg/dL


 


POC Glucose (mg/dL)  292 H  299 H  (75-99)  mg/dL


 


Hemoglobin A1c    (0.0-6.0)  %


 


AST    (13-35)  U/L


 


ALT    (8-44)  U/L


 


Albumin/Globulin Ratio    (1.60-3.17)  g/dL


 


Triglycerides    (0..00)  mg/dL


 


HDL Cholesterol    (40.00-60.00)  mg/dL














Assessment and Plan


Assessment: 





Hypertensive emergency


Acute transient Vertigo and her expressive aphasia likely due to uncontrolled 

hypertension.  Cannot rule out TIA.


Uncontrolled Diabetes mellitus (HbA1c: 10.6).   (was on Prednisone for COVID-19 

pneumonia last used was 3 weeks ago)


Hypertriglyceridemia


History of CVA s/p tpa about 8 years ago


History of Dyslipidemia


History of hypertension


[facility] s/p Liver transplant in 2011


Hypothyroidism





Plan: 





I ordered MRI of the brain to rule outs any acute ischemic stroke/or PRES as 

result of her uncontrolled hypertension ans since on cylcospoine.


Patient is continued on her home dose of Plavix 75 mg daily in addition primary 

team added aspirin 81 mg daily and resumed her home dose of Lipitor 40 mg daily 

at bedtime.


4 hypertriglyceridemia the patient was started on the phenol fiber 160 mg daily 

by the primary team


Every 4 hours neuro checks


On cardiac monitoring


Recommend slowly tapering down the blood pressure to avoid any stroke. 


We'll defer the rest of medical management to primary team


For DVT prophylaxis the patient is on subcu heparin 5000 units every 8 hours.


Patient to continue to follow-up with her neurologist team (Dr. Eubanks).





The plan is discussed with patient and her nurse.





Anthony Lorenzana M.D.


Neuro-hospital








Time with Patient: Less than 30

## 2022-06-10 NOTE — MR
EXAMINATION TYPE: MR brain wo con

 

DATE OF EXAM: 6/9/2022

 

COMPARISON: 11/24/2021

 

HISTORY: Vertigo.  stroke

 

Multiplanar multiecho imaging of the brain without contrast.

 

Diffusion images show no sign of an acute infarct. On the T2 and FLAIR images there are coalescent ar
eas of increased signal adjacent to the lateral ventricles. There are discrete foci of increased sign
al at the gray-white matter junction posterior vertebral hemispheres that measure up to 7 mm. Total n
umber is approximately 10.

 

The brainstem shows multiple tiny white matter high signal foci measuring up to 3 mm. The cerebellum 
is intact. There is thinning of the corpus callosum. Sella turcica is intact. There is no evidence of
 orbital mass.

 

IMPRESSION:

Cerebral atrophy. White matter changes consistent with microvascular ischemia. Demyelinating disease 
not excluded. There is not a significant change compared to old exam. There is improvement in the eth
moid sinusitis compared to old exam. No acute intracranial abnormality.

## 2022-10-11 ENCOUNTER — HOSPITAL ENCOUNTER (OUTPATIENT)
Dept: HOSPITAL 47 - RADMAMWWP | Age: 77
Discharge: HOME | End: 2022-10-11
Attending: FAMILY MEDICINE
Payer: MEDICARE

## 2022-10-11 DIAGNOSIS — Z80.3: ICD-10-CM

## 2022-10-11 DIAGNOSIS — Z12.31: Primary | ICD-10-CM

## 2022-10-11 DIAGNOSIS — Z85.89: ICD-10-CM

## 2022-10-11 DIAGNOSIS — Z78.0: ICD-10-CM

## 2022-10-11 PROCEDURE — 77063 BREAST TOMOSYNTHESIS BI: CPT

## 2022-10-11 PROCEDURE — 77067 SCR MAMMO BI INCL CAD: CPT

## 2022-10-12 NOTE — MM
Reason for Exam: Screening  (asymptomatic). 

Last screening mammogram was performed 12 month(s) ago.





Patient History: 

Menarche at age 13. First Full-Term Pregnancy at age 22. Left ovary removed at age 46. Right ovary

removed at age 46. Hysterectomy at age 46. Postmenopausal. Patient has history of breast feeding.

Other cancer. Estrogen, from age 46 until age 48.

Paternal grandmother had breast cancer. Paternal aunt had breast cancer. 





Risk Values: 

Love 5 year model risk: 1.6%.

NCI Lifetime model risk: 3.2%.





Prior Study Comparison: 

9/18/2020 Bilateral Screening Mammogram, [facility]. 10/7/2021 Bilateral Screening Mammogram, [facility].

10/11/2021 Left Diagnostic Mammogram, [facility]. 





Tissue Density: 

The breast tissue is heterogeneously dense. This may lower the sensitivity of mammography.





Findings: 

Analyzed By CAD. 

There is no suspicious group of microcalcifications or new suspicious mass in either breast. Benign

calcifications bilaterally. 





Overall Assessment: Benign, BI-RAD 2





Management: 

Screening Mammogram of both breasts in 1 year.

A clinical breast exam by your physician is recommended on an annual basis and results should be

correlated with mammographic findings.



Electronically signed and approved by: Harrison Chavez M.D. Radiologis

## 2023-01-19 ENCOUNTER — HOSPITAL ENCOUNTER (OUTPATIENT)
Dept: HOSPITAL 47 - LABWHC1 | Age: 78
Discharge: HOME | End: 2023-01-19
Attending: INTERNAL MEDICINE
Payer: MEDICARE

## 2023-01-19 DIAGNOSIS — I10: ICD-10-CM

## 2023-01-19 DIAGNOSIS — E11.65: ICD-10-CM

## 2023-01-19 DIAGNOSIS — R30.0: ICD-10-CM

## 2023-01-19 DIAGNOSIS — Z94.4: Primary | ICD-10-CM

## 2023-01-19 LAB
ALBUMIN SERPL-MCNC: 4.5 G/DL (ref 3.8–4.9)
ALBUMIN/GLOB SERPL: 1.46 G/DL (ref 1.6–3.17)
ALP SERPL-CCNC: 65 U/L (ref 41–126)
ALT SERPL-CCNC: 70 U/L (ref 8–44)
ANION GAP SERPL CALC-SCNC: 16.6 MMOL/L (ref 10–18)
AST SERPL-CCNC: 71 U/L (ref 13–35)
BASOPHILS # BLD AUTO: 0.02 X 10*3/UL (ref 0–0.1)
BASOPHILS NFR BLD AUTO: 0.3 %
BUN SERPL-SCNC: 30.5 MG/DL (ref 9–27)
BUN/CREAT SERPL: 15.33 RATIO (ref 12–20)
CALCIUM SPEC-MCNC: 10 MG/DL (ref 8.7–10.3)
CHLORIDE SERPL-SCNC: 95 MMOL/L (ref 96–109)
CO2 SERPL-SCNC: 20.6 MMOL/L (ref 20–27.5)
EOSINOPHIL # BLD AUTO: 0.09 X 10*3/UL (ref 0.04–0.35)
EOSINOPHIL NFR BLD AUTO: 1.4 %
ERYTHROCYTE [DISTWIDTH] IN BLOOD BY AUTOMATED COUNT: 4.62 X 10*6/UL (ref 4.1–5.2)
ERYTHROCYTE [DISTWIDTH] IN BLOOD: 16.3 % (ref 11.5–14.5)
GLOBULIN SER CALC-MCNC: 3.1 G/DL (ref 1.6–3.3)
GLUCOSE SERPL-MCNC: 181 MG/DL (ref 70–110)
HCT VFR BLD AUTO: 38.2 % (ref 37.2–46.3)
HGB BLD-MCNC: 12 G/DL (ref 12–15)
IMM GRANULOCYTES BLD QL AUTO: 0.3 %
LYMPHOCYTES # SPEC AUTO: 2.78 X 10*3/UL (ref 0.9–5)
LYMPHOCYTES NFR SPEC AUTO: 41.7 %
MCH RBC QN AUTO: 26 PG (ref 27–32)
MCHC RBC AUTO-ENTMCNC: 31.4 G/DL (ref 32–37)
MCV RBC AUTO: 82.7 FL (ref 80–97)
MONOCYTES # BLD AUTO: 0.63 X 10*3/UL (ref 0.2–1)
MONOCYTES NFR BLD AUTO: 9.5 %
NEUTROPHILS # BLD AUTO: 3.12 X 10*3/UL (ref 1.8–7.7)
NEUTROPHILS NFR BLD AUTO: 46.8 %
NRBC BLD AUTO-RTO: 0 /100 WBCS (ref 0–0)
PLATELET # BLD AUTO: 283 X 10*3/UL (ref 140–440)
POTASSIUM SERPL-SCNC: 4.8 MMOL/L (ref 3.5–5.5)
PROT SERPL-MCNC: 7.6 G/DL (ref 6.2–8.2)
SODIUM SERPL-SCNC: 132 MMOL/L (ref 135–145)
WBC # BLD AUTO: 6.66 X 10*3/UL (ref 4.5–10)

## 2023-01-19 PROCEDURE — 85025 COMPLETE CBC W/AUTO DIFF WBC: CPT

## 2023-01-19 PROCEDURE — 87086 URINE CULTURE/COLONY COUNT: CPT

## 2023-01-19 PROCEDURE — 83036 HEMOGLOBIN GLYCOSYLATED A1C: CPT

## 2023-01-19 PROCEDURE — 80158 DRUG ASSAY CYCLOSPORINE: CPT

## 2023-01-19 PROCEDURE — 36415 COLL VENOUS BLD VENIPUNCTURE: CPT

## 2023-01-19 PROCEDURE — 80053 COMPREHEN METABOLIC PANEL: CPT

## 2023-07-13 ENCOUNTER — HOSPITAL ENCOUNTER (OUTPATIENT)
Dept: HOSPITAL 47 - RADUSWWP | Age: 78
Discharge: HOME | End: 2023-07-13
Attending: UROLOGY
Payer: MEDICARE

## 2023-07-13 DIAGNOSIS — N39.9: Primary | ICD-10-CM

## 2023-07-13 PROCEDURE — 76770 US EXAM ABDO BACK WALL COMP: CPT

## 2023-07-13 NOTE — US
EXAMINATION TYPE: US kidneys/renal and bladder

 

DATE OF EXAM: 7/13/2023

 

COMPARISON: NONE

 

CLINICAL INDICATION: Female, 77 years old with history of N39.9DISORDER OF URINARY SYSTEM, UNSPECIFIE
D;

 

EXAM MEASUREMENTS:

 

Right Kidney:  10.6 x 4.1 x 4.6 cm

Left Kidney: 10.4 x 4.9 x 4.5 cm

 

 

 

Right Kidney: No hydronephrosis or masses seen  

Left Kidney: No hydronephrosis or masses seen  

Bladder: wnl

**Bilateral Jets seen: Yes

 

 

There is no evidence for hydronephrosis at this point in time.  No nephrolithiasis is seen.  No nat
s are identified.  The urinary bladder is anechoic.  Bilateral ureteral jets are seen.

 

 

 

IMPRESSION:

Negative study

## 2023-07-18 ENCOUNTER — HOSPITAL ENCOUNTER (OUTPATIENT)
Dept: HOSPITAL 47 - LABWHC1 | Age: 78
Discharge: HOME | End: 2023-07-18
Attending: INTERNAL MEDICINE
Payer: MEDICARE

## 2023-07-18 DIAGNOSIS — Z94.4: Primary | ICD-10-CM

## 2023-07-18 PROCEDURE — 80158 DRUG ASSAY CYCLOSPORINE: CPT

## 2023-07-18 PROCEDURE — 36415 COLL VENOUS BLD VENIPUNCTURE: CPT

## 2023-08-03 ENCOUNTER — HOSPITAL ENCOUNTER (OUTPATIENT)
Dept: HOSPITAL 47 - LABWHC1 | Age: 78
Discharge: HOME | End: 2023-08-03
Attending: INTERNAL MEDICINE
Payer: MEDICARE

## 2023-08-03 DIAGNOSIS — E83.9: Primary | ICD-10-CM

## 2023-08-03 DIAGNOSIS — M89.9: ICD-10-CM

## 2023-08-03 DIAGNOSIS — N18.32: ICD-10-CM

## 2023-08-03 LAB
ALBUMIN SERPL-MCNC: 5 D/DL (ref 3.8–4.9)
ANION GAP SERPL CALC-SCNC: 13.8 MMOL/L (ref 4–12)
BASOPHILS # BLD AUTO: 0.03 X 10*3/UL (ref 0–0.1)
BASOPHILS NFR BLD AUTO: 0.5 %
BUN SERPL-SCNC: 25 MG/DL (ref 9–27)
BUN/CREAT SERPL: 17.86 RATIO (ref 12–20)
CALCIUM SPEC-MCNC: 9.9 MG/DL (ref 8.7–10.3)
CHLORIDE SERPL-SCNC: 98 MMOL/L (ref 96–109)
CO2 SERPL-SCNC: 22.2 MMOL/L (ref 21.6–31.8)
EOSINOPHIL # BLD AUTO: 0.1 X 10*3/UL (ref 0.04–0.35)
EOSINOPHIL NFR BLD AUTO: 1.7 %
ERYTHROCYTE [DISTWIDTH] IN BLOOD BY AUTOMATED COUNT: 4.79 X 10*6/UL (ref 4.1–5.2)
ERYTHROCYTE [DISTWIDTH] IN BLOOD: 14.2 % (ref 11.5–14.5)
GLUCOSE SERPL-MCNC: 70 MG/DL (ref 70–110)
HCT VFR BLD AUTO: 39.3 % (ref 37.2–46.3)
HGB BLD-MCNC: 12.7 D/DL (ref 12–15)
IMM GRANULOCYTES BLD QL AUTO: 1 %
LYMPHOCYTES # SPEC AUTO: 1.95 X 10*3/UL (ref 0.9–5)
LYMPHOCYTES NFR SPEC AUTO: 34 %
MCH RBC QN AUTO: 26.5 PG (ref 27–32)
MCHC RBC AUTO-ENTMCNC: 32.3 D/DL (ref 32–37)
MCV RBC AUTO: 82 FL (ref 80–97)
MONOCYTES # BLD AUTO: 0.43 X 10*3/UL (ref 0.2–1)
MONOCYTES NFR BLD AUTO: 7.5 %
NEUTROPHILS # BLD AUTO: 3.16 X 10*3/UL (ref 1.8–7.7)
NEUTROPHILS NFR BLD AUTO: 55.3 %
NRBC BLD AUTO-RTO: 0 X 10*3/UL (ref 0–0.01)
PLATELET # BLD AUTO: 376 X 10*3/UL (ref 140–440)
POTASSIUM SERPL-SCNC: 5.2 MMOL/L (ref 3.5–5.5)
PROT/CREAT UR-RTO: 0.19
SODIUM SERPL-SCNC: 134 MMOL/L (ref 135–145)
WBC # BLD AUTO: 5.73 X 10*3/UL (ref 4.5–10)

## 2023-08-03 PROCEDURE — 36415 COLL VENOUS BLD VENIPUNCTURE: CPT

## 2023-08-03 PROCEDURE — 80069 RENAL FUNCTION PANEL: CPT

## 2023-08-03 PROCEDURE — 82570 ASSAY OF URINE CREATININE: CPT

## 2023-08-03 PROCEDURE — 84156 ASSAY OF PROTEIN URINE: CPT

## 2023-08-03 PROCEDURE — 83970 ASSAY OF PARATHORMONE: CPT

## 2023-08-03 PROCEDURE — 85025 COMPLETE CBC W/AUTO DIFF WBC: CPT

## 2023-08-22 ENCOUNTER — HOSPITAL ENCOUNTER (OUTPATIENT)
Dept: HOSPITAL 47 - LABWHC1 | Age: 78
Discharge: HOME | End: 2023-08-22
Attending: INTERNAL MEDICINE
Payer: MEDICARE

## 2023-08-22 DIAGNOSIS — Z94.4: Primary | ICD-10-CM

## 2023-08-22 PROCEDURE — 80158 DRUG ASSAY CYCLOSPORINE: CPT

## 2023-08-22 PROCEDURE — 36415 COLL VENOUS BLD VENIPUNCTURE: CPT

## 2023-09-12 ENCOUNTER — HOSPITAL ENCOUNTER (OUTPATIENT)
Dept: HOSPITAL 47 - RADUSWWP | Age: 78
Discharge: HOME | End: 2023-09-12
Attending: STUDENT IN AN ORGANIZED HEALTH CARE EDUCATION/TRAINING PROGRAM
Payer: MEDICARE

## 2023-09-12 DIAGNOSIS — Z94.4: Primary | ICD-10-CM

## 2023-09-12 DIAGNOSIS — Z90.49: ICD-10-CM

## 2023-09-12 PROCEDURE — 76705 ECHO EXAM OF ABDOMEN: CPT

## 2023-09-12 NOTE — US
EXAMINATION TYPE: US abdomen limited

 

DATE OF EXAM: 9/12/2023

 

COMPARISON: None

 

CLINICAL INDICATION: Female, 77 years old with history of Z94.4 LIVER TRANSPLANT STATUS; Pt states li
carol transplant 12-14 years ago/ pt states "nodule" visualized on prior US at outside facility in [facility]
fornia/ GB surgically absent 

 

TECHNIQUE: Multiple sonographic images of the right upper quadrant are obtained.

 

FINDINGS:

 

EXAM MEASUREMENTS:

 

Liver Length:  16.3 cm   

CBD:  0.5 cm

Right Kidney:  10.2 x 3.8 x 4.2 cm

 

 

Pancreas:  Obscured by bowel gas

Liver:  Heterogeneous and echogenic especially left lobe   

Gallbladder:  Surgically absent

**Evidence for sonographic Tinsley's sign:  No

CBD:  wnl 

Right Kidney:  wnl 

 

 

 

IMPRESSION: 

 

1. Echogenic liver parenchyma suggesting underlying fatty infiltration. No focal lesion is identified
.

2. Status post cholecystectomy. No biliary ductal dilatation.

## 2023-10-30 ENCOUNTER — HOSPITAL ENCOUNTER (OUTPATIENT)
Dept: HOSPITAL 47 - LABWHC1 | Age: 78
Discharge: HOME | End: 2023-10-30
Attending: INTERNAL MEDICINE
Payer: MEDICARE

## 2023-10-30 DIAGNOSIS — E03.9: Primary | ICD-10-CM

## 2023-10-30 DIAGNOSIS — Z94.4: ICD-10-CM

## 2023-10-30 DIAGNOSIS — E11.65: ICD-10-CM

## 2023-10-30 LAB
ALBUMIN SERPL-MCNC: 4.5 D/DL (ref 3.8–4.9)
ALBUMIN/GLOB SERPL: 1.61 RATIO (ref 1.6–3.17)
ALP SERPL-CCNC: 37 U/L (ref 41–126)
ALT SERPL-CCNC: 41 U/L (ref 8–44)
ANION GAP SERPL CALC-SCNC: 13.2 MMOL/L (ref 4–12)
AST SERPL-CCNC: 46 U/L (ref 13–35)
BUN SERPL-SCNC: 21.1 MG/DL (ref 9–27)
BUN/CREAT SERPL: 16.23 RATIO (ref 12–20)
CALCIUM SPEC-MCNC: 9.3 MG/DL (ref 8.7–10.3)
CHLORIDE SERPL-SCNC: 102 MMOL/L (ref 96–109)
CHOLEST SERPL-MCNC: 154 MG/DL (ref 0–200)
CO2 SERPL-SCNC: 23.8 MMOL/L (ref 21.6–31.8)
GLOBULIN SER CALC-MCNC: 2.8 D/DL (ref 1.6–3.3)
GLUCOSE SERPL-MCNC: 149 MG/DL (ref 70–110)
HDLC SERPL-MCNC: 19.9 MG/DL (ref 40–60)
LDLC SERPL DIRECT ASSAY-MCNC: 42.6 MG/DL (ref 0–129)
POTASSIUM SERPL-SCNC: 4.6 MMOL/L (ref 3.5–5.5)
PROT SERPL-MCNC: 7.3 D/DL (ref 6.2–8.2)
SODIUM SERPL-SCNC: 139 MMOL/L (ref 135–145)
T4 FREE SERPL-MCNC: 1.32 NG/DL (ref 0.8–1.8)
TRIGL SERPL-MCNC: 674 MG/DL (ref 0–149)
VLDLC SERPL CALC-MCNC: 134.8 MG/DL (ref 5–40)

## 2023-10-30 PROCEDURE — 82570 ASSAY OF URINE CREATININE: CPT

## 2023-10-30 PROCEDURE — 83721 ASSAY OF BLOOD LIPOPROTEIN: CPT

## 2023-10-30 PROCEDURE — 82043 UR ALBUMIN QUANTITATIVE: CPT

## 2023-10-30 PROCEDURE — 84439 ASSAY OF FREE THYROXINE: CPT

## 2023-10-30 PROCEDURE — 83036 HEMOGLOBIN GLYCOSYLATED A1C: CPT

## 2023-10-30 PROCEDURE — 36415 COLL VENOUS BLD VENIPUNCTURE: CPT

## 2023-10-30 PROCEDURE — 80053 COMPREHEN METABOLIC PANEL: CPT

## 2023-10-30 PROCEDURE — 80158 DRUG ASSAY CYCLOSPORINE: CPT

## 2023-10-30 PROCEDURE — 84443 ASSAY THYROID STIM HORMONE: CPT

## 2023-10-30 PROCEDURE — 80061 LIPID PANEL: CPT

## 2023-11-06 ENCOUNTER — HOSPITAL ENCOUNTER (OUTPATIENT)
Dept: HOSPITAL 47 - RADMAMWWP | Age: 78
Discharge: HOME | End: 2023-11-06
Attending: INTERNAL MEDICINE
Payer: MEDICARE

## 2023-11-06 DIAGNOSIS — Z78.0: ICD-10-CM

## 2023-11-06 DIAGNOSIS — Z80.3: ICD-10-CM

## 2023-11-06 DIAGNOSIS — Z12.31: Primary | ICD-10-CM

## 2023-11-06 PROCEDURE — 77067 SCR MAMMO BI INCL CAD: CPT

## 2023-11-06 PROCEDURE — 77063 BREAST TOMOSYNTHESIS BI: CPT

## 2023-11-06 NOTE — MM
Reason for Exam: Screening  (asymptomatic). 

Last mammogram was performed 1 year(s) and 1 month(s) ago. 





Patient History: 

Menarche at age 13. First Full-Term Pregnancy at age 22. Left ovary removed at age 46. Right ovary

removed at age 46. Hysterectomy at age 46. Postmenopausal. Patient has history of breast feeding.

Other cancer. Estrogen, from age 46 until age 48.

Paternal grandmother had breast cancer. Paternal aunt had breast cancer. Maternal grandmother had

ovarian cancer, age 34. Mother had ovarian cancer at or over age 50. 





Risk Values: 

Love 5 year model risk: 1.6%.

NCI Lifetime model risk: 3.0%.





Prior Study Comparison: 

8/5/2019  Screening Mammogram, [place]. 9/18/2020 Bilateral Screening Mammogram, [facility]. 10/7/2021

Bilateral Screening Mammogram, [facility]. 10/11/2021 Left Diagnostic Mammogram, [facility]. 10/11/2022 Bilateral

MG 3D screening mammo w/cad, [facility]. 





Tissue Density: 

The breast tissue is heterogeneously dense. This may lower the sensitivity of mammography.





Findings: 

Analyzed By CAD. 

There is no suspicious group of microcalcifications or new suspicious mass. Benign-appearing

calcifications bilaterally. 





Overall Assessment: Benign, BI-RAD 2





Management: 

Screening Mammogram of both breasts in 1 year.

[facility] will attempt to contact patient to return for supplemental views and

ultrasound if indicated.



Patient should continue monthly self-breast exams.  A clinical breast exam by your physician is

recommended on an annual basis.

This exam should not preclude additional follow-up of suspicious palpable abnormalities.



Note on Love scores and lifetime risk:

1. A Love score greater than 3% is considered moderate risk. If this is the case, consider

specialist referral to assess eligibility for a risk reducing agent.

2. If overall lifetime risk for the development of breast cancer is 20% or higher, the patient may

qualify for future screening with alternating mammogram and breast MRI.



Electronically signed and approved by: Bernard Coats DO

## 2023-11-28 ENCOUNTER — HOSPITAL ENCOUNTER (OUTPATIENT)
Dept: HOSPITAL 47 - RADECHMAIN | Age: 78
Discharge: HOME | End: 2023-11-28
Attending: INTERNAL MEDICINE
Payer: MEDICARE

## 2023-11-28 DIAGNOSIS — I35.0: ICD-10-CM

## 2023-11-28 DIAGNOSIS — I34.0: Primary | ICD-10-CM

## 2023-11-28 PROCEDURE — 93306 TTE W/DOPPLER COMPLETE: CPT

## 2023-11-29 NOTE — CA
Transthoracic Echo Report 

 Name: Marycarmen Marroquin 

 MRN:    [id number] 

 Age:    78     Gender:     F 

 

 :    1945 

 Exam Date:     2023 14:12 

 Exam Location: [facility] 

 Ht (in):     61     Wt (lb):     140 

 Ordering Physician:        Harrison Castelan MD 

 Attending/Referring Phys: 

 Technician         Olamide Morales [facility] 

 Procedure CPT: 

 Indications:       I34.0 NONRHEUMATIC MITRAL (VALVE) INSUFFICIENCY 

 

 Cardiac Hx: 

 Technical Quality:      Technically difficult study 

 Contrast 1:                                Total Dose (mL): 

 Contrast 2:                                Total Dose (mL): 

 

 MEASUREMENTS  (Male / Female) Normal Values 

 2D ECHO 

 LV Diastolic Diameter PLAX        4.3 cm                4.2 - 5.9 / 3.9 - 5.3 cm 

 LV Systolic Diameter PLAX         2.8 cm                 

 IVS Diastolic Thickness           1.0 cm                0.6 - 1.0 / 0.6 - 0.9 cm 

 LVPW Diastolic Thickness          1.0 cm                0.6 - 1.0 / 0.6 - 0.9 cm 

 LV Relative Wall Thickness        0.5                    

 LVOT Diameter                     2.0 cm                 

 Ascending Aorta Diameter          3.9 cm                 

 

 M-MODE 

 Aortic Root Diameter MM           2.6 cm                 

 LA Systolic Diameter MM           3.9 cm                 

 LA Ao Ratio MM                    1.5                    

 AV Cusp Separation MM             1.0 cm                 

 

 DOPPLER 

 AV Peak Velocity                  252.1 cm/s             

 AV Peak Gradient                  25.4 mmHg              

 AV Mean Velocity                  192.8 cm/s             

 AV Mean Gradient                  16.3 mmHg              

 AV Velocity Time Integral         60.5 cm                

 AI Peak Velocity                  371.4 cm/s             

 AI Peak Gradient                  55.2 mmHg              

 AI Pressure Half Time             512.5 ms               

 LVOT Peak Velocity                80.8 cm/s              

 LVOT Peak Gradient                2.6 mmHg               

 LVOT Velocity Time Integral       21.2 cm                

 LVOT Stroke Volume                64.5 cm???               

 LVOT Stroke Volume Index          39.8 ml/m???             

 LVOT Cardiac Index                2628.3 cm???/min???m???      

 AV Area Cont Eq vti               1.1 cm???                

 AV Area Cont Eq pk                1.0 cm???                

 Mitral E Point Velocity           58.9 cm/s              

 Mitral A Point Velocity           77.8 cm/s              

 Mitral E to A Ratio               0.8                    

 MV Deceleration Time              301.3 ms               

 LV E' Lateral Velocity            5.6 cm/s               

 Mitral E to LV E' Lateral Ratio   10.5                   

 LV E' Septal Velocity             5.8 cm/s               

 Mitral E to LV E' Septal Ratio    10.2                   

 TR Peak Velocity                  222.2 cm/s             

 TR Peak Gradient                  19.8 mmHg              

 Right Atrial Pressure             8.0 mmHg               

 Pulmonary Artery Systolic Pressu  27.8 mmHg              

 Right Ventricular Systolic Press  27.8 mmHg              

 

 

 FINDINGS 

 Left Ventricle 

 Mildly increased left ventricular wall thickness. Left ventricular cavity size  

 normal. Low normal left ventricular systolic function with no obvious regional  

 wall motion abnormalities. Left ventricular ejection fraction is estimated at  

 50-55%. 

 

 Right Ventricle 

 Normal right ventricular size. 

 

 Right Atrium 

 Normal right atrial size. 

 

 Left Atrium 

 Normal left atrial size. 

 

 Mitral Valve 

 Structurally normal mitral valve. Mitral annular calcification. Mild mitral  

 regurgitation. 

 

 Aortic Valve 

 Trileaflet aortic valve. Diffuse thickening of the aortic valve cusps with  

 reduced excursion. Mild aortic stenosis with a peak gradient of 25.43 mmHg and  

 a mean gradient of 16.27 mmHg. Mild aortic regurgitation. 

 

 Tricuspid Valve 

 Structurally normal tricuspid valve. Trace tricuspid regurgitation. 

 

 Pulmonic Valve 

 Structurally normal pulmonic valve. Trace pulmonic regurgitation. 

 

 Pericardium 

 No pericardial effusion. 

 

 Aorta 

 Normal size aortic root and mildly dilated proximal ascending aorta. 

 

 CONCLUSIONS 

 Normal LV systolic function 

 Mild aortic stenosis 

 Mild mitral and aortic regurgitation 

 Previewed by:  

 Dr. Jayson Pelletier MD 

 (Electronically Signed) 

 Final Date:      2023 07:29

## 2024-07-27 ENCOUNTER — HOSPITAL ENCOUNTER (INPATIENT)
Dept: HOSPITAL 47 - EC | Age: 79
LOS: 3 days | Discharge: HOME | DRG: 690 | End: 2024-07-30
Attending: INTERNAL MEDICINE | Admitting: INTERNAL MEDICINE
Payer: MEDICARE

## 2024-07-27 DIAGNOSIS — Z91.041: ICD-10-CM

## 2024-07-27 DIAGNOSIS — I49.9: ICD-10-CM

## 2024-07-27 DIAGNOSIS — Z79.890: ICD-10-CM

## 2024-07-27 DIAGNOSIS — Z87.440: ICD-10-CM

## 2024-07-27 DIAGNOSIS — Z88.5: ICD-10-CM

## 2024-07-27 DIAGNOSIS — Z79.02: ICD-10-CM

## 2024-07-27 DIAGNOSIS — Z79.84: ICD-10-CM

## 2024-07-27 DIAGNOSIS — B96.5: ICD-10-CM

## 2024-07-27 DIAGNOSIS — E78.5: ICD-10-CM

## 2024-07-27 DIAGNOSIS — Z86.73: ICD-10-CM

## 2024-07-27 DIAGNOSIS — Z79.4: ICD-10-CM

## 2024-07-27 DIAGNOSIS — I12.9: ICD-10-CM

## 2024-07-27 DIAGNOSIS — E87.1: ICD-10-CM

## 2024-07-27 DIAGNOSIS — Z79.82: ICD-10-CM

## 2024-07-27 DIAGNOSIS — N17.9: ICD-10-CM

## 2024-07-27 DIAGNOSIS — Z88.8: ICD-10-CM

## 2024-07-27 DIAGNOSIS — N39.0: Primary | ICD-10-CM

## 2024-07-27 DIAGNOSIS — Z79.60: ICD-10-CM

## 2024-07-27 DIAGNOSIS — E03.9: ICD-10-CM

## 2024-07-27 DIAGNOSIS — E11.22: ICD-10-CM

## 2024-07-27 DIAGNOSIS — K75.81: ICD-10-CM

## 2024-07-27 DIAGNOSIS — Z79.899: ICD-10-CM

## 2024-07-27 DIAGNOSIS — E86.0: ICD-10-CM

## 2024-07-27 DIAGNOSIS — K21.9: ICD-10-CM

## 2024-07-27 DIAGNOSIS — Z94.4: ICD-10-CM

## 2024-07-27 DIAGNOSIS — N18.31: ICD-10-CM

## 2024-07-27 DIAGNOSIS — R00.0: ICD-10-CM

## 2024-07-27 DIAGNOSIS — Z66: ICD-10-CM

## 2024-07-27 LAB
ALBUMIN SERPL-MCNC: 4.3 G/DL (ref 3.5–5)
ALP SERPL-CCNC: 33 U/L (ref 38–126)
ALT SERPL-CCNC: 19 U/L (ref 4–34)
ANION GAP SERPL CALC-SCNC: 7 MMOL/L
AST SERPL-CCNC: 32 U/L (ref 14–36)
BASOPHILS # BLD AUTO: 0 K/UL (ref 0–0.2)
BASOPHILS NFR BLD AUTO: 0 %
BUN SERPL-SCNC: 31 MG/DL (ref 7–17)
CALCIUM SPEC-MCNC: 9.5 MG/DL (ref 8.4–10.2)
CHLORIDE SERPL-SCNC: 100 MMOL/L (ref 98–107)
CO2 SERPL-SCNC: 27 MMOL/L (ref 22–30)
EOSINOPHIL # BLD AUTO: 0.1 K/UL (ref 0–0.7)
EOSINOPHIL NFR BLD AUTO: 1 %
ERYTHROCYTE [DISTWIDTH] IN BLOOD BY AUTOMATED COUNT: 4.11 M/UL (ref 3.8–5.4)
ERYTHROCYTE [DISTWIDTH] IN BLOOD: 14.7 % (ref 11.5–15.5)
GLUCOSE BLD-MCNC: 128 MG/DL (ref 70–110)
GLUCOSE BLD-MCNC: 99 MG/DL (ref 70–110)
GLUCOSE SERPL-MCNC: 123 MG/DL (ref 74–99)
HCT VFR BLD AUTO: 32.5 % (ref 34–46)
HGB BLD-MCNC: 11 GM/DL (ref 11.4–16)
LYMPHOCYTES # SPEC AUTO: 1.9 K/UL (ref 1–4.8)
LYMPHOCYTES NFR SPEC AUTO: 19 %
MCH RBC QN AUTO: 26.8 PG (ref 25–35)
MCHC RBC AUTO-ENTMCNC: 34 G/DL (ref 31–37)
MCV RBC AUTO: 78.9 FL (ref 80–100)
MONOCYTES # BLD AUTO: 0.5 K/UL (ref 0–1)
MONOCYTES NFR BLD AUTO: 5 %
NEUTROPHILS # BLD AUTO: 7.3 K/UL (ref 1.3–7.7)
NEUTROPHILS NFR BLD AUTO: 73 %
PH UR: 7 [PH] (ref 5–8)
PLATELET # BLD AUTO: 316 K/UL (ref 150–450)
POTASSIUM SERPL-SCNC: 4.9 MMOL/L (ref 3.5–5.1)
PROT SERPL-MCNC: 7.3 G/DL (ref 6.3–8.2)
RBC UR QL: 37 /HPF (ref 0–5)
SODIUM SERPL-SCNC: 134 MMOL/L (ref 137–145)
SP GR UR: 1.01 (ref 1–1.03)
UROBILINOGEN UR QL STRIP: <2 MG/DL (ref ?–2)
WBC # BLD AUTO: 10 K/UL (ref 3.8–10.6)
WBC #/AREA URNS HPF: >182 /HPF (ref 0–5)

## 2024-07-27 PROCEDURE — 84145 PROCALCITONIN (PCT): CPT

## 2024-07-27 PROCEDURE — 80158 DRUG ASSAY CYCLOSPORINE: CPT

## 2024-07-27 PROCEDURE — 83605 ASSAY OF LACTIC ACID: CPT

## 2024-07-27 PROCEDURE — 36415 COLL VENOUS BLD VENIPUNCTURE: CPT

## 2024-07-27 PROCEDURE — 80053 COMPREHEN METABOLIC PANEL: CPT

## 2024-07-27 PROCEDURE — 99285 EMERGENCY DEPT VISIT HI MDM: CPT

## 2024-07-27 PROCEDURE — 96374 THER/PROPH/DIAG INJ IV PUSH: CPT

## 2024-07-27 PROCEDURE — 81001 URINALYSIS AUTO W/SCOPE: CPT

## 2024-07-27 PROCEDURE — 82248 BILIRUBIN DIRECT: CPT

## 2024-07-27 PROCEDURE — 80048 BASIC METABOLIC PNL TOTAL CA: CPT

## 2024-07-27 PROCEDURE — 87086 URINE CULTURE/COLONY COUNT: CPT

## 2024-07-27 PROCEDURE — 85025 COMPLETE CBC W/AUTO DIFF WBC: CPT

## 2024-07-27 PROCEDURE — 85610 PROTHROMBIN TIME: CPT

## 2024-07-27 PROCEDURE — 96361 HYDRATE IV INFUSION ADD-ON: CPT

## 2024-07-27 PROCEDURE — 85027 COMPLETE CBC AUTOMATED: CPT

## 2024-07-27 RX ADMIN — INSULIN HUMAN SCH UNIT: 100 INJECTION, SOLUTION PARENTERAL at 17:57

## 2024-07-27 RX ADMIN — METOPROLOL TARTRATE SCH MG: 50 TABLET, FILM COATED ORAL at 21:53

## 2024-07-27 RX ADMIN — MYCOPHENOLIC ACID SCH MG: 180 TABLET, DELAYED RELEASE ORAL at 21:53

## 2024-07-27 RX ADMIN — ATORVASTATIN CALCIUM SCH MG: 40 TABLET, FILM COATED ORAL at 21:53

## 2024-07-27 RX ADMIN — NICARDIPINE HYDROCHLORIDE STA MLS/HR: 2.5 INJECTION INTRAVENOUS at 12:51

## 2024-07-27 RX ADMIN — PIPERACILLIN AND TAZOBACTAM SCH MLS/HR: 3; .375 INJECTION, POWDER, FOR SOLUTION INTRAVENOUS at 16:02

## 2024-07-27 NOTE — ED
Female Urogenital HPI





- General


Chief complaint: Urogenital


Stated complaint: Abn Labs/UTI


Time Seen by Provider: 07/27/24 11:20


Source: patient, RN notes reviewed


Mode of arrival: ambulatory


Limitations: no limitations





- History of Present Illness


Initial comments: 


This is a 78-year-old female with a past medical history of liver transplant, on

cyclosporin, who presents to the emergency department chief complaint of urinary

tract infection.  Patient states that she was evaluated at urgent care on 

Wednesday where she was diagnosed with a urinary tract infection and sent home 

with cefuroxime pending urinary culture.  Patient contacted urgent care this 

morning where they instructed her her urine culture was positive for Pseudomonas

and to report to the emergency department due to worsening urinary tract 

infection symptoms.  She states that she has been using the bathroom more 

frequently, increase in urgency, dysuria.  She denies hematuria.  Patient denies

symptoms of fevers, chills, nausea, vomiting, diarrhea.  Denies recent 

antibiotic use.








- Related Data


                                Home Medications











 Medication  Instructions  Recorded  Confirmed


 


Acetaminophen Tab [Tylenol] 1,000 mg PO BID PRN 11/11/20 06/07/22


 


Calcium Carbonate/Vitamin D3 1 tab PO BID 11/11/20 06/07/22





[Calcium 500-Vit D3 5 Mcg (200 Iu)]   


 


Clopidogrel [Plavix] 75 mg PO DAILY 11/11/20 06/07/22


 


Glucosamine/Chondr Coffey A Sod [Osteo 2 tab PO DAILY 11/11/20 06/07/22





Bi-Flex Caplet]   


 


Insulin Glargine [Lantus Vial] 40 unit SQ DAILY 11/11/20 06/07/22


 


Levothyroxine Sodium [Synthroid] 50 mcg PO DAILY 11/11/20 06/07/22


 


Magnesium Oxide 400 mg PO DAILY 11/11/20 06/07/22


 


Metoprolol Tartrate [Lopressor] 50 mg PO BID 11/11/20 06/07/22


 


Mycophenolate Sodium [Mycophenolic 360 mg PO BID 11/11/20 06/07/22





Acid]   


 


Pantoprazole Sodium [Protonix] 40 mg PO AC-BRKFST 11/11/20 06/07/22


 


Vit C/E/Zn/Coppr/Lutein/Zeaxan 1 cap PO DAILY 11/11/20 06/07/22





[Preservision Areds 2 Softgel]   


 


lisinopriL [Prinivil] 10 mg PO DAILY 11/11/20 06/07/22


 


sitaGLIPtin [Januvia] 100 mg PO DAILY 11/11/20 06/07/22


 


Insulin Regular, Human [NovoLIN R See Protocol SQ AC-TID 11/17/21 06/07/22





Flexpen]   


 


Meclizine [Antivert] 25 mg PO BID 11/17/21 06/07/22


 


Atorvastatin [Lipitor] 40 mg PO HS 06/07/22 06/07/22


 


Furosemide [Lasix] 20 mg PO DAILY PRN 06/07/22 06/07/22


 


Multivitamins, Thera [Multivitamin 1 tab PO DAILY 06/07/22 06/07/22





(formulary)]   


 


Zinc Gluconate [Zinc] 50 mg PO DAILY 06/07/22 06/07/22


 


Cyclosporine, Modified [Gengraf] 25 mg PO DAILY 06/08/22 06/08/22


 


cycloSPORINE, MODIFIED [Neoral] 50 mg PO HS 06/08/22 06/08/22








                                  Previous Rx's











 Medication  Instructions  Recorded


 


Aspirin 81 mg PO DAILY 30 Days #30 tab 06/09/22


 


Fenofibrate [Lofibra] 160 mg PO DAILY 30 Days #30 tab 06/09/22











                                    Allergies











Allergy/AdvReac Type Severity Reaction Status Date / Time


 


Iodinated Contrast Media Allergy  Rash/Hives Verified 11/17/21 12:08


 


pioglitazone [From Actos] Allergy  Rash/Hives Verified 11/17/21 12:08


 


pentazocine [From Talwin] AdvReac  Vomiting Verified 11/17/21 12:08














Review of Systems


ROS Statement: 


Those systems with pertinent positive or pertinent negative responses have been 

documented in the HPI.





ROS Other: All systems not noted in ROS Statement are negative.





Past Medical History


Past Medical History: CVA/TIA, Diabetes Mellitus, Hypertension, Thyroid Disorder


History of Any Multi-Drug Resistant Organisms: None Reported


Past Surgical History: Appendectomy, Cholecystectomy, Hysterectomy


Additional Past Surgical History / Comment(s): D&C, liver transplant


Past Anesthesia/Blood Transfusion Reactions: No Reported Reaction


Past Psychological History: No Psychological Hx Reported


Smoking Status: Never smoker


Past Alcohol Use History: None Reported


Past Drug Use History: None Reported





- Past Family History


  ** Mother


Family Medical History: Liver Disease





General Exam


Limitations: no limitations


General appearance: alert, in no apparent distress


Head exam: Present: atraumatic, normocephalic, normal inspection


Eye exam: Present: normal appearance, PERRL, EOMI.  Absent: scleral icterus, 

conjunctival injection, periorbital swelling


ENT exam: Present: normal exam, mucous membranes moist


Neck exam: Present: normal inspection.  Absent: tenderness, meningismus, 

lymphadenopathy


Respiratory exam: Present: normal lung sounds bilaterally.  Absent: respiratory 

distress, wheezes, rales, rhonchi, stridor


Cardiovascular Exam: Present: regular rate, normal rhythm, normal heart sounds. 

Absent: systolic murmur, diastolic murmur, rubs, gallop, clicks


GI/Abdominal exam: Present: soft, normal bowel sounds.  Absent: distended, 

tenderness, guarding, rebound, rigid


Extremities exam: Present: normal inspection, full ROM, normal capillary refill.

 Absent: tenderness, pedal edema, joint swelling, calf tenderness


Back exam: Present: normal inspection


Neurological exam: Present: alert, oriented X3, CN II-XII intact


Psychiatric exam: Present: normal affect, normal mood


Skin exam: Present: warm, dry, intact, normal color.  Absent: rash





Course


                                   Vital Signs











  07/27/24





  11:09


 


Temperature 98.4 F


 


Pulse Rate 80


 


Respiratory 16





Rate 


 


Blood Pressure 159/71


 


O2 Sat by Pulse 98





Oximetry 














Medical Decision Making





- Medical Decision Making


Was pt. sent in by a medical professional or institution (MADELIN Roberson, NP, urgent 

care, hospital, or nursing home...) When possible be specific


@ -Was advised by urgent care to report to the emergency department due to 

positive urine culture of Pseudomonas and urinary symptoms persisting after 

antibiotic treatment.


Did you speak to anyone other than the patient for history (EMS, parent, family,

police, friend...)? What history was obtained from this source 


@ -No


Did you review nursing and triage notes (agree or disagree)? Why? 


@ -I reviewed and agree with nursing and triage notes


Were old charts reviewed (outside hosp., previous admission, EMS record, old 

EKG, old radiological studies, urgent care reports/EKG's, nursing home records)?

Report findings 


@ -No old charts were reviewed


Differential Diagnosis (chest pain, altered mental status, abdominal pain women,

abdominal pain men, vaginal bleeding, weakness, fever, dyspnea, syncope, 

headache, dizziness, GI bleed, back pain, seizure, CVA, palpatations, mental 

health, musculoskeletal)? 


@ -Differential Abdominal Pain Women:


Appendicitis, Cholecystitis, diverticulosis, ischemic bowel, pancreatitis, 

hepatitis, UTI, gastroenteritis, AAA, incarcerated hernia, bowel obstruction, 

constipation, inflammatory bowel, hepatitis, peptic ulcer disease, splenic 

infarction, perforated viscus, vulvitis, ovarian torsion, PID, kidney stone, 

placenta abruption, this is not meant to be an all-inclusive list


EKG interpreted by me (3pts min.).


@ -none


X-rays interpreted by me (1pt min.).


@ -None done


CT interpreted by me (1pt min.).


@ -None done


U/S interpreted by me (1pt. min.).


@ -None done


What testing was considered but not performed or refused? (CT, X-rays, U/S, 

labs)? Why?


@ -None


What meds were considered but not given or refused? Why?


@ -None


Did you discuss the management of the patient with other professionals 

(professionals i.e. , PA, NP, lab, RT, psych nurse, , , 

teacher, , )? Give summary


@ -[facility] regard to admission for the

patient due to the positive urine culture of Pseudomonas and urinary tract 

infection with history of liver transplant on cyclosporine.  Patient is excepted

for admission will start on IV pip-tazo pending urine culture here.


Was smoking cessation discussed for >3mins.?


@ -No


Was critical care preformed (if so, how long)?


@ -No


Were there social determinants of health that impacted care today? How? 

(Homelessness, low income, unemployed, alcoholism, drug addiction, 

transportation, low edu. Level, literacy, decrease access to med. care, FDC, 

rehab)?


@ -No


Was there de-escalation of care discussed even if they declined (Discuss DNR or 

withdrawal of care, Hospice)? DNR status


@ -No


What co-morbidities impacted this encounter? (DM, HTN, Smoking, COPD, CAD, 

Cancer, CVA, ARF, Chemo, Hep., AIDS, mental health diagnosis, sleep apnea, 

morbid obesity)?


@ -None


Was patient admitted / discharged? Hospital course, mention meds given and 

route, prescriptions, significant lab abnormalities, going to OR and other 

pertinent info.


@ -78-year-old female with concern for urinary tract infection.  In addition 

there is no abdominal pain, patient is resting comfortably and vitals are within

normal limits.  she has clinical signs of dehydration and is provided with a 

liter fluid bolus pending labs and urinalysis.  Patient did agree with this 

plan. CBC unremarkable, CMP reveals signs of mild dehydration with a hy

ponatremia of 134, elevated BUN of 31 and creatinine 1.47.  As compared to 

previous BUN/creatinine mildly elevated however no signs concerning for acute 

kidney injury.  Lactic acid nonelevated at 1.3. UA remarkable for infection 

including large leukocyte esterase, red blood cells, white blood cells and white

blood cell clumps.  Patient will be started on pip-tazo with susceptibility to 

Pseudomonas which was grown in culture at urgent care.  Patient is excepted to 

internal medicine for further evaluation.  Case discussed with Dr. Vigil.


Undiagnosed new problem with uncertain prognosis?


@ -No


Drug Therapy requiring intensive monitoring for toxicity (Heparin, Nitro, 

Insulin, Cardizem)?


@ -No


Were any procedures done?


@ -No


Diagnosis/symptom?


@ -Urine tract infection, Pseudomonas colonization


Acute, or Chronic, or Acute on Chronic?


@ -Acute


Uncomplicated (without systemic symptoms) or Complicated (systemic symptoms)?


@ -uncomplicated


Side effects of treatment?


@ -No


Exacerbation, Progression, or Severe Exacerbation?


@ -No


Poses a threat to life or bodily function? How? (Chest pain, USA, MI, pneumonia,

PE, COPD, DKA, ARF, appy, cholecystitis, CVA, Diverticulitis, Homicidal, 

Suicidal, threat to staff... and all critical care pts)


@ -No








- Lab Data


Result diagrams: 


                                 07/27/24 12:21





                                 07/27/24 12:21


                                   Lab Results











  07/27/24 07/27/24 07/27/24 Range/Units





  12:21 12:21 12:21 


 


WBC  10.0    (3.8-10.6)  k/uL


 


RBC  4.11    (3.80-5.40)  m/uL


 


Hgb  11.0 L    (11.4-16.0)  gm/dL


 


Hct  32.5 L    (34.0-46.0)  %


 


MCV  78.9 L    (80.0-100.0)  fL


 


MCH  26.8    (25.0-35.0)  pg


 


MCHC  34.0    (31.0-37.0)  g/dL


 


RDW  14.7    (11.5-15.5)  %


 


Plt Count  316    (150-450)  k/uL


 


MPV  7.4    


 


Neutrophils %  73    %


 


Lymphocytes %  19    %


 


Monocytes %  5    %


 


Eosinophils %  1    %


 


Basophils %  0    %


 


Neutrophils #  7.3    (1.3-7.7)  k/uL


 


Lymphocytes #  1.9    (1.0-4.8)  k/uL


 


Monocytes #  0.5    (0-1.0)  k/uL


 


Eosinophils #  0.1    (0-0.7)  k/uL


 


Basophils #  0.0    (0-0.2)  k/uL


 


Sodium   134 L   (137-145)  mmol/L


 


Potassium   4.9   (3.5-5.1)  mmol/L


 


Chloride   100   ()  mmol/L


 


Carbon Dioxide   27   (22-30)  mmol/L


 


Anion Gap   7   mmol/L


 


BUN   31 H   (7-17)  mg/dL


 


Creatinine   1.47 H   (0.52-1.04)  mg/dL


 


Est GFR (CKD-EPI)AfAm   39   (>60 ml/min/1.73 sqM)  


 


Est GFR (CKD-EPI)NonAf   34   (>60 ml/min/1.73 sqM)  


 


Glucose   123 H   (74-99)  mg/dL


 


Plasma Lactic Acid Aristides    1.3  (0.7-2.0)  mmol/L


 


Calcium   9.5   (8.4-10.2)  mg/dL


 


Total Bilirubin   0.6   (0.2-1.3)  mg/dL


 


AST   32   (14-36)  U/L


 


ALT   19   (4-34)  U/L


 


Alkaline Phosphatase   33 L   ()  U/L


 


Total Protein   7.3   (6.3-8.2)  g/dL


 


Albumin   4.3   (3.5-5.0)  g/dL


 


Urine Color     


 


Urine Appearance     (Clear)  


 


Urine pH     (5.0-8.0)  


 


Ur Specific Gravity     (1.001-1.035)  


 


Urine Protein     (Negative)  


 


Urine Glucose (UA)     (Negative)  


 


Urine Ketones     (Negative)  


 


Urine Blood     (Negative)  


 


Urine Nitrite     (Negative)  


 


Urine Bilirubin     (Negative)  


 


Urine Urobilinogen     (<2.0)  mg/dL


 


Ur Leukocyte Esterase     (Negative)  


 


Urine RBC     (0-5)  /hpf


 


Urine WBC     (0-5)  /hpf


 


Urine WBC Clumps     (None)  /hpf


 


Urine Bacteria     (None)  /hpf














  07/27/24 Range/Units





  12:37 


 


WBC   (3.8-10.6)  k/uL


 


RBC   (3.80-5.40)  m/uL


 


Hgb   (11.4-16.0)  gm/dL


 


Hct   (34.0-46.0)  %


 


MCV   (80.0-100.0)  fL


 


MCH   (25.0-35.0)  pg


 


MCHC   (31.0-37.0)  g/dL


 


RDW   (11.5-15.5)  %


 


Plt Count   (150-450)  k/uL


 


MPV   


 


Neutrophils %   %


 


Lymphocytes %   %


 


Monocytes %   %


 


Eosinophils %   %


 


Basophils %   %


 


Neutrophils #   (1.3-7.7)  k/uL


 


Lymphocytes #   (1.0-4.8)  k/uL


 


Monocytes #   (0-1.0)  k/uL


 


Eosinophils #   (0-0.7)  k/uL


 


Basophils #   (0-0.2)  k/uL


 


Sodium   (137-145)  mmol/L


 


Potassium   (3.5-5.1)  mmol/L


 


Chloride   ()  mmol/L


 


Carbon Dioxide   (22-30)  mmol/L


 


Anion Gap   mmol/L


 


BUN   (7-17)  mg/dL


 


Creatinine   (0.52-1.04)  mg/dL


 


Est GFR (CKD-EPI)AfAm   (>60 ml/min/1.73 sqM)  


 


Est GFR (CKD-EPI)NonAf   (>60 ml/min/1.73 sqM)  


 


Glucose   (74-99)  mg/dL


 


Plasma Lactic Acid Aristides   (0.7-2.0)  mmol/L


 


Calcium   (8.4-10.2)  mg/dL


 


Total Bilirubin   (0.2-1.3)  mg/dL


 


AST   (14-36)  U/L


 


ALT   (4-34)  U/L


 


Alkaline Phosphatase   ()  U/L


 


Total Protein   (6.3-8.2)  g/dL


 


Albumin   (3.5-5.0)  g/dL


 


Urine Color  Light Yellow  


 


Urine Appearance  Cloudy H  (Clear)  


 


Urine pH  7.0  (5.0-8.0)  


 


Ur Specific Gravity  1.015  (1.001-1.035)  


 


Urine Protein  Trace H  (Negative)  


 


Urine Glucose (UA)  Negative  (Negative)  


 


Urine Ketones  Negative  (Negative)  


 


Urine Blood  Small H  (Negative)  


 


Urine Nitrite  Negative  (Negative)  


 


Urine Bilirubin  Negative  (Negative)  


 


Urine Urobilinogen  <2.0  (<2.0)  mg/dL


 


Ur Leukocyte Esterase  Large H  (Negative)  


 


Urine RBC  37 H  (0-5)  /hpf


 


Urine WBC  >182 H  (0-5)  /hpf


 


Urine WBC Clumps  Occasional H  (None)  /hpf


 


Urine Bacteria  Rare H  (None)  /hpf














Disposition


Clinical Impression: 


 UTI (urinary tract infection), Pseudomonas aeruginosa infection





Disposition: ADMITTED AS IP TO THIS [facility]


Condition: Good


Is patient prescribed a controlled substance at d/c from ED?: No


Referrals: 


Harrison Castelan MD [Primary Care Provider] - 1-2 days


Decision to Admit Reason: Admit from EC


Decision Date: 07/27/24


Decision Time: 14:58

## 2024-07-28 LAB
ALBUMIN SERPL-MCNC: 4.5 G/DL (ref 3.5–5)
ALBUMIN/GLOB SERPL: 1.4 {RATIO}
ALP SERPL-CCNC: 35 U/L (ref 38–126)
ALT SERPL-CCNC: 20 U/L (ref 4–34)
ANION GAP SERPL CALC-SCNC: 11 MMOL/L
AST SERPL-CCNC: 34 U/L (ref 14–36)
BASOPHILS # BLD AUTO: 0 K/UL (ref 0–0.2)
BASOPHILS NFR BLD AUTO: 0 %
BILIRUB INDIRECT SERPL-MCNC: 0.4 MG/DL (ref 0–1.1)
BUN SERPL-SCNC: 30 MG/DL (ref 7–17)
CALCIUM SPEC-MCNC: 9.3 MG/DL (ref 8.4–10.2)
CHLORIDE SERPL-SCNC: 101 MMOL/L (ref 98–107)
CO2 SERPL-SCNC: 25 MMOL/L (ref 22–30)
EOSINOPHIL # BLD AUTO: 0.1 K/UL (ref 0–0.7)
EOSINOPHIL NFR BLD AUTO: 2 %
ERYTHROCYTE [DISTWIDTH] IN BLOOD BY AUTOMATED COUNT: 4.18 M/UL (ref 3.8–5.4)
ERYTHROCYTE [DISTWIDTH] IN BLOOD: 15 % (ref 11.5–15.5)
GLOBULIN SER CALC-MCNC: 3.2 G/DL
GLUCOSE BLD-MCNC: 115 MG/DL (ref 70–110)
GLUCOSE BLD-MCNC: 126 MG/DL (ref 70–110)
GLUCOSE BLD-MCNC: 128 MG/DL (ref 70–110)
GLUCOSE BLD-MCNC: 177 MG/DL (ref 70–110)
GLUCOSE SERPL-MCNC: 91 MG/DL (ref 74–99)
HCT VFR BLD AUTO: 33.3 % (ref 34–46)
HGB BLD-MCNC: 11 GM/DL (ref 11.4–16)
INR PPP: 1 (ref ?–1.2)
LYMPHOCYTES # SPEC AUTO: 2.1 K/UL (ref 1–4.8)
LYMPHOCYTES NFR SPEC AUTO: 28 %
MCH RBC QN AUTO: 26.3 PG (ref 25–35)
MCHC RBC AUTO-ENTMCNC: 33 G/DL (ref 31–37)
MCV RBC AUTO: 79.8 FL (ref 80–100)
MONOCYTES # BLD AUTO: 0.6 K/UL (ref 0–1)
MONOCYTES NFR BLD AUTO: 9 %
NEUTROPHILS # BLD AUTO: 4.2 K/UL (ref 1.3–7.7)
NEUTROPHILS NFR BLD AUTO: 58 %
PLATELET # BLD AUTO: 307 K/UL (ref 150–450)
POTASSIUM SERPL-SCNC: 4.8 MMOL/L (ref 3.5–5.1)
PROT SERPL-MCNC: 7.7 G/DL (ref 6.3–8.2)
PT BLD: 11.3 SEC (ref 10–12.5)
SODIUM SERPL-SCNC: 137 MMOL/L (ref 137–145)
WBC # BLD AUTO: 7.2 K/UL (ref 3.8–10.6)

## 2024-07-28 RX ADMIN — CEFAZOLIN SCH: 330 INJECTION, POWDER, FOR SOLUTION INTRAMUSCULAR; INTRAVENOUS at 20:09

## 2024-07-28 RX ADMIN — Medication SCH EACH: at 08:11

## 2024-07-28 RX ADMIN — LEVOTHYROXINE SODIUM SCH MCG: 50 TABLET ORAL at 06:54

## 2024-07-28 RX ADMIN — CLOPIDOGREL BISULFATE SCH MG: 75 TABLET ORAL at 08:11

## 2024-07-28 RX ADMIN — INSULIN DETEMIR SCH: 100 INJECTION, SOLUTION SUBCUTANEOUS at 13:40

## 2024-07-28 RX ADMIN — FENOFIBRATE SCH MG: 160 TABLET ORAL at 08:12

## 2024-07-28 RX ADMIN — PANTOPRAZOLE SODIUM SCH MG: 40 TABLET, DELAYED RELEASE ORAL at 06:54

## 2024-07-28 NOTE — HP
HISTORY AND PHYSICAL



CHIEF COMPLAINT:

UTI.



HISTORY OF PRESENT ILLNESS:

This 78-year-old woman with a past medical history of multiple medical problems

including liver transplantation about 13 years ago for HUTCHINSON and history of recurrent

UTIs.  Currently, the patient had dysuria.  Urine cultures done outpatient which showed

Pseudomonas and the patient came to [facility] for further evaluation and

treatment.  There is no history of any fever, rigors, or chills.  The patient was seen

at Urgent Care today.  The patient also had urgency and dysuria also.



PAST MEDICAL HISTORY:

Reviewed include liver transplantation.  Rest of the chart and rest of the history

reviewed.



HOME MEDICATIONS:

Reviewed include Januvia, doses and other medications also noted but not confirmed yet.



ALLERGIES:

Iodinated contrast dye.



FAMILY HISTORY:

No history of liver disease in the family.



SOCIAL HISTORY:

No smoking.  No alcohol.



REVIEW OF SYSTEMS:

Fourteen-point review is negative except as mentioned earlier.



PHYSICAL EXAMINATION:

VITAL SIGNS:  Pulse 80, blood pressure 159/70, respirations 16.

HEENT:  Conjunctivae normal.

CARDIOVASCULAR SYSTEM:  S1, S2.

RESPIRATION:  Breath sounds diminished at the bases.

CHEST:  Clear to auscultation.

ABDOMEN:  Soft, nontender.  No mass.

LEGS:  No edema.

NERVOUS SYSTEM: Nonfocal.



LABORATORY DATA:

WBC 10.  Creatinine 1.47.  Other labs are noted.



ASSESSMENT:

1. Acute urinary tract infection with Pseudomonas aeruginosa.

2. Elevated creatinine with acute renal failure, mild.

3. Hyponatremia.

4. History of liver transplant for nonalcoholic steatohepatitis.

5. Diabetes mellitus type 2.

6. Hypertension.



RECOMMENDATIONS AND DISCUSSION:

This 78-year-old woman admitted with UTI.  I would recommend initiate IV Zosyn at this

time.  Otherwise, monitor closely and resume the home medications once they are

confirmed.  Monitor blood pressure closely.  The patient is immunosuppressed.

Prognosis guarded.  Further recommendations to follow.  See orders for details and Dr. Castelan will follow on Monday.





MMODL / IJN: [id number] / Job#: [id number]

## 2024-07-28 NOTE — P.NPCON
History of Present Illness





- Reason for Consult


chronic renal failure





- History of Present Illness





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 

chronic kidney disease most likely stage III a,  following with nephrologist out

of [facility].


History of liver transplant 13 years ago for H[facility]CH[place]


Patient is admitted to the hospital with complaints of  abdominal pain.  She has

had dysuria.


History of repeated urinary tract infections.





currently maintained on IV antibiotics.  Urine culture is pending.  UA 

suggestive of UTI.


Status post fluid bolus in the ER.





Blood pressure has not been low.





Serum creatinine at 1.4 mg/dL this admission.  Previous creatinine 1.3 on 10/3

0/2023.





Patient is maintained on cyclosporine and CellCept for history of liver 

transplant 13 years ago.











Review of Systems





as per HPI





Past Medical History


Past Medical History: CVA/TIA, Diabetes Mellitus, Hypertension, Thyroid Disorder


History of Any Multi-Drug Resistant Organisms: None Reported


Past Surgical History: Appendectomy, Cholecystectomy, Hysterectomy


Additional Past Surgical History / Comment(s): D&C, liver transplant


Past Anesthesia/Blood Transfusion Reactions: No Reported Reaction


Past Psychological History: No Psychological Hx Reported


Smoking Status: Never smoker


Past Alcohol Use History: None Reported


Past Drug Use History: None Reported





- Past Family History


  ** Mother


Family Medical History: Liver Disease





Medications and Allergies


                                Home Medications











 Medication  Instructions  Recorded  Confirmed  Type


 


Acetaminophen Tab [Tylenol] 1,000 mg PO BID PRN 11/11/20 07/27/24 History


 


Clopidogrel [Plavix] 75 mg PO DAILY 11/11/20 07/27/24 History


 


Glucosamine/Chondr Coffey A Sod [Osteo 2 tab PO DAILY 11/11/20 07/27/24 History





Bi-Flex Caplet]    


 


Insulin Glargine [Lantus Vial] 35 unit SQ DAILY 11/11/20 07/27/24 History


 


Levothyroxine Sodium [Synthroid] 50 mcg PO DAILY 11/11/20 07/27/24 History


 


Metoprolol Tartrate [Lopressor] 50 mg PO BID 11/11/20 07/27/24 History


 


Mycophenolate Sodium [Mycophenolic 360 mg PO BID 11/11/20 07/27/24 History





Acid]    


 


Pantoprazole Sodium [Protonix] 40 mg PO AC-BRKFST 11/11/20 07/27/24 History


 


Vit C/E/Zn/Coppr/Lutein/Zeaxan 1 cap PO DAILY 11/11/20 07/27/24 History





[Preservision Areds 2 Softgel]    


 


lisinopriL [Prinivil] 10 mg PO DAILY 11/11/20 07/27/24 History


 


Insulin Regular, Human [NovoLIN R 5 units SQ AC-TID 11/17/21 07/27/24 History





Flexpen]    


 


Meclizine [Antivert] 25 mg PO BID 11/17/21 07/27/24 History


 


Atorvastatin [Lipitor] 40 mg PO HS 06/07/22 07/27/24 History


 


Furosemide [Lasix] 20 mg PO AS DIRECTED PRN 06/07/22 07/27/24 History


 


Zinc Gluconate [Zinc] 100 mg PO DAILY 06/07/22 07/27/24 History


 


Cyclosporine, Modified [Gengraf] 25 mg PO DAILY 06/08/22 07/27/24 History


 


cycloSPORINE, MODIFIED [Neoral] 50 mg PO HS 06/08/22 07/27/24 History


 


Fenofibrate [Lofibra] 160 mg PO DAILY 30 Days #30 tab 06/09/22 07/27/24 Rx


 


Amoxicillin 2,000 mg PO ONETIME PRN 07/27/24 07/27/24 History


 


Balance Of Nature Fruit & Veggie 6 tab PO DAILY 07/27/24 07/27/24 History


 


Balance Of Nature(Unknown) 2 tab PO DAILY 07/27/24 07/27/24 History


 


Calcium W/Vitamin D3  2 tab PO DAILY 07/27/24 07/27/24 History


 


Cephalexin [Keflex] 250 mg PO AS DIRECTED 07/27/24 07/27/24 History


 


Cranberry [id number] 30,000 mg PO DAILY 07/27/24 07/27/24 History


 


D-Mannose 1200mg 2 cap PO DAILY 07/27/24 07/27/24 History


 


Magnesium [id number] W/Potassium 1 cap PO DAILY 07/27/24 07/27/24 History





450mg    


 


Multivit-Minerals/FA/Lycopene 1 tab PO DAILY 07/27/24 07/27/24 History





[One-A-Day Men's 50 Plus Tablet]    


 


cefUROXime axetiL [Ceftin] 500 mg PO Q12H 07/27/24 07/27/24 History


 


sitaGLIPtin [Januvia] 50 mg PO AS DIRECTED 07/27/24 07/27/24 History








                                    Allergies











Allergy/AdvReac Type Severity Reaction Status Date / Time


 


Iodinated Contrast Media Allergy  Rash/Hives Verified 07/27/24 16:16


 


pioglitazone [From Actos] Allergy  Rash/Hives Verified 07/27/24 16:16


 


pentazocine [From Talwin] AdvReac  Vomiting Verified 07/27/24 16:16














Physical Exam


Vitals: 


                                   Vital Signs











  Temp Pulse Pulse Resp BP BP Pulse Ox


 


 07/28/24 07:00  97.9 F   68  17   140/73  97


 


 07/28/24 02:00  97.8 F   72  18   111/72  97


 


 07/27/24 21:51    74    147/67 


 


 07/27/24 20:00  98.3 F   74  18   134/66  97


 


 07/27/24 16:41  98.5 F   73  18   123/78  95


 


 07/27/24 16:08  98.5 F  83   18  132/60   97








                                Intake and Output











 07/27/24 07/28/24 07/28/24





 22:59 06:59 14:59


 


Intake Total 540 340 118


 


Balance 540 340 118


 


Intake:   


 


  Intake, IV Titration  100 





  Amount   


 


    Piperacillin-Tazobactam 3  100 





    .375 gm In Sodium   





    Chloride 0.9% 100 ml @ 25   





    mls/hr IVPB Q8HR [facility] Rx#   





    :[id number]   


 


  Oral 540 240 118


 


Other:   


 


  Voiding Method Toilet Toilet 


 


  # Voids 2 2 


 


  Weight 67.132 kg  














patient is awake, comfortable, no acute distress.


Examination of the heart S1 and S2


Examination of the lungs bilateral breath sounds are heard


Abdomen is soft nontender


Examination of the lower extremities shows no significant edema


CNS exam grossly intact





Results





- Lab Results


                             Most recent lab results











Calcium  9.3 mg/dL (8.4-10.2)   07/28/24  02:57    














                                 07/28/24 02:57





                                 07/28/24 02:57





Assessment and Plan


Assessment: 





1.  Chronic kidney disease NKF stage IIIa with baseline creatinine most likely 

around 1.3 mg/dL.  Patient follows with nephrology out of [facility].  

Renal function appears to be close to baseline. Etiology is likely underlying 

chronic use of calcineurin inhibitors.


2.  UTI maintained on antibiotics.  Urine culture is pending.  Patient has 

history of recurrent UTIs.


3.  History of liver transplant for HUTCHINSON 13 years ago maintained on cyclosporine

and CellCept


4.  Type 2 diabetes


5.  Hypothyroidism


Plan: 





add IV fluids


Repeat labs in a.m.


Check cyclosporine level in a.m. prior to a.m. dose.


Continue with antibiotics.





Thank you for the consultation.  We will continue to follow the patient with you

during her hospitalization.

## 2024-07-29 LAB
ANION GAP SERPL CALC-SCNC: 11 MMOL/L
BASOPHILS # BLD AUTO: 0 K/UL (ref 0–0.2)
BASOPHILS NFR BLD AUTO: 0 %
BUN SERPL-SCNC: 30 MG/DL (ref 7–17)
CALCIUM SPEC-MCNC: 8.8 MG/DL (ref 8.4–10.2)
CHLORIDE SERPL-SCNC: 100 MMOL/L (ref 98–107)
CO2 SERPL-SCNC: 22 MMOL/L (ref 22–30)
EOSINOPHIL # BLD AUTO: 0.1 K/UL (ref 0–0.7)
EOSINOPHIL NFR BLD AUTO: 2 %
ERYTHROCYTE [DISTWIDTH] IN BLOOD BY AUTOMATED COUNT: 3.83 M/UL (ref 3.8–5.4)
ERYTHROCYTE [DISTWIDTH] IN BLOOD: 15.1 % (ref 11.5–15.5)
GLUCOSE BLD-MCNC: 135 MG/DL (ref 70–110)
GLUCOSE BLD-MCNC: 170 MG/DL (ref 70–110)
GLUCOSE BLD-MCNC: 193 MG/DL (ref 70–110)
GLUCOSE BLD-MCNC: 227 MG/DL (ref 70–110)
GLUCOSE SERPL-MCNC: 121 MG/DL (ref 74–99)
HCT VFR BLD AUTO: 30.7 % (ref 34–46)
HGB BLD-MCNC: 10.4 GM/DL (ref 11.4–16)
LYMPHOCYTES # SPEC AUTO: 1.8 K/UL (ref 1–4.8)
LYMPHOCYTES NFR SPEC AUTO: 35 %
MCH RBC QN AUTO: 27.2 PG (ref 25–35)
MCHC RBC AUTO-ENTMCNC: 33.9 G/DL (ref 31–37)
MCV RBC AUTO: 80.1 FL (ref 80–100)
MONOCYTES # BLD AUTO: 0.5 K/UL (ref 0–1)
MONOCYTES NFR BLD AUTO: 9 %
NEUTROPHILS # BLD AUTO: 2.6 K/UL (ref 1.3–7.7)
NEUTROPHILS NFR BLD AUTO: 51 %
PLATELET # BLD AUTO: 293 K/UL (ref 150–450)
POTASSIUM SERPL-SCNC: 4.4 MMOL/L (ref 3.5–5.1)
SODIUM SERPL-SCNC: 133 MMOL/L (ref 137–145)
WBC # BLD AUTO: 5.2 K/UL (ref 3.8–10.6)

## 2024-07-29 RX ADMIN — INSULIN DETEMIR SCH UNIT: 100 INJECTION, SOLUTION SUBCUTANEOUS at 06:20

## 2024-07-29 NOTE — P.PN
Subjective


Progress Note Date: 07/29/24





HISTORY OF PRESENT ILLNESS:


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 

kidney disease, history of liver transplant 13 years ago from [place], history of 

hypertension, hyperlipidemia, type 2 diabetes, hypothyroidism, previous history 

of CVA and TIA who apparently has been seen nephrologist at [facility] on more regular basis she was admitted to the hospital on July 27, 2024

with complaint of dysuria, urine culture was done as an outpatient showed 

Pseudomonas patient came back to the hospital after seen in the urgent care unit

she had more urgency dysuria polyuria and recurrent frequency.  Laboratory value

on admission shows white blood cell of 10,000 urine was definitely positive 

culture in progress not complete she was started on Zosyn initially no 

infectious disease consultation was done with nephrology consultation for stage 

III chronic kidney disease.  Continue current management will consult infectious

disease and continue to follow her culture.











REVIEW OF SYSTEMS:


CONSTITUTIONAL: Well-developed no acute respiratory distress.


EYES: No icterus sclerae, no conjunctivitis.


EARS, NOSE, MOUTH, THROAT, and FACE: No sore throat, lymphadenopathy, carotid 

bruits or deformity.


RESPIRATORY: No SOB cough or wheezes.


CARDIOVASCULAR: No CP, Palpitation, PND, Orthopnea, or angina.


GASTROINTESTINAL: No Abd pain, Nausea or vomiting, no Diarrhea or constipation, 

No GI Bleed, no distention or masses.


GENITOURINARY: Frequency urgency and hesitancy.


INTEGUMENT/BREAST: Negative for any muscular injury with mild osteoarthritis..


HEMATOLOGIC/LYMPHATIC: Negative for bleed or purpura.


MUSCULOSKELTAL: Negative for Myalgia or arthralgia.


NEURLOGICAL: No LOC, Sz or syncope, blurred vision dizziness or abnormality..


BEHAVIORAL/PSYCH: Negative.


ENDOCRINE: Negative.








PHYSICAL EXAMINATION:


General Appearance: Alert, cooperative, no distress, appears stated age.


Neck HEENT: Supple, no lymphadenopathy, no thyroid enlargement, no carotid 

bruits.


Lungs: Clear to auscultation without crackles or wheezes no rhonchi, no 

deformity.


Chest Wall: Chest wall normal expansion with deep inspiration no tenderness and 

no deformity was found on exam, no costochondral pain or discomfort.


Heart: Regular rate and rhythm, S1, S2 normal, no murmur, rub or gallop.


Back: Right lower back pain.


Abdomen: Soft, non-tender, bowel sounds active all four quadrants, slight di

scomfort in lower abdominal region area and in the flank area no rebound or 

rigidity.


Extremities: Extremities normal, atraumatic, no cyanosis or edema.


Pulses: 2+ and symmetric.


Skin: Skin color, texture, tugor normal, no rashes or lesions.


Neurologic: Alert oriented x3 cranial nerves II through XII intact, no motor 

deficit, no abnormal balance or gait.





ASSESSMENT AND PLAN:





_Acute urinary tract infection with failure to outpatient treatment and 

management mostly Pseudomonas aeruginosa, initiated on Zosyn will continue 

current treatment till the final culture is back.





_Acute kidney injury with chronic kidney disease stage III, continue hydration, 

continue to see nephrology on regular basis continue aggressive treatment and 

management.





_History of liver transplant: Resume antirejection drugs watch for any toxicity 

especially with current antibiotics.





_Mild hyponatremia: Most likely reactive to UTI continue gentle hydration watch 

symptoms closely.





_Type 2 diabetes: Resume Januvia 50 mg a day, still on Novolin R 5 units AC 

meals and Lantus 35 units daily.





_Hypertension: Continue lisinopril 10 mg a day along with metoprolol titrate 50 

mg twice a day.





_Hyperlipidemia: Remain on atorvastatin 40 mg daily along with fenofibrate 160 

mg a day.





_Hypothyroidism: Remain on levothyroxine 50 mcg a day continue medication.





_Mild tachycardia and arrhythmia: Remain on metoprolol titrate.





_Severe GERD: Remain on pantoprazole 40 mg AC breakfast.





CODE STATUS: DO NOT RESUSCITATE.





Objective





- Vital Signs


Vital signs: 


                                   Vital Signs











Temp  98.2 F   07/29/24 02:34


 


Pulse  64   07/29/24 02:34


 


Resp  15   07/29/24 02:34


 


BP  144/83   07/29/24 02:34


 


Pulse Ox  98   07/29/24 02:34


 


FiO2      








                                 Intake & Output











 07/28/24 07/28/24 07/29/24





 06:59 18:59 06:59


 


Intake Total 880 118 


 


Balance 880 118 


 


Intake:   


 


  Intake, IV Titration 100  





  Amount   


 


    Piperacillin-Tazobactam 3 100  





    .375 gm In Sodium   





    Chloride 0.9% 100 ml @ 25   





    mls/hr IVPB Q8HR [facility] Rx#   





    :[id number]   


 


  Oral 780 118 


 


Other:   


 


  Voiding Method Toilet  Toilet


 


  # Voids 2 2 2














- Labs


CBC & Chem 7: 


                                 07/29/24 02:42





                                 07/29/24 02:42


Labs: 


                  Abnormal Lab Results - Last 24 Hours (Table)











  07/28/24 07/28/24 07/28/24 Range/Units





  12:24 17:28 20:46 


 


Hgb     (11.4-16.0)  gm/dL


 


Hct     (34.0-46.0)  %


 


Sodium     (137-145)  mmol/L


 


BUN     (7-17)  mg/dL


 


Creatinine     (0.52-1.04)  mg/dL


 


Glucose     (74-99)  mg/dL


 


POC Glucose (mg/dL)  128 H  126 H  177 H  ()  mg/dL














  07/29/24 07/29/24 07/29/24 Range/Units





  02:42 02:42 06:07 


 


Hgb  10.4 L    (11.4-16.0)  gm/dL


 


Hct  30.7 L    (34.0-46.0)  %


 


Sodium   133 L   (137-145)  mmol/L


 


BUN   30 H   (7-17)  mg/dL


 


Creatinine   1.47 H   (0.52-1.04)  mg/dL


 


Glucose   121 H   (74-99)  mg/dL


 


POC Glucose (mg/dL)    135 H  ()  mg/dL








                      Microbiology - Last 24 Hours (Table)











 07/27/24 12:37 Urine Culture - Final





 Urine,Clean Catch

## 2024-07-29 NOTE — P.PN
Subjective





patient is seen for follow-up for chronic kidney disease.


No significant complaints today.


Patient is maintained on IV fluids.


Serum creatinine staying at about 1.4 mg/dL.


Good urine output with no significant urinary symptoms.





Objective





- Vital Signs


Vital signs: 


                                   Vital Signs











Temp  98.7 F   07/29/24 14:00


 


Pulse  70   07/29/24 14:00


 


Resp  18   07/29/24 14:00


 


BP  129/68   07/29/24 14:00


 


Pulse Ox  97   07/29/24 14:00


 


FiO2      








                                 Intake & Output











 07/28/24 07/29/24 07/29/24





 18:59 06:59 18:59


 


Intake Total 118  118


 


Balance 118  118


 


Intake:   


 


  Oral 118  118


 


Other:   


 


  Voiding Method  Toilet Toilet


 


  # Voids 2 2 














- Exam





patient is awake, comfortable, distress.


Alert oriented 3


She appears euvolemic with no evidence of edema lower extremities.


CNS exam is grossly intact.





- Labs


CBC & Chem 7: 


                                 07/29/24 02:42





                                 07/29/24 02:42


Labs: 


                  Abnormal Lab Results - Last 24 Hours (Table)











  07/28/24 07/28/24 07/29/24 Range/Units





  17:28 20:46 02:42 


 


Hgb    10.4 L  (11.4-16.0)  gm/dL


 


Hct    30.7 L  (34.0-46.0)  %


 


Sodium     (137-145)  mmol/L


 


BUN     (7-17)  mg/dL


 


Creatinine     (0.52-1.04)  mg/dL


 


Glucose     (74-99)  mg/dL


 


POC Glucose (mg/dL)  126 H  177 H   ()  mg/dL














  07/29/24 07/29/24 07/29/24 Range/Units





  02:42 06:07 11:59 


 


Hgb     (11.4-16.0)  gm/dL


 


Hct     (34.0-46.0)  %


 


Sodium  133 L    (137-145)  mmol/L


 


BUN  30 H    (7-17)  mg/dL


 


Creatinine  1.47 H    (0.52-1.04)  mg/dL


 


Glucose  121 H    (74-99)  mg/dL


 


POC Glucose (mg/dL)   135 H  193 H  ()  mg/dL








                      Microbiology - Last 24 Hours (Table)











 07/27/24 12:37 Urine Culture - Final





 Urine,Clean Catch 














Assessment and Plan


Assessment: 





1.  Chronic kidney disease NKF stage IIIa with baseline creatinine most likely 

around 1.3 mg/dL.  Patient follows with nephrology out of .  

Renal function appears to be close to baseline. Etiology is likely underlying ch

ronic use of calcineurin inhibitors.


2.  UTI maintained on antibiotics.  Urine culture is pending.  Patient has 

history of recurrent UTIs.


3.  History of liver transplant for HUTCHINSON 13 years ago maintained on cyclosporine

and CellCept


4.  Type 2 diabetes


5.  Hypothyroidism


Plan: 





continue normal saline.


Continue with ACE inhibitor's and current immunosuppressive medications.  


Follow-up on cyclosporine levels.


Patient is stable for discharge from nephrology standpoint.  She will follow-up 

with her primary nephrologist in 1 week.

## 2024-07-29 NOTE — PN
PROGRESS NOTE



DATE OF SERVICE:  07/28/2024



SUBJECTIVE:

This 78-year-old woman was admitted with UTI with Pseudomonas and immunosuppressed.

She is on IV antibiotics.  No chest pain, no palpitations.



OBJECTIVE:

VITAL SIGNS:  Pulse 72, blood pressure 111/70, respirations 18.

CHEST:  Clear to auscultation.

CARDIOVASCULAR:  S1, S2.

ABDOMEN:  Soft.

NERVOUS SYSTEM:  Nonfocal.



LABORATORY DATA:

Creatinine 1.43, rest of the labs are noted.



ASSESSMENT:

1. Acute UTI with Pseudomonas aeruginosa.

2. Elevated creatinine with mild acute renal failure present on admission.

3. Hyponatremia.

4. History of liver transplant for nonalcoholic steatohepatitis.

5. Diabetes mellitus type 2.

6. Immunosuppressed.

7. Hypertension.



RECOMMENDATIONS AND DISCUSSION:

Recommended to continue current management.  Continue with symptomatic treatment.

Continue with antibiotics.  Otherwise, closely monitor and Dr. Castelan will follow.





MMODL / IJN: [id number] / Job#: [id number]

## 2024-07-29 NOTE — P.CONS
History of Present Illness





- Reason for Consult


Consult date: 07/29/24


Pseudomonas urinary tract infection


Requesting physician: Harrison Castelan





- Chief Complaint


Urinary burning and frequency x few days





- History of Present Illness





Patient is a 78-year-old  female with a past medical history difficult 

for diabetes mellitus hypertension CVA TIA hypothyroidism history of liver 

transplant 13 years ago for H[facility]CH[place]SON on immunosuppressive medication patient also 

have a history of recurrent UTI patient started having symptoms of urinary 

burning and frequency for the patient was evaluated at the local urgent care 

patient did have urine cultures obtained and the patient was started on 

cefuroxime patient was subsequently contacted as the cultures came back positive

with a Pseudomonas that was resistant to cefuroxime and the patient was advised 

to go to the hospital patient mention she did have worsening of her symptoms 

while on cefuroxime however denies high-grade fever no suprapubic or flank pain 

patient denies having any nausea no vomiting and no diarrhea on presentation the

hospital the patient was afebrile and no fever have been recorded subsequently 

patient was not tachycardic hypotensive or hypoxic she did have a white count of

5.2 creatinine was 1.47 UA has been repeated which is positive patient was 

started on Zosyn infectious disease was consulted for further management of 

antibiotic therapy





Review of Systems





Positive point and negatives has been  mentioned in the HPI, complete review of 

systems was performed and all other systems are negative





Past Medical History


Past Medical History: CVA/TIA, Diabetes Mellitus, Hypertension, Thyroid Disorder


History of Any Multi-Drug Resistant Organisms: None Reported


Past Surgical History: Appendectomy, Cholecystectomy, Hysterectomy


Additional Past Surgical History / Comment(s): D&C, liver transplant


Past Anesthesia/Blood Transfusion Reactions: No Reported Reaction


Past Psychological History: No Psychological Hx Reported


Smoking Status: Never smoker


Past Alcohol Use History: None Reported


Past Drug Use History: None Reported





- Past Family History


  ** Mother


Family Medical History: Liver Disease





Medications and Allergies


                                Home Medications











 Medication  Instructions  Recorded  Confirmed  Type


 


Acetaminophen Tab [Tylenol] 1,000 mg PO BID PRN 11/11/20 07/27/24 History


 


Clopidogrel [Plavix] 75 mg PO DAILY 11/11/20 07/27/24 History


 


Glucosamine/Chondr Coffey A Sod [Osteo 2 tab PO DAILY 11/11/20 07/27/24 History





Bi-Flex Caplet]    


 


Insulin Glargine [Lantus Vial] 35 unit SQ DAILY 11/11/20 07/27/24 History


 


Levothyroxine Sodium [Synthroid] 50 mcg PO DAILY 11/11/20 07/27/24 History


 


Metoprolol Tartrate [Lopressor] 50 mg PO BID 11/11/20 07/27/24 History


 


Mycophenolate Sodium [Mycophenolic 360 mg PO BID 11/11/20 07/27/24 History





Acid]    


 


Pantoprazole Sodium [Protonix] 40 mg PO AC-BRKFST 11/11/20 07/27/24 History


 


Vit C/E/Zn/Coppr/Lutein/Zeaxan 1 cap PO DAILY 11/11/20 07/27/24 History





[Preservision Areds 2 Softgel]    


 


lisinopriL [Prinivil] 10 mg PO DAILY 11/11/20 07/27/24 History


 


Insulin Regular, Human [NovoLIN R 5 units SQ AC-TID 11/17/21 07/27/24 History





Flexpen]    


 


Meclizine [Antivert] 25 mg PO BID 11/17/21 07/27/24 History


 


Atorvastatin [Lipitor] 40 mg PO HS 06/07/22 07/27/24 History


 


Furosemide [Lasix] 20 mg PO AS DIRECTED PRN 06/07/22 07/27/24 History


 


Zinc Gluconate [Zinc] 100 mg PO DAILY 06/07/22 07/27/24 History


 


Cyclosporine, Modified [Gengraf] 25 mg PO DAILY 06/08/22 07/27/24 History


 


cycloSPORINE, MODIFIED [Neoral] 50 mg PO HS 06/08/22 07/27/24 History


 


Fenofibrate [Lofibra] 160 mg PO DAILY 30 Days #30 tab 06/09/22 07/27/24 Rx


 


Amoxicillin 2,000 mg PO ONETIME PRN 07/27/24 07/27/24 History


 


Balance Of Nature Fruit & Veggie 6 tab PO DAILY 07/27/24 07/27/24 History


 


Balance Of Nature(Unknown) 2 tab PO DAILY 07/27/24 07/27/24 History


 


Calcium W/Vitamin D3  2 tab PO DAILY 07/27/24 07/27/24 History


 


Cephalexin [Keflex] 250 mg PO AS DIRECTED 07/27/24 07/27/24 History


 


Cranberry [id number] 30,000 mg PO DAILY 07/27/24 07/27/24 History


 


D-Mannose 1200mg 2 cap PO DAILY 07/27/24 07/27/24 History


 


Magnesium [id number] W/Potassium 1 cap PO DAILY 07/27/24 07/27/24 History





450mg    


 


Multivit-Minerals/FA/Lycopene 1 tab PO DAILY 07/27/24 07/27/24 History





[One-A-Day Men's 50 Plus Tablet]    


 


cefUROXime axetiL [Ceftin] 500 mg PO Q12H 07/27/24 07/27/24 History


 


sitaGLIPtin [Januvia] 50 mg PO DAILY 07/27/24 07/29/24 History








                                    Allergies











Allergy/AdvReac Type Severity Reaction Status Date / Time


 


Iodinated Contrast Media Allergy  Rash/Hives Verified 07/27/24 16:16


 


pioglitazone [From Actos] Allergy  Rash/Hives Verified 07/27/24 16:16


 


pentazocine [From Talwin] AdvReac  Vomiting Verified 07/27/24 16:16














Physical Exam


Vitals: 


                                   Vital Signs











  Temp Pulse Resp BP Pulse Ox


 


 07/29/24 07:04  97.6 F  55 L  17  149/65  96


 


 07/29/24 02:34  98.2 F  64  15  144/83  98


 


 07/28/24 19:39  98.2 F  76  15  142/84  96


 


 07/28/24 14:46  97.7 F  70  17  155/80  96








                                Intake and Output











 07/28/24 07/29/24 07/29/24





 22:59 06:59 14:59


 


Other:   


 


  Voiding Method Toilet Toilet Toilet


 


  # Voids 2 2 














GENERAL DESCRIPTION: Elderly female lying in bed, no distress. No tachypnea or 

accessory muscle of respiration use.


HEENT: Shows Pallor , no scleral icterus. Oral mucous membrane is dry. No 

pharyngeal erythema or thrush


NECK: Trachea central, no thyromegaly.


LUNGS: Unlabored breathing. Clear to auscultation anteriorly. No wheeze or 

crackle.


HEART: S1, S2, regular rate and rhythm. No loud murmur


ABDOMEN: Soft, no tenderness , guarding or rigidity, no organomegaly


EXTREMITIES: No edema of feet.


SKIN: No rash, no masses palpable.


NEUROLOGICAL: The patient is awake, alert, oriented x3, mood and affect normal.














Results


CBC & Chem 7: 


                                 07/29/24 02:42





                                 07/29/24 02:42


Labs: 


                  Abnormal Lab Results - Last 24 Hours (Table)











  07/28/24 07/28/24 07/28/24 Range/Units





  12:24 17:28 20:46 


 


Hgb     (11.4-16.0)  gm/dL


 


Hct     (34.0-46.0)  %


 


Sodium     (137-145)  mmol/L


 


BUN     (7-17)  mg/dL


 


Creatinine     (0.52-1.04)  mg/dL


 


Glucose     (74-99)  mg/dL


 


POC Glucose (mg/dL)  128 H  126 H  177 H  ()  mg/dL














  07/29/24 07/29/24 07/29/24 Range/Units





  02:42 02:42 06:07 


 


Hgb  10.4 L    (11.4-16.0)  gm/dL


 


Hct  30.7 L    (34.0-46.0)  %


 


Sodium   133 L   (137-145)  mmol/L


 


BUN   30 H   (7-17)  mg/dL


 


Creatinine   1.47 H   (0.52-1.04)  mg/dL


 


Glucose   121 H   (74-99)  mg/dL


 


POC Glucose (mg/dL)    135 H  ()  mg/dL








                      Microbiology - Last 24 Hours (Table)











 07/27/24 12:37 Urine Culture - Final





 Urine,Clean Catch 














Assessment and Plan


(1) Pseudomonas aeruginosa infection


Current Visit: Yes   Status: Acute   Code(s): A49.8 - OTHER BACTERIAL INFECTIONS

OF UNSPECIFIED SITE   SNOMED Code(s): 08440016


   





(2) UTI (urinary tract infection)


Current Visit: Yes   Status: Acute   Code(s): N39.0 - URINARY TRACT INFECTION, 

SITE NOT SPECIFIED   SNOMED Code(s): 30781713


   


Plan: 





1patient presented to hospital with worsening urinary symptoms of burning and 

frequency however the patient has significant flank pain and no fever more 

likely representing cystitis clinically behaving as deep infection such as 

pyelonephritis patient is immunocompromise because of immunosuppressive 

medication for the liver transplant


2-patient to continue with the Zosyn while inpatient however patient be able to 

finish therapy with a short course of oral Cipro on discharge to finish 

treatment for underlying Pseudomonas UTI


We will follow on clinical condition and cultures to further adjust medication 

if needed


Thank you for this consultation we will follow the patient along with you


Dictation was produced using dragon dictation software. please excuse any gra

mmatical, word or spelling errors. 








Time with Patient: Greater than 30

## 2024-07-30 VITALS
HEART RATE: 64 BPM | TEMPERATURE: 98.1 F | RESPIRATION RATE: 17 BRPM | DIASTOLIC BLOOD PRESSURE: 74 MMHG | SYSTOLIC BLOOD PRESSURE: 129 MMHG

## 2024-07-30 LAB
ALBUMIN SERPL-MCNC: 4.3 G/DL (ref 3.5–5)
ALBUMIN/GLOB SERPL: 1.6 {RATIO}
ALP SERPL-CCNC: 31 U/L (ref 38–126)
ALT SERPL-CCNC: 20 U/L (ref 4–34)
ANION GAP SERPL CALC-SCNC: 11 MMOL/L
AST SERPL-CCNC: 37 U/L (ref 14–36)
BUN SERPL-SCNC: 25 MG/DL (ref 7–17)
CALCIUM SPEC-MCNC: 9 MG/DL (ref 8.4–10.2)
CHLORIDE SERPL-SCNC: 105 MMOL/L (ref 98–107)
CO2 SERPL-SCNC: 20 MMOL/L (ref 22–30)
ERYTHROCYTE [DISTWIDTH] IN BLOOD BY AUTOMATED COUNT: 3.99 M/UL (ref 3.8–5.4)
ERYTHROCYTE [DISTWIDTH] IN BLOOD: 14.6 % (ref 11.5–15.5)
GLOBULIN SER CALC-MCNC: 2.7 G/DL
GLUCOSE BLD-MCNC: 101 MG/DL (ref 70–110)
GLUCOSE BLD-MCNC: 115 MG/DL (ref 70–110)
GLUCOSE SERPL-MCNC: 96 MG/DL (ref 74–99)
HCT VFR BLD AUTO: 31.6 % (ref 34–46)
HGB BLD-MCNC: 10.4 GM/DL (ref 11.4–16)
MCH RBC QN AUTO: 26 PG (ref 25–35)
MCHC RBC AUTO-ENTMCNC: 32.8 G/DL (ref 31–37)
MCV RBC AUTO: 79.2 FL (ref 80–100)
PLATELET # BLD AUTO: 326 K/UL (ref 150–450)
POTASSIUM SERPL-SCNC: 4.6 MMOL/L (ref 3.5–5.1)
PROT SERPL-MCNC: 7 G/DL (ref 6.3–8.2)
SODIUM SERPL-SCNC: 136 MMOL/L (ref 137–145)
WBC # BLD AUTO: 5.8 K/UL (ref 3.8–10.6)

## 2024-08-30 ENCOUNTER — HOSPITAL ENCOUNTER (OUTPATIENT)
Dept: HOSPITAL 47 - ORWHC2ENDO | Age: 79
Discharge: HOME | End: 2024-08-30
Attending: INTERNAL MEDICINE
Payer: MEDICARE

## 2024-08-30 VITALS — SYSTOLIC BLOOD PRESSURE: 147 MMHG | DIASTOLIC BLOOD PRESSURE: 82 MMHG | HEART RATE: 59 BPM

## 2024-08-30 VITALS — BODY MASS INDEX: 26.8 KG/M2

## 2024-08-30 VITALS — TEMPERATURE: 97.7 F | RESPIRATION RATE: 16 BRPM

## 2024-08-30 DIAGNOSIS — Z79.4: ICD-10-CM

## 2024-08-30 DIAGNOSIS — Z86.010: ICD-10-CM

## 2024-08-30 DIAGNOSIS — I10: ICD-10-CM

## 2024-08-30 DIAGNOSIS — Z12.11: Primary | ICD-10-CM

## 2024-08-30 DIAGNOSIS — E07.9: ICD-10-CM

## 2024-08-30 DIAGNOSIS — Z79.899: ICD-10-CM

## 2024-08-30 DIAGNOSIS — E11.9: ICD-10-CM

## 2024-08-30 DIAGNOSIS — E78.5: ICD-10-CM

## 2024-08-30 DIAGNOSIS — K52.9: ICD-10-CM

## 2024-08-30 DIAGNOSIS — I67.9: ICD-10-CM

## 2024-08-30 DIAGNOSIS — K57.30: ICD-10-CM

## 2024-08-30 DIAGNOSIS — Z88.9: ICD-10-CM

## 2024-08-30 DIAGNOSIS — Z88.5: ICD-10-CM

## 2024-08-30 DIAGNOSIS — Z79.84: ICD-10-CM

## 2024-08-30 LAB — GLUCOSE BLD-MCNC: 113 MG/DL (ref 70–110)

## 2024-08-30 PROCEDURE — 45378 DIAGNOSTIC COLONOSCOPY: CPT

## 2024-08-30 RX ADMIN — POTASSIUM CHLORIDE SCH MLS/HR: 14.9 INJECTION, SOLUTION INTRAVENOUS at 08:33

## 2024-08-30 RX ADMIN — POTASSIUM CHLORIDE ONE MLS: 14.9 INJECTION, SOLUTION INTRAVENOUS at 08:21

## 2024-08-30 NOTE — P.PCN
Date of Procedure: 08/30/24


Procedure(s) Performed: 


BRIEF HISTORY: Patient is a 78-year-old pleasant white female scheduled for an 

elective colonoscopy as a part of evaluation for history of colon polyps.  Her 

last colonoscopy was 5 years ago and according to her she had colon polyps





PROCEDURE PERFORMED: Colonoscopy. 





PREOPERATIVE DIAGNOSIS: History of colon polyps. 





IV sedation per Anesthesia. 





PROCEDURE: After informed consent was obtained, the patient, was brought into 

the endoscopy unit. IV sedation was administered by Anesthesia under continuous 

monitoring.  Digital rectal examination was normal. Initially the Olympus CF-160

flexible video colonoscope was then inserted in the rectum, gradually advanced 

into the sigmoid colon and further advancement was not possible because of acute

angulation in this area.  The scope was removed.  Colonoscopy centimeters rectum

and gradually advanced into the cecum without any difficulty. Careful 

examination was performed as the scope was gradually being withdrawn. Ileocecal 

valve and the appendiceal orifice were visualized and appeared normal.  Prep was

excellent. Mucosa of the cecum, ascending colon, transverse colon, descending 

colon, sigmoid colon, and rectum appeared normal.  Katter sigmoid 

diverticulosis.  Retroflexion was performed in the rectum and no lesions were 

seen. The patient tolerated the procedure well. 





IMPRESSION: 


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


Scattered sigmoid diverticulosis.





RECOMMENDATIONS:  Findings of this examination were discussed with the patient 

as well as her family..  Is advised to be on high-fiber diet and take fiber 

supplements on a regular basis.

## 2024-12-12 ENCOUNTER — HOSPITAL ENCOUNTER (OUTPATIENT)
Dept: HOSPITAL 47 - RADMAMWWP | Age: 79
Discharge: HOME | End: 2024-12-12
Attending: INTERNAL MEDICINE
Payer: MEDICARE

## 2024-12-12 DIAGNOSIS — Z90.722: ICD-10-CM

## 2024-12-12 DIAGNOSIS — R92.323: ICD-10-CM

## 2024-12-12 DIAGNOSIS — Z80.3: ICD-10-CM

## 2024-12-12 DIAGNOSIS — Z78.0: ICD-10-CM

## 2024-12-12 DIAGNOSIS — C56.9: ICD-10-CM

## 2024-12-12 DIAGNOSIS — Z12.31: Primary | ICD-10-CM

## 2024-12-12 PROCEDURE — 77067 SCR MAMMO BI INCL CAD: CPT

## 2024-12-12 PROCEDURE — 77063 BREAST TOMOSYNTHESIS BI: CPT

## 2024-12-15 NOTE — MM
Reason for Exam: Screening  (asymptomatic). 

Last mammogram was performed 1 year(s) and 1 month(s) ago. 





Patient History: 

Menarche at age 13. First Full-Term Pregnancy at age 22. Left ovary removed at age 46. Right ovary

removed at age 46. Hysterectomy at age 46. Postmenopausal. Patient has history of breast feeding.

Other cancer. Estrogen, from age 46 until age 48.

Paternal grandmother had breast cancer. Paternal aunt had breast cancer. Maternal grandmother had

ovarian cancer, age 34. Mother had ovarian cancer at or over age 50. 





Risk Values: 

Love 5 year model risk: 1.5%.

NCI Lifetime model risk: 2.8%.





Prior Study Comparison: 

10/11/2021 Left Diagnostic Mammogram, [facility]. 10/11/2022 Bilateral MG 3D screening mammo w/cad, [facility].

11/6/2023 Bilateral MG 3D screening mammo w/cad, [facility]. 





Tissue Density: 

There are scattered areas of fibroglandular density.





Findings: 

Analyzed By CAD. 

The pattern is symmetrical.

No significant interval change

No suspicious groups of microcalcifications, spiculated or lobular masses, architectural distortion

or other secondary signs of malignancy are mammographically apparent. 





Overall Assessment: Benign, BI-RAD 2





Management: 

Screening Mammogram of both breasts in 1 year.

A negative mammogram report should not preclude additional follow up of suspicious palpable

abnormalities.

Patient should continue monthly self breast exam.

A clinical breast exam by your physician is recommended on an annual basis and results should be

correlated with mammographic findings.



Note on Love scores and lifetime risk:

1. A Love score greater than 3% is considered moderate risk. If this is the case, consider

specialist referral to assess eligibility for a risk reducing agent.

2. If overall lifetime risk for the development of breast cancer is 20% or higher, the patient may

qualify for future screening with alternating mammogram and breast MRI.



[facility], Workstation: [id number], 12/15/2024 4:00 AM.



Electronically signed and approved by: Alex Busch D.O. Radiologis

## 2025-01-27 ENCOUNTER — HOSPITAL ENCOUNTER (OUTPATIENT)
Dept: HOSPITAL 47 - LABPAT | Age: 80
Discharge: HOME | End: 2025-01-27
Attending: INTERNAL MEDICINE
Payer: MEDICARE

## 2025-01-27 DIAGNOSIS — Z01.812: Primary | ICD-10-CM

## 2025-01-27 DIAGNOSIS — R06.02: ICD-10-CM

## 2025-01-27 DIAGNOSIS — R94.39: ICD-10-CM

## 2025-01-27 LAB
ANION GAP SERPL CALC-SCNC: 13.3 MMOL/L (ref 4–12)
BUN SERPL-SCNC: 41.8 MG/DL (ref 9–27)
CHLORIDE SERPL-SCNC: 100 MMOL/L (ref 96–109)
CO2 SERPL-SCNC: 24.7 MMOL/L (ref 21.6–31.8)
ERYTHROCYTE [DISTWIDTH] IN BLOOD BY AUTOMATED COUNT: 4.31 X 10*6/UL (ref 4.1–5.2)
ERYTHROCYTE [DISTWIDTH] IN BLOOD: 14.4 % (ref 11.5–14.5)
HCT VFR BLD AUTO: 33.9 % (ref 37.2–46.3)
HGB BLD-MCNC: 10.7 G/DL (ref 12–15)
MCH RBC QN AUTO: 24.8 PG (ref 27–32)
MCHC RBC AUTO-ENTMCNC: 31.6 G/DL (ref 32–37)
MCV RBC AUTO: 78.7 FL (ref 80–97)
NRBC BLD AUTO-RTO: 0 X 10*3/UL (ref 0–0.01)
PLATELET # BLD AUTO: 314 X 10*3/UL (ref 140–440)
POTASSIUM SERPL-SCNC: 4.6 MMOL/L (ref 3.5–5.5)
SODIUM SERPL-SCNC: 138 MMOL/L (ref 135–145)
WBC # BLD AUTO: 5.5 X 10*3/UL (ref 4.5–10)

## 2025-01-27 PROCEDURE — 80051 ELECTROLYTE PANEL: CPT

## 2025-01-27 PROCEDURE — 84520 ASSAY OF UREA NITROGEN: CPT

## 2025-01-27 PROCEDURE — 82565 ASSAY OF CREATININE: CPT

## 2025-01-27 PROCEDURE — 85027 COMPLETE CBC AUTOMATED: CPT

## 2025-05-02 VITALS — BODY MASS INDEX: 27.3 KG/M2

## 2025-05-06 ENCOUNTER — HOSPITAL ENCOUNTER (OUTPATIENT)
Dept: HOSPITAL 47 - CATHEP | Age: 80
Discharge: HOME | End: 2025-05-06
Attending: INTERNAL MEDICINE
Payer: MEDICARE

## 2025-05-06 VITALS — DIASTOLIC BLOOD PRESSURE: 76 MMHG | SYSTOLIC BLOOD PRESSURE: 162 MMHG | HEART RATE: 68 BPM

## 2025-05-06 VITALS — RESPIRATION RATE: 16 BRPM | TEMPERATURE: 99 F

## 2025-05-06 DIAGNOSIS — I34.0: ICD-10-CM

## 2025-05-06 DIAGNOSIS — Z79.02: ICD-10-CM

## 2025-05-06 DIAGNOSIS — Z86.73: ICD-10-CM

## 2025-05-06 DIAGNOSIS — I10: ICD-10-CM

## 2025-05-06 DIAGNOSIS — I48.91: Primary | ICD-10-CM

## 2025-05-06 DIAGNOSIS — Z94.4: ICD-10-CM

## 2025-05-06 DIAGNOSIS — Z79.4: ICD-10-CM

## 2025-05-06 DIAGNOSIS — I25.10: ICD-10-CM

## 2025-05-06 DIAGNOSIS — I47.19: ICD-10-CM

## 2025-05-06 DIAGNOSIS — E11.9: ICD-10-CM

## 2025-05-06 DIAGNOSIS — I35.0: ICD-10-CM

## 2025-05-06 DIAGNOSIS — R55: ICD-10-CM

## 2025-05-06 DIAGNOSIS — Z79.890: ICD-10-CM

## 2025-05-06 DIAGNOSIS — E78.5: ICD-10-CM

## 2025-05-06 LAB
GLUCOSE BLD-MCNC: 69 MG/DL (ref 70–110)
GLUCOSE BLD-MCNC: 80 MG/DL (ref 70–110)

## 2025-05-06 PROCEDURE — 33285 INSJ SUBQ CAR RHYTHM MNTR: CPT

## 2025-05-06 RX ADMIN — POTASSIUM CHLORIDE ONE MLS: 14.9 INJECTION, SOLUTION INTRAVENOUS at 12:57

## 2025-05-06 RX ADMIN — CEFAZOLIN SCH MLS/HR: 330 INJECTION, POWDER, FOR SOLUTION INTRAMUSCULAR; INTRAVENOUS at 13:26

## 2025-05-06 RX ADMIN — LIDOCAINE HYDROCHLORIDE ONE ML: 10 INJECTION, SOLUTION EPIDURAL; INFILTRATION; INTRACAUDAL; PERINEURAL at 14:30

## 2025-05-06 RX ADMIN — CEFAZOLIN SODIUM ONE MLS: 10 INJECTION, POWDER, FOR SOLUTION INTRAVENOUS at 14:26
